# Patient Record
Sex: FEMALE | Race: WHITE | HISPANIC OR LATINO | Employment: FULL TIME | URBAN - METROPOLITAN AREA
[De-identification: names, ages, dates, MRNs, and addresses within clinical notes are randomized per-mention and may not be internally consistent; named-entity substitution may affect disease eponyms.]

---

## 2017-07-05 ENCOUNTER — OFFICE VISIT (OUTPATIENT)
Dept: OBSTETRICS AND GYNECOLOGY | Facility: CLINIC | Age: 37
End: 2017-07-05

## 2017-07-05 VITALS
HEIGHT: 66 IN | BODY MASS INDEX: 22.1 KG/M2 | SYSTOLIC BLOOD PRESSURE: 116 MMHG | DIASTOLIC BLOOD PRESSURE: 78 MMHG | WEIGHT: 137.5 LBS

## 2017-07-05 DIAGNOSIS — R87.610 ASCUS WITH POSITIVE HIGH RISK HPV CERVICAL: ICD-10-CM

## 2017-07-05 DIAGNOSIS — Z13.9 SCREENING: Primary | ICD-10-CM

## 2017-07-05 DIAGNOSIS — R87.810 ASCUS WITH POSITIVE HIGH RISK HPV CERVICAL: ICD-10-CM

## 2017-07-05 LAB
B-HCG UR QL: NEGATIVE
INTERNAL NEGATIVE CONTROL: NEGATIVE
INTERNAL POSITIVE CONTROL: POSITIVE
Lab: NORMAL

## 2017-07-05 PROCEDURE — 57452 EXAM OF CERVIX W/SCOPE: CPT | Performed by: OBSTETRICS & GYNECOLOGY

## 2017-07-05 PROCEDURE — 81025 URINE PREGNANCY TEST: CPT | Performed by: OBSTETRICS & GYNECOLOGY

## 2017-07-05 RX ORDER — NORGESTIMATE AND ETHINYL ESTRADIOL 0.25-0.035
1 KIT ORAL DAILY
COMMUNITY
End: 2018-04-11

## 2017-07-05 RX ORDER — FLUCONAZOLE 200 MG/1
200 TABLET ORAL DAILY
Qty: 2 TABLET | Refills: 0 | Status: SHIPPED | OUTPATIENT
Start: 2017-07-05 | End: 2018-04-11

## 2017-07-05 NOTE — PROGRESS NOTES
"Colposcopy  Date/Time: 7/5/2017 9:00 AM  Performed by: ARIAN WHARTON  Authorized by: ARIAN WHARTON   Preparation: Patient was prepped and draped in the usual sterile fashion.  Local anesthesia used: no    Anesthesia:  Local anesthesia used: no  Sedation:  Patient sedated: no    Patient tolerance: Patient tolerated the procedure well with no immediate complications  Comments: Colposcopy Procedure Note    Indications: Most recent Pap smear showed: {Findings; lab pap smear results:43726::\"no abnormalities\"}.  Prior cervical/vaginal disease: {Exam; colposcopy summary:733}.  Prior cervical treatment: {Therapies; recommendations colposcopy:727}.    Procedure Details   The risks and benefits of the procedure and Verbal informed consent obtained.    Speculum placed in vagina and excellent visualization of cervix achieved, cervix swabbed x 3 with acetic acid solution and Lugol's solution     Findings:  Cervix: {Findings; colposcopy:728}; {Procedures; colposcopy:729}.  Vaginal inspection: {Findings; colposcopy:730}.  Vulvar colposcopy: {Exam; colposcopy vulvar:731}.    Specimens: ***    Complications: {complications:98665}.    Plan:  {Plan; colonoscopy:734}    Arian Wharton MD  7/5/2017  9:00 AM            "

## 2017-07-05 NOTE — PROGRESS NOTES
Colposcopy  Date/Time: 7/5/2017 9:49 AM  Performed by: ARIAN WHARTON  Authorized by: ARIAN WHARTON   Preparation: Patient was prepped and draped in the usual sterile fashion.  Local anesthesia used: no    Anesthesia:  Local anesthesia used: no  Sedation:  Patient sedated: no    Patient tolerance: Patient tolerated the procedure well with no immediate complications  Comments: Colposcopy Procedure Note    Indications: Most recent Pap smear showed: no abnormalities.  Prior cervical/vaginal disease: normal exam without visible pathology.  Prior cervical treatment: no treatment.    Procedure Details   The risks and benefits of the procedure and Verbal informed consent obtained.    Speculum placed in vagina and excellent visualization of cervix achieved, cervix swabbed x 3 with acetic acid solution and Lugol's solution     Findings:  Cervix: no visible lesions, no mosaicism, no punctation and no abnormal vasculature; cervix swabbed with Lugol's solution and acetic acid.   Vaginal inspection: vaginal colposcopy not performed.  Vulvar colposcopy: vulvar colposcopy not performed.    Specimens: none    Complications: none.    Plan:  IMP:  Negative, adequate colposcopy.    PLAN:  Rpt pap 1 yr.     Arian Wharton MD  7/5/2017  9:50 AM

## 2017-09-15 ENCOUNTER — HOSPITAL ENCOUNTER (EMERGENCY)
Facility: HOSPITAL | Age: 37
Discharge: HOME | End: 2017-09-15
Attending: EMERGENCY MEDICINE | Admitting: EMERGENCY MEDICINE

## 2017-09-15 VITALS
RESPIRATION RATE: 20 BRPM | HEART RATE: 67 BPM | WEIGHT: 141.8 LBS | DIASTOLIC BLOOD PRESSURE: 71 MMHG | SYSTOLIC BLOOD PRESSURE: 99 MMHG | HEIGHT: 66 IN | BODY MASS INDEX: 22.79 KG/M2 | OXYGEN SATURATION: 100 % | TEMPERATURE: 97.6 F

## 2017-09-15 DIAGNOSIS — R21 RASH: Primary | ICD-10-CM

## 2017-09-15 PROCEDURE — 25010000002 TRIAMCINOLONE PER 10 MG: Performed by: EMERGENCY MEDICINE

## 2017-09-15 PROCEDURE — 99283 EMERGENCY DEPT VISIT LOW MDM: CPT

## 2017-09-15 PROCEDURE — 96372 THER/PROPH/DIAG INJ SC/IM: CPT

## 2017-09-15 PROCEDURE — 99282 EMERGENCY DEPT VISIT SF MDM: CPT | Performed by: EMERGENCY MEDICINE

## 2017-09-15 PROCEDURE — 25010000003 HYDROXYZINE PER 25 MG: Performed by: EMERGENCY MEDICINE

## 2017-09-15 RX ORDER — PREDNISONE 10 MG/1
TABLET ORAL
Qty: 42 TABLET | Refills: 0 | Status: SHIPPED | OUTPATIENT
Start: 2017-09-15 | End: 2018-04-11

## 2017-09-15 RX ORDER — HYDROXYZINE PAMOATE 50 MG/1
50 CAPSULE ORAL 4 TIMES DAILY PRN
Qty: 30 CAPSULE | Refills: 0 | Status: SHIPPED | OUTPATIENT
Start: 2017-09-15 | End: 2018-04-11

## 2017-09-15 RX ORDER — TRIAMCINOLONE ACETONIDE 40 MG/ML
40 INJECTION, SUSPENSION INTRA-ARTICULAR; INTRAMUSCULAR ONCE
Status: COMPLETED | OUTPATIENT
Start: 2017-09-15 | End: 2017-09-15

## 2017-09-15 RX ORDER — DIPHENHYDRAMINE HCL 25 MG
50 CAPSULE ORAL EVERY 6 HOURS PRN
COMMUNITY
End: 2018-04-11

## 2017-09-15 RX ORDER — HYDROXYZINE HYDROCHLORIDE 25 MG/ML
50 INJECTION, SOLUTION INTRAMUSCULAR ONCE
Status: COMPLETED | OUTPATIENT
Start: 2017-09-15 | End: 2017-09-15

## 2017-09-15 RX ORDER — LORATADINE 10 MG/1
1 CAPSULE, LIQUID FILLED ORAL DAILY
COMMUNITY
End: 2018-04-11

## 2017-09-15 RX ORDER — ACETAMINOPHEN 325 MG/1
650 TABLET ORAL EVERY 6 HOURS PRN
COMMUNITY
End: 2018-04-11

## 2017-09-15 RX ADMIN — HYDROXYZINE HYDROCHLORIDE 50 MG: 25 INJECTION, SOLUTION INTRAMUSCULAR at 12:12

## 2017-09-15 RX ADMIN — TRIAMCINOLONE ACETONIDE 40 MG: 40 INJECTION, SUSPENSION INTRA-ARTICULAR; INTRAMUSCULAR at 12:10

## 2017-09-15 NOTE — DISCHARGE INSTRUCTIONS
Medications as directed.  Follow up with clinic or family allergy as discussed.  Return to ED for medical emergencies.

## 2017-09-15 NOTE — ED PROVIDER NOTES
Subjective   Patient is a 37 y.o. female presenting with rash.   History provided by:  Patient   used: Yes (patient's son is interpreting.  Patient speaks some English.)    Rash   Location:  Full body  Quality: itchiness and redness    Severity:  Moderate  Onset quality:  Gradual  Duration:  6 weeks  Progression:  Worsening  Chronicity:  New  Context comment:  Affect:.  Patient will develop a tiny erythematous lesions.  These are scattered all over her body.  There are significant itching present.  No other family members are affected.  Relieved by:  Nothing  Ineffective treatments:  Antihistamines and anti-itch cream  Associated symptoms: no abdominal pain, no diarrhea, no fatigue, no fever, no headaches, no hoarse voice, no induration, no joint pain, no myalgias, no nausea, no periorbital edema, no shortness of breath, no sore throat, no throat swelling, no tongue swelling, no URI, not vomiting and not wheezing    Associated symptoms comment:  Difficulty sleeping due to pleuritis.  Patient becomes tearful at times when the itching is severe    HPI Narrative:  Ms. Stapleton is a 38 yo HF who presents secondary to rash and itching.  Onset of symptoms approximately 6 weeks ago.  The symptoms have progressively worsened.  Patient has been taking Benadryl, Claritin and Tylenol without any improvement. Patient has not been seen by a doctor about this issue prior to today.      Review of Systems   Constitutional: Negative.  Negative for appetite change, diaphoresis, fatigue and fever.   HENT: Negative.  Negative for hoarse voice and sore throat.    Eyes: Negative.    Respiratory: Negative for cough, chest tightness, shortness of breath and wheezing.    Cardiovascular: Negative for chest pain, palpitations and leg swelling.   Gastrointestinal: Negative for abdominal pain, diarrhea, nausea and vomiting.   Genitourinary: Negative.  Negative for difficulty urinating, flank pain, frequency and hematuria.    Musculoskeletal: Negative.  Negative for arthralgias and myalgias.   Skin: Positive for rash. Negative for color change and pallor.   Neurological: Negative.  Negative for dizziness, seizures, syncope and headaches.   Psychiatric/Behavioral: Negative.  Negative for agitation, behavioral problems and hallucinations.       History reviewed. No pertinent past medical history.    No Known Allergies    Past Surgical History:   Procedure Laterality Date   •  SECTION         History reviewed. No pertinent family history.    Social History     Social History   • Marital status: Single     Spouse name: N/A   • Number of children: N/A   • Years of education: N/A     Social History Main Topics   • Smoking status: Never Smoker   • Smokeless tobacco: None   • Alcohol use No   • Drug use: No   • Sexual activity: Not Asked     Other Topics Concern   • None     Social History Narrative   • None             Objective   Physical Exam   Constitutional: She is oriented to person, place, and time. She appears well-developed and well-nourished. No distress.   37-year-old  female laying in bed.  She appears in a good overall health.  She is accompanied by sounds ×2.  One of her sons is driving.   HENT:   Head: Normocephalic and atraumatic.   Right Ear: External ear normal.   Left Ear: External ear normal.   Nose: Nose normal.   Mouth/Throat: Oropharynx is clear and moist.   Eyes: Conjunctivae and EOM are normal. Pupils are equal, round, and reactive to light.   Neck: Normal range of motion. Neck supple.   Cardiovascular: Normal rate, regular rhythm, normal heart sounds and intact distal pulses.  Exam reveals no gallop and no friction rub.    No murmur heard.  Pulmonary/Chest: Effort normal and breath sounds normal.   Abdominal: Soft. Bowel sounds are normal. She exhibits no distension. There is no tenderness.   Musculoskeletal: Normal range of motion.   Neurological: She is alert and oriented to person, place, and time.    Skin: Skin is warm and dry. Rash noted. She is not diaphoretic.   Patient has scattered tiny erythematous lesions.  They're very sparse.  They are not in webbing of hands or feet.  They're not in axilla or inguinal folds.  Some excoriations present   Psychiatric: She has a normal mood and affect. Her behavior is normal.   Nursing note and vitals reviewed.      Procedures         ED Course  ED Course   Comment By Time   09/15/17  12:04 PM  Will give the injection of steroids as well as Vistaril.  Prescriptions for same for home.  Giving allergy follow-up Los Robledo MD 09/15 1202                  MDM  Number of Diagnoses or Management Options  Rash: minor  Risk of Complications, Morbidity, and/or Mortality  Presenting problems: minimal  Diagnostic procedures: minimal  Management options: minimal    Patient Progress  Patient progress: stable      Final diagnoses:   Rash            Los Robledo MD  09/15/17 7879

## 2017-09-15 NOTE — ED NOTES
Pt reports some relief with medications.  States itching has eased and she feels more relaxed.     Mimi Gold RN  09/15/17 1996

## 2018-04-11 ENCOUNTER — OFFICE VISIT (OUTPATIENT)
Dept: OBSTETRICS AND GYNECOLOGY | Age: 38
End: 2018-04-11

## 2018-04-11 VITALS
HEIGHT: 66 IN | SYSTOLIC BLOOD PRESSURE: 110 MMHG | BODY MASS INDEX: 22.66 KG/M2 | WEIGHT: 141 LBS | DIASTOLIC BLOOD PRESSURE: 60 MMHG

## 2018-04-11 DIAGNOSIS — Z11.3 SCREENING FOR STD (SEXUALLY TRANSMITTED DISEASE): ICD-10-CM

## 2018-04-11 DIAGNOSIS — R87.610 ASCUS WITH POSITIVE HIGH RISK HPV CERVICAL: ICD-10-CM

## 2018-04-11 DIAGNOSIS — Z12.4 PAP SMEAR FOR CERVICAL CANCER SCREENING: ICD-10-CM

## 2018-04-11 DIAGNOSIS — Z01.419 WELL FEMALE EXAM WITH ROUTINE GYNECOLOGICAL EXAM: Primary | ICD-10-CM

## 2018-04-11 DIAGNOSIS — R87.810 ASCUS WITH POSITIVE HIGH RISK HPV CERVICAL: ICD-10-CM

## 2018-04-11 PROCEDURE — 99395 PREV VISIT EST AGE 18-39: CPT | Performed by: OBSTETRICS & GYNECOLOGY

## 2018-04-11 NOTE — PROGRESS NOTES
Routine Annual Visit    2018    Patient: Marilee Stapleton          MR#:[id number]      History of Present Illness    Chief Complaint   Patient presents with   • Establish Care     NEW PATIENT (Last pap 17, ASCUS/hpv positive-[facility]; colpo 17, benign-Dr. Wharton)   • Gynecologic Exam     Annual.       38 y.o. female  who presents for annual exam.    Patient presents without complaints requesting her normal Pap check today.  She is considering pregnancy and wants to check and see if everything is okay with her getting pregnant.    Patient's last menstrual period was 2018 (exact date).  Obstetric History:  OB History      Para Term  AB Living    2 2 2 0 0 2    SAB TAB Ectopic Molar Multiple Live Births    0 0 0  0 2         Menstrual History:     Patient's last menstrual period was 2018 (exact date).       Sexual History:       ________________________________________  Patient Active Problem List   Diagnosis   • ASCUS with positive high risk HPV cervical   • Well female exam with routine gynecological exam       History reviewed. No pertinent past medical history.    Past Surgical History:   Procedure Laterality Date   •  SECTION     •  SECTION         History   Smoking Status   • Never Smoker   Smokeless Tobacco   • Never Used       Family History   Problem Relation Age of Onset   • Breast cancer Neg Hx    • Ovarian cancer Neg Hx    • Uterine cancer Neg Hx    • Colon cancer Neg Hx    • Melanoma Neg Hx    • Prostate cancer Neg Hx        Prior to Admission medications    Medication Sig Start Date End Date Taking? Authorizing Provider   acetaminophen (TYLENOL) 325 MG tablet Take 650 mg by mouth Every 6 (Six) Hours As Needed for Mild Pain .  18  Historical Provider, MD   diphenhydrAMINE (BENADRYL) 25 mg capsule Take 50 mg by mouth Every 6 (Six) Hours As Needed for Itching.  18  Historical Provider, MD   fluconazole (DIFLUCAN) 200 MG tablet Take 1  "tablet by mouth Daily. Take one tablet, rpt 3 days. 7/5/17 4/11/18  Arian Wharton MD   hydrOXYzine (VISTARIL) 50 MG capsule Take 1 capsule by mouth 4 (Four) Times a Day As Needed for Itching or Allergies for up to 30 doses. 9/15/17 4/11/18  Los Robledo MD   Loratadine (CLARITIN) 10 MG capsule Take 1 tablet by mouth Daily.  4/11/18  Historical Provider, MD   norgestimate-ethinyl estradiol (ORTHO-CYCLEN) 0.25-35 MG-MCG per tablet Take 1 tablet by mouth Daily.  4/11/18  Historical Provider, MD   predniSONE (DELTASONE) 10 MG tablet 6 tabs PO x 2 days, 5 tabs PO x 2 days, continue tapering one tablet every 2 days: Coarse 12 days. 9/15/17 4/11/18  Los Robledo MD     ________________________________________    Current contraception: none  History of abnormal Pap smear: no  Family history of uterine or ovarian cancer: no  Family History of colon cancer/colon polyps: no  History of abnormal mammogram: no  History of abnormal lipids: no    The following portions of the patient's history were reviewed and updated as appropriate: allergies, current medications, past family history, past medical history, past social history, past surgical history and problem list.    Review of Systems    Pertinent items are noted in HPI.     Objective   Physical Exam    /60   Ht 167.6 cm (66\")   Wt 64 kg (141 lb)   LMP 03/31/2018 (Exact Date)   Breastfeeding? No   BMI 22.76 kg/m²    BP Readings from Last 3 Encounters:   04/11/18 110/60   09/15/17 99/71   07/05/17 116/78      Wt Readings from Last 3 Encounters:   04/11/18 64 kg (141 lb)   09/15/17 64.3 kg (141 lb 12.8 oz)   07/05/17 62.4 kg (137 lb 8 oz)        BMI: Estimated body mass index is 22.76 kg/m² as calculated from the following:    Height as of this encounter: 167.6 cm (66\").    Weight as of this encounter: 64 kg (141 lb).    General:   alert, appears stated age and cooperative   Heart: regular rate and rhythm, S1, S2 normal, no murmur, click, rub or " gallop   Lungs: clear to auscultation bilaterally   Abdomen: soft, non-tender, without masses or organomegaly   Breast: inspection negative, no nipple discharge or bleeding, no masses or nodularity palpable   Vulva: normal   Vagina: normal mucosa   Cervix: no lesions   Uterus: normal size   Adnexa: normal adnexa     As part of wellness and prevention, the following topics were discussed with the patient:     []  Nutrition  []  Physical activity/regular exercise   []  Healthy weight  []  Injury prevention  []  Smoking cessation  []  Substance misuse/abuse  []  Sexual behavior  []  STD prevention  []  Contaception  []  Dental health  []  Mental health  []  Immunization  [x]  Encouraged SBE       Assessment:    Marilee was seen today for establish care and gynecologic exam.    Diagnoses and all orders for this visit:    Well female exam with routine gynecological exam    Pap smear for cervical cancer screening  -     IgP, Aptima HPV - ThinPrep Vial, Cervix    ASCUS with positive high risk HPV cervical  -     IgP, Aptima HPV - ThinPrep Vial, Cervix    Screening for STD (sexually transmitted disease)  -     Chlamydia trachomatis, Neisseria gonorrhoeae, Trichomonas vaginalis, PCR - Swab, Cervix          Plan:  No Follow-up on file.      Trevin Noel MD  4/11/2018 9:59 AM

## 2018-04-14 LAB
C TRACH RRNA SPEC QL NAA+PROBE: NEGATIVE
N GONORRHOEA RRNA SPEC QL NAA+PROBE: NEGATIVE
T VAGINALIS RRNA SPEC QL NAA+PROBE: NEGATIVE

## 2018-04-16 LAB
CYTOLOGIST CVX/VAG CYTO: ABNORMAL
CYTOLOGY CVX/VAG DOC THIN PREP: ABNORMAL
DX ICD CODE: ABNORMAL
DX ICD CODE: ABNORMAL
HIV 1 & 2 AB SER-IMP: ABNORMAL
HPV I/H RISK 4 DNA CVX QL PROBE+SIG AMP: POSITIVE
OTHER STN SPEC: ABNORMAL
PATH REPORT.FINAL DX SPEC: ABNORMAL
PATHOLOGIST CVX/VAG CYTO: ABNORMAL
RECOM F/U CVX/VAG CYTO: ABNORMAL
STAT OF ADQ CVX/VAG CYTO-IMP: ABNORMAL

## 2018-04-17 ENCOUNTER — TELEPHONE (OUTPATIENT)
Dept: OBSTETRICS AND GYNECOLOGY | Age: 38
End: 2018-04-17

## 2018-04-17 NOTE — TELEPHONE ENCOUNTER
----- Message from Trevin Noel MD sent at 4/16/2018  5:14 PM EDT -----  Call pt:   PAP is abnormal:  Atypical cells with +HPV  Needs colpo in 4-6 months, please schedule

## 2018-04-19 ENCOUNTER — TELEPHONE (OUTPATIENT)
Dept: OBSTETRICS AND GYNECOLOGY | Age: 38
End: 2018-04-19

## 2018-04-24 ENCOUNTER — TELEPHONE (OUTPATIENT)
Dept: OBSTETRICS AND GYNECOLOGY | Age: 38
End: 2018-04-24

## 2020-04-12 ENCOUNTER — HOSPITAL ENCOUNTER (EMERGENCY)
Facility: HOSPITAL | Age: 40
Discharge: HOME | End: 2020-04-12
Attending: EMERGENCY MEDICINE

## 2020-04-12 ENCOUNTER — NURSE TRIAGE (OUTPATIENT)
Dept: CALL CENTER | Facility: HOSPITAL | Age: 40
End: 2020-04-12

## 2020-04-12 VITALS
OXYGEN SATURATION: 99 % | SYSTOLIC BLOOD PRESSURE: 109 MMHG | RESPIRATION RATE: 16 BRPM | DIASTOLIC BLOOD PRESSURE: 72 MMHG | HEART RATE: 92 BPM | TEMPERATURE: 99 F

## 2020-04-12 DIAGNOSIS — N64.4 PAIN OF BOTH BREASTS: Primary | ICD-10-CM

## 2020-04-12 LAB — B-HCG UR QL: NEGATIVE

## 2020-04-12 PROCEDURE — 81025 URINE PREGNANCY TEST: CPT | Performed by: EMERGENCY MEDICINE

## 2020-04-12 PROCEDURE — 99282 EMERGENCY DEPT VISIT SF MDM: CPT | Performed by: EMERGENCY MEDICINE

## 2020-04-12 PROCEDURE — 99283 EMERGENCY DEPT VISIT LOW MDM: CPT

## 2020-04-12 RX ORDER — NABUMETONE 500 MG/1
500 TABLET, FILM COATED ORAL 2 TIMES DAILY PRN
Qty: 20 TABLET | Refills: 0 | Status: SHIPPED | OUTPATIENT
Start: 2020-04-12 | End: 2020-04-22

## 2020-04-12 NOTE — DISCHARGE INSTRUCTIONS
Medication as directed.  Follow-up with [facility] or OB/GYN as above.  Return to ED for medical emergencies.

## 2020-04-12 NOTE — TELEPHONE ENCOUNTER
"Caller is calling for person not with her, who is having severe breast pain.  Advised yes she can come to the ED.      Reason for Disposition  • General information question, no triage required and triager able to answer question    Additional Information  • Negative: [1] Caller is not with the adult (patient) AND [2] reporting urgent symptoms  • Negative: Lab result questions  • Negative: Medication questions  • Negative: Caller can't be reached by phone  • Negative: Caller has already spoken to PCP or another triager  • Negative: RN needs further essential information from caller in order to complete triage  • Negative: Requesting regular office appointment  • Negative: [1] Caller requesting NON-URGENT health information AND [2] PCP's office is the best resource  • Negative: Health Information question, no triage required and triager able to answer question    Answer Assessment - Initial Assessment Questions  1. REASON FOR CALL or QUESTION: \"What is your reason for calling today?\" or \"How can I best help you?\" or \"What question do you have that I can help answer?\"      Can a person still come to the ER?  Explained yes, the ER is still open for the same problems it has always been open for.    Protocols used: INFORMATION ONLY CALL-ADULT-      "

## 2020-04-12 NOTE — ED PROVIDER NOTES
Subjective   Marilee Chu is a 40-year-old  female who presents secondary to bilateral breast pain.  Onset of symptoms 6 to 7 weeks ago.  Her symptoms have progressively worsened.  The pain is constant and unrelieved by Tylenol or ibuprofen.  While she has pain in both breast of the right is more painful.  No fever or chills.  No discoloration of the breast.  No discharge or drainage.  Patient goes to a clinic in [place] however due to the Covid pandemic the clinic is not seeing patients currently.  Thus patient came to the ER for evaluation.      History provided by:  Patient   used: Yes (Translation provided by [employer])        Review of Systems   Constitutional: Negative.  Negative for fever.   HENT: Negative.  Negative for rhinorrhea.    Eyes: Negative.  Negative for redness.   Respiratory: Negative for cough.    Cardiovascular: Negative for chest pain.   Gastrointestinal: Negative for abdominal pain.   Endocrine: Negative.    Genitourinary: Negative.  Negative for difficulty urinating.   Musculoskeletal: Negative.  Negative for back pain.   Skin: Negative.  Negative for color change.   Neurological: Negative.  Negative for syncope.   Hematological: Negative.    Psychiatric/Behavioral: Negative.    All other systems reviewed and are negative.      History reviewed. No pertinent past medical history.    No Known Allergies    Past Surgical History:   Procedure Laterality Date   •  SECTION     •  SECTION         Family History   Problem Relation Age of Onset   • Breast cancer Neg Hx    • Ovarian cancer Neg Hx    • Uterine cancer Neg Hx    • Colon cancer Neg Hx    • Melanoma Neg Hx    • Prostate cancer Neg Hx        Social History     Socioeconomic History   • Marital status: Single     Spouse name: Not on file   • Number of children: 2   • Years of education: Not on file   • Highest education level: Not on file   Occupational History   • Occupation:  unemployed   Tobacco Use   • Smoking status: Never Smoker   • Smokeless tobacco: Never Used   Substance and Sexual Activity   • Alcohol use: No   • Drug use: No   • Sexual activity: Yes     Partners: Male     Birth control/protection: None           Objective   Physical Exam   Constitutional: She appears well-developed and well-nourished. No distress.   40-year-old  female sitting on side of the bed.  Patient appears in excellent overall health.  Vital signs unremarkable.  Patient is friendly and cooperative.  History and physical obtained via [employer].   HENT:   Head: Normocephalic and atraumatic.   Pulmonary/Chest: She exhibits no mass, no tenderness, no edema, no deformity and no swelling. Right breast exhibits no inverted nipple, no mass, no nipple discharge, no skin change and no tenderness. Left breast exhibits no inverted nipple, no mass, no nipple discharge, no skin change and no tenderness. No breast swelling, discharge or bleeding. Breasts are symmetrical.       Skin: She is not diaphoretic.   Nursing note and vitals reviewed.      Procedures           ED Course  ED Course as of Apr 12 1424   Sun Apr 12, 2020   1355 Bilateral breast exam is unremarkable.  Checking a urine pregnancy.  No family history of breast CA or fibrocystic breast disease.    [SS]   1419 Pregnancy test is negative.  I suspect patient's breast pain is hormone related or possibly early fibrocystic breast disease.  Giving OB/GYN for follow-up.  Prescribing NSAIDs for pain.Prescription   1-nabumetone    [SS]      ED Course User Index  [SS] Los Robledo MD      Labs Reviewed   PREGNANCY, URINE - Normal                                          MDM    Final diagnoses:   Pain of both breasts            Los Robledo MD  04/12/20 [id number]

## 2020-04-12 NOTE — ED NOTES
Dr. Robledo examed pt with  via phone and Julianna RN with pt during exam     Reina Marshall, LINDA  04/12/20 [id number]

## 2020-06-25 ENCOUNTER — OFFICE VISIT (OUTPATIENT)
Dept: OBSTETRICS AND GYNECOLOGY | Facility: CLINIC | Age: 40
End: 2020-06-25

## 2020-06-25 VITALS
HEIGHT: 65 IN | BODY MASS INDEX: 26.16 KG/M2 | SYSTOLIC BLOOD PRESSURE: 110 MMHG | WEIGHT: 157 LBS | DIASTOLIC BLOOD PRESSURE: 70 MMHG

## 2020-06-25 DIAGNOSIS — N64.4 BREAST PAIN: Primary | ICD-10-CM

## 2020-06-25 DIAGNOSIS — Z13.9 SCREENING FOR CONDITION: ICD-10-CM

## 2020-06-25 DIAGNOSIS — R87.610 ASCUS WITH POSITIVE HIGH RISK HPV CERVICAL: ICD-10-CM

## 2020-06-25 DIAGNOSIS — R87.810 ASCUS WITH POSITIVE HIGH RISK HPV CERVICAL: ICD-10-CM

## 2020-06-25 LAB
B-HCG UR QL: NEGATIVE
BILIRUB BLD-MCNC: NEGATIVE MG/DL
CLARITY, POC: ABNORMAL
COLOR UR: ABNORMAL
GLUCOSE UR STRIP-MCNC: NEGATIVE MG/DL
INTERNAL NEGATIVE CONTROL: NEGATIVE
INTERNAL POSITIVE CONTROL: POSITIVE
KETONES UR QL: NEGATIVE
LEUKOCYTE EST, POC: NEGATIVE
Lab: NORMAL
NITRITE UR-MCNC: NEGATIVE MG/ML
PH UR: 6 [PH] (ref 5–8)
PROT UR STRIP-MCNC: NEGATIVE MG/DL
RBC # UR STRIP: ABNORMAL /UL
SP GR UR: 1.02 (ref 1–1.03)
UROBILINOGEN UR QL: NORMAL

## 2020-06-25 PROCEDURE — 81025 URINE PREGNANCY TEST: CPT | Performed by: OBSTETRICS & GYNECOLOGY

## 2020-06-25 PROCEDURE — 99203 OFFICE O/P NEW LOW 30 MIN: CPT | Performed by: OBSTETRICS & GYNECOLOGY

## 2020-06-25 PROCEDURE — 81002 URINALYSIS NONAUTO W/O SCOPE: CPT | Performed by: OBSTETRICS & GYNECOLOGY

## 2020-06-25 NOTE — PROGRESS NOTES
"New GYN Exam    CC- Here for B breast pain    Marilee Stapleton is a 40 y.o. female new patient who presents for bilateral  breast pain.She is Georgian speaking only and the visit was conducted with the help of the  phone. She says she started having sharp breast pain in February of this year. She says for 3 weeks out of every month she has severe cramping and shooting pain in both breasts. This is not related to her menstrual cycle and her week of \"relief\" may occur at any time during her cycle. She says the pain is worse on the right side and feels like milk letdown, but she has never had any skin changes, rashes or nipple discharge. She relates the sensation in her breasts to \"inflammation\" because they feel hard and warm when she has pain. She has never had any fevers, chills or associated systemic symptoms. She has tried Tylenol and Motrin without relief. She went to the ER in 2020 and had a normal evaluation there and was given Vit E supplements to try but they made her feel sick so she did not take them for more than a week or so. She is very distressed by this pain and says she \" can tell when it will be a bad day because I will wake up with a sour taste in my mouth and on those days my breast pain will be severe\". The last time she was seen by gyn in 2018 she had an ASCUS + HPV pap and never showed up for colpo or repeat pap. I offered to do her annual today but she declines as she is on her cycle. Her menstrual cycles are regular. She is not on any contraception but is not planning any pregnancies.      OB History        2    Para   2    Term   2       0    AB   0    Living   2       SAB   0    TAB   0    Ectopic   0    Molar        Multiple   0    Live Births   2                Menarche: 15  Current contraception: none  History of abnormal Pap smear: yes -  2018- ASCUS + HPV- no f/u  History of abnormal mammogram: no  Family history of uterine, colon or ovarian cancer: no  Family " history of breast cancer: no  H/o STDs: none  Last pap:2018  Gardasil:missed  JIGNA: none    Health Maintenance   Topic Date Due   • ANNUAL PHYSICAL  1984   • TDAP/TD VACCINES (1 - Tdap) 1992   • HEPATITIS C SCREENING  2017   • Annual Gynecologic Pelvic and Breast Exam  2019   • INFLUENZA VACCINE  2020   • PAP SMEAR  2021       Past Medical History:   Diagnosis Date   • Abnormal Pap smear of cervix 2018    ASCUS + HPV =- no follow up       Past Surgical History:   Procedure Laterality Date   •  SECTION      placenta previa   •  SECTION           Current Outpatient Medications:   •  vitamin E 100 UNIT capsule, Take 100 Units by mouth Daily., Disp: , Rfl:     No Known Allergies    Social History     Tobacco Use   • Smoking status: Never Smoker   • Smokeless tobacco: Never Used   Substance Use Topics   • Alcohol use: No   • Drug use: No       Family History   Problem Relation Age of Onset   • Breast cancer Neg Hx    • Ovarian cancer Neg Hx    • Uterine cancer Neg Hx    • Colon cancer Neg Hx    • Prostate cancer Neg Hx    • Deep vein thrombosis Neg Hx        Review of Systems   Constitutional: Positive for activity change (home in pandemic). Negative for appetite change, fatigue, fever and unexpected weight change.   HENT:        + sour taste in mouth   Eyes: Negative for photophobia and visual disturbance.   Respiratory: Negative for cough and shortness of breath.    Cardiovascular: Negative for chest pain and palpitations.   Gastrointestinal: Negative for abdominal distention, abdominal pain, constipation, diarrhea and nausea.   Endocrine: Negative for cold intolerance and heat intolerance.   Genitourinary: Positive for vaginal bleeding (on cycle). Negative for dyspareunia, dysuria, menstrual problem, pelvic pain and vaginal discharge.   Musculoskeletal: Negative for back pain.        + pain in both breasts R>L   Skin: Negative for color change and rash.  "  Allergic/Immunologic: Negative for environmental allergies and food allergies.   Neurological: Negative for headaches.   Hematological: Negative for adenopathy. Does not bruise/bleed easily.   Psychiatric/Behavioral: Negative for dysphoric mood. The patient is not nervous/anxious.    All other systems reviewed and are negative.      /70   Ht 165.1 cm (65\")   Wt 71.2 kg (157 lb)   LMP 06/24/2020   BMI 26.13 kg/m²     Physical Exam   Constitutional: She is oriented to person, place, and time. She appears well-developed and well-nourished.   HENT:   Head: Normocephalic and atraumatic.   Eyes: Conjunctivae are normal. No scleral icterus.   Neck: Neck supple. No thyromegaly present.   Cardiovascular: Normal rate and regular rhythm.   Pulmonary/Chest: Effort normal and breath sounds normal. Right breast exhibits no inverted nipple, no mass, no nipple discharge, no skin change and no tenderness. Left breast exhibits no inverted nipple, no mass, no nipple discharge, no skin change and no tenderness.   Breasts are symmetrical, large size  No palpable masses, no skin changes, no LAD     Abdominal: Soft. She exhibits no distension and no mass. There is no tenderness. There is no rebound and no guarding. No hernia.   Neurological: She is alert and oriented to person, place, and time.   Skin: Skin is warm and dry.   Psychiatric: She has a normal mood and affect. Her behavior is normal. Judgment and thought content normal.   Nursing note and vitals reviewed.            Assessment/Plan  1) B mastodynia- no obvious etiology of pain. Exam is unremarkable and pain is not reproducible on exam. Schedule B dx MMG. Pt denies any galactorrhea. Check CBC, CMP, TSH, BRITTNY and CRP. Schedule B dx MMG. Enc pt to try Vit E again with food. May need referral to breast specialist if this workup is not revealing.  2) RHM- pt had an abnormal pap 2 years ago with no followup. Declines annual today. RTO 2-3 weeks annual and recheck breasts. "          Marilee was seen today for breast problem.    Diagnoses and all orders for this visit:    Breast pain  -     TSH Rfx On Abnormal To Free T4  -     CBC & Differential  -     Comprehensive Metabolic Panel  -     Prolactin  -     C-reactive Protein  -     BRITTNY  -     Mammo Diagnostic Bilateral With CAD; Future    Screening for condition  -     POC Urinalysis Dipstick  -     POC Pregnancy, Urine    ASCUS with positive high risk HPV cervical    Other orders  -     BRITTNY  -     C-reactive Protein          Sarah Berger MD  06/25/2020  21:33

## 2020-06-26 PROBLEM — Z01.419 WELL FEMALE EXAM WITH ROUTINE GYNECOLOGICAL EXAM: Status: RESOLVED | Noted: 2018-04-11 | Resolved: 2020-06-26

## 2020-06-26 PROBLEM — N64.4 BREAST PAIN: Status: ACTIVE | Noted: 2020-06-26

## 2020-06-26 LAB
ALBUMIN SERPL-MCNC: 4.9 G/DL (ref 3.5–5.2)
ALBUMIN/GLOB SERPL: 1.6 G/DL
ALP SERPL-CCNC: 94 U/L (ref 39–117)
ALT SERPL-CCNC: 18 U/L (ref 1–33)
ANA SER QL: NEGATIVE
AST SERPL-CCNC: 17 U/L (ref 1–32)
BASOPHILS # BLD AUTO: 0.03 10*3/MM3 (ref 0–0.2)
BASOPHILS NFR BLD AUTO: 0.6 % (ref 0–1.5)
BILIRUB SERPL-MCNC: 0.3 MG/DL (ref 0.2–1.2)
BUN SERPL-MCNC: 10 MG/DL (ref 6–20)
BUN/CREAT SERPL: 14.7 (ref 7–25)
CALCIUM SERPL-MCNC: 9.7 MG/DL (ref 8.6–10.5)
CHLORIDE SERPL-SCNC: 104 MMOL/L (ref 98–107)
CO2 SERPL-SCNC: 27.8 MMOL/L (ref 22–29)
CREAT SERPL-MCNC: 0.68 MG/DL (ref 0.57–1)
CRP SERPL-MCNC: 0.17 MG/DL (ref 0–0.5)
EOSINOPHIL # BLD AUTO: 0.02 10*3/MM3 (ref 0–0.4)
EOSINOPHIL NFR BLD AUTO: 0.4 % (ref 0.3–6.2)
ERYTHROCYTE [DISTWIDTH] IN BLOOD BY AUTOMATED COUNT: 12.4 % (ref 12.3–15.4)
GLOBULIN SER CALC-MCNC: 3.1 GM/DL
GLUCOSE SERPL-MCNC: 126 MG/DL (ref 65–99)
HCT VFR BLD AUTO: 42.2 % (ref 34–46.6)
HGB BLD-MCNC: 13.8 G/DL (ref 12–15.9)
IMM GRANULOCYTES # BLD AUTO: 0.02 10*3/MM3 (ref 0–0.05)
IMM GRANULOCYTES NFR BLD AUTO: 0.4 % (ref 0–0.5)
LYMPHOCYTES # BLD AUTO: 1.27 10*3/MM3 (ref 0.7–3.1)
LYMPHOCYTES NFR BLD AUTO: 23.3 % (ref 19.6–45.3)
MCH RBC QN AUTO: 30.8 PG (ref 26.6–33)
MCHC RBC AUTO-ENTMCNC: 32.7 G/DL (ref 31.5–35.7)
MCV RBC AUTO: 94.2 FL (ref 79–97)
MONOCYTES # BLD AUTO: 0.48 10*3/MM3 (ref 0.1–0.9)
MONOCYTES NFR BLD AUTO: 8.8 % (ref 5–12)
NEUTROPHILS # BLD AUTO: 3.63 10*3/MM3 (ref 1.7–7)
NEUTROPHILS NFR BLD AUTO: 66.5 % (ref 42.7–76)
NRBC BLD AUTO-RTO: 0 /100 WBC (ref 0–0.2)
PLATELET # BLD AUTO: 322 10*3/MM3 (ref 140–450)
POTASSIUM SERPL-SCNC: 4.1 MMOL/L (ref 3.5–5.2)
PROLACTIN SERPL-MCNC: 15.8 NG/ML (ref 4.8–23.3)
PROT SERPL-MCNC: 8 G/DL (ref 6–8.5)
RBC # BLD AUTO: 4.48 10*6/MM3 (ref 3.77–5.28)
SODIUM SERPL-SCNC: 140 MMOL/L (ref 136–145)
TSH SERPL DL<=0.005 MIU/L-ACNC: 2.27 UIU/ML (ref 0.27–4.2)
WBC # BLD AUTO: 5.45 10*3/MM3 (ref 3.4–10.8)

## 2020-07-09 ENCOUNTER — HOSPITAL ENCOUNTER (OUTPATIENT)
Dept: MAMMOGRAPHY | Facility: HOSPITAL | Age: 40
Discharge: HOME | End: 2020-07-09
Admitting: OBSTETRICS & GYNECOLOGY

## 2020-07-09 ENCOUNTER — HOSPITAL ENCOUNTER (OUTPATIENT)
Dept: ULTRASOUND IMAGING | Facility: HOSPITAL | Age: 40
Discharge: HOME | End: 2020-07-09

## 2020-07-09 DIAGNOSIS — R52 PAIN: ICD-10-CM

## 2020-07-09 DIAGNOSIS — N64.4 BREAST PAIN: ICD-10-CM

## 2020-07-09 PROCEDURE — G0279 TOMOSYNTHESIS, MAMMO: HCPCS

## 2020-07-09 PROCEDURE — 77066 DX MAMMO INCL CAD BI: CPT

## 2020-07-09 PROCEDURE — 76641 ULTRASOUND BREAST COMPLETE: CPT

## 2020-07-12 NOTE — PROGRESS NOTES
PIP= all breast imaging is normal. She needs to keep her f/u appt for annual and we will recheck her breasts at that time.

## 2020-07-16 ENCOUNTER — OFFICE VISIT (OUTPATIENT)
Dept: OBSTETRICS AND GYNECOLOGY | Facility: CLINIC | Age: 40
End: 2020-07-16

## 2020-07-16 VITALS
BODY MASS INDEX: 26.49 KG/M2 | DIASTOLIC BLOOD PRESSURE: 70 MMHG | HEIGHT: 65 IN | WEIGHT: 159 LBS | SYSTOLIC BLOOD PRESSURE: 110 MMHG

## 2020-07-16 DIAGNOSIS — Z11.3 SCREEN FOR STD (SEXUALLY TRANSMITTED DISEASE): ICD-10-CM

## 2020-07-16 DIAGNOSIS — Z13.9 SCREENING FOR CONDITION: ICD-10-CM

## 2020-07-16 DIAGNOSIS — Z01.419 ENCOUNTER FOR GYNECOLOGICAL EXAMINATION WITHOUT ABNORMAL FINDING: ICD-10-CM

## 2020-07-16 DIAGNOSIS — Z01.419 PAP SMEAR, LOW-RISK: Primary | ICD-10-CM

## 2020-07-16 DIAGNOSIS — N64.4 BREAST PAIN: ICD-10-CM

## 2020-07-16 DIAGNOSIS — Z11.51 SPECIAL SCREENING EXAMINATION FOR HUMAN PAPILLOMAVIRUS (HPV): ICD-10-CM

## 2020-07-16 LAB
B-HCG UR QL: NEGATIVE
BILIRUB BLD-MCNC: NEGATIVE MG/DL
CLARITY, POC: CLEAR
COLOR UR: YELLOW
GLUCOSE UR STRIP-MCNC: NEGATIVE MG/DL
INTERNAL NEGATIVE CONTROL: NEGATIVE
INTERNAL POSITIVE CONTROL: POSITIVE
KETONES UR QL: NEGATIVE
LEUKOCYTE EST, POC: NEGATIVE
Lab: NORMAL
NITRITE UR-MCNC: NEGATIVE MG/ML
PH UR: 6 [PH] (ref 5–8)
PROT UR STRIP-MCNC: NEGATIVE MG/DL
RBC # UR STRIP: NEGATIVE /UL
SP GR UR: 1.02 (ref 1–1.03)
UROBILINOGEN UR QL: NORMAL

## 2020-07-16 PROCEDURE — 81025 URINE PREGNANCY TEST: CPT | Performed by: OBSTETRICS & GYNECOLOGY

## 2020-07-16 PROCEDURE — 81002 URINALYSIS NONAUTO W/O SCOPE: CPT | Performed by: OBSTETRICS & GYNECOLOGY

## 2020-07-16 PROCEDURE — 99395 PREV VISIT EST AGE 18-39: CPT | Performed by: OBSTETRICS & GYNECOLOGY

## 2020-07-16 NOTE — PROGRESS NOTES
"New GYN Exam    CC- Here for B breast pain    Marilee Stapleton is a 40 y.o. female est pt  who presents for annual exam and followup for  bilateral  breast pain.She is Luxembourger speaking only and the visit was conducted with the help of the  phone. She says she started having sharp breast pain in February of this year.  She was seen here in  and had normal breast exam, normal labs and normal breast imaging.  She was started on vitamin D supplementation and says that her breast pain is about 50% better already.  They are using condoms currently.  She is considering another pregnancy but wants to make sure that \"everything is okay\" first.  She has regular cycles that last 7 days.  They are using condoms for contraception presently.    OB History        2    Para   2    Term   2       0    AB   0    Living   2       SAB   0    TAB   0    Ectopic   0    Molar        Multiple   0    Live Births   2          Obstetric Comments   Considering pregnancy             Menarche: 15  Current contraception: condoms  History of abnormal Pap smear: yes -  2018- ASCUS + HPV- no f/u  History of abnormal mammogram: no  Family history of uterine, colon or ovarian cancer: no  Family history of breast cancer: no  H/o STDs: none  Last pap:2018  Gardasil:missed  JIGNA: none    Health Maintenance   Topic Date Due   • ANNUAL PHYSICAL  1984   • TDAP/TD VACCINES (1 - Tdap) 1992   • HEPATITIS C SCREENING  2017   • Annual Gynecologic Pelvic and Breast Exam  2019   • INFLUENZA VACCINE  2020   • PAP SMEAR  2021       Past Medical History:   Diagnosis Date   • Abnormal Pap smear of cervix 2018    ASCUS + HPV =- no follow up       Past Surgical History:   Procedure Laterality Date   •  SECTION      placenta previa   •  SECTION           Current Outpatient Medications:   •  vitamin E 100 UNIT capsule, Take 100 Units by mouth Daily., Disp: , Rfl:     No Known " "Allergies    Social History     Tobacco Use   • Smoking status: Never Smoker   • Smokeless tobacco: Never Used   Substance Use Topics   • Alcohol use: No   • Drug use: No       Family History   Problem Relation Age of Onset   • Breast cancer Neg Hx    • Ovarian cancer Neg Hx    • Uterine cancer Neg Hx    • Colon cancer Neg Hx    • Prostate cancer Neg Hx    • Deep vein thrombosis Neg Hx        Review of Systems   Constitutional: Positive for activity change (home in pandemic). Negative for appetite change, fatigue, fever and unexpected weight change.   HENT:        + sour taste in mouth   Eyes: Negative for photophobia and visual disturbance.   Respiratory: Negative for cough and shortness of breath.    Cardiovascular: Negative for chest pain and palpitations.   Gastrointestinal: Negative for abdominal distention, abdominal pain, constipation, diarrhea and nausea.   Endocrine: Negative for cold intolerance and heat intolerance.   Genitourinary: Negative for dyspareunia, dysuria, menstrual problem, pelvic pain, vaginal bleeding and vaginal discharge.   Musculoskeletal: Negative for back pain.        + pain in both breasts R>L, improving   Skin: Negative for color change and rash.   Allergic/Immunologic: Negative for environmental allergies and food allergies.   Neurological: Negative for headaches.   Hematological: Negative for adenopathy. Does not bruise/bleed easily.   Psychiatric/Behavioral: Negative for dysphoric mood. The patient is not nervous/anxious.    All other systems reviewed and are negative.      /70   Ht 165.1 cm (65\")   Wt 72.1 kg (159 lb)   LMP 06/24/2020   BMI 26.46 kg/m²     Physical Exam   Constitutional: She is oriented to person, place, and time. She appears well-developed and well-nourished.   HENT:   Head: Normocephalic and atraumatic.   Eyes: Conjunctivae are normal. No scleral icterus.   Neck: Neck supple. No thyromegaly present.   Cardiovascular: Normal rate and regular rhythm. "   Pulmonary/Chest: Effort normal and breath sounds normal. Right breast exhibits no inverted nipple, no mass, no nipple discharge, no skin change and no tenderness. Left breast exhibits no inverted nipple, no mass, no nipple discharge, no skin change and no tenderness.   Breasts are symmetrical, large size  No palpable masses, no skin changes, no LAD       Abdominal: Soft. She exhibits no distension and no mass. There is no tenderness. There is no rebound and no guarding. No hernia.   Genitourinary: Uterus normal. Pelvic exam was performed with patient supine. There is no rash, tenderness, lesion or injury on the right labia. There is no rash, tenderness, lesion or injury on the left labia. Cervix exhibits no motion tenderness, no discharge and no friability. Right adnexum displays no mass, no tenderness and no fullness. Left adnexum displays no mass, no tenderness and no fullness. No erythema, tenderness or bleeding in the vagina. No foreign body in the vagina. No signs of injury around the vagina. No vaginal discharge found.   Neurological: She is alert and oriented to person, place, and time.   Skin: Skin is warm and dry.   Psychiatric: She has a normal mood and affect. Her behavior is normal. Judgment and thought content normal.   Nursing note and vitals reviewed.            Assessment/Plan  1) Annual exam- check pap/HPV/C/G/T  2) STD panel- accepts.  3)B mastodynia- no obvious etiology of pain. Exam is unremarkable and pain is not reproducible on exam. Normal  B dx MMG and US 7/2020 . Normal CBC, CMP, TSH, BRITTNY and CRP. Pain is improving on Vit E supplements.   4) CS- using condoms now. Considering pregnancy. Discussed with patient the age-related decline in fertility as well as the age-related increase in aneuploidy.Enc PNV. Declines carrier screening.   5) Discussed with patient daily folic acid intake to prevent birth defects such as spina bifida.  I also encouraged use of condoms, monthly self breast exam  and 30 minutes of daily exercise.  We discussed normal menstrual cycles and use of adequate contraception.    6) RTO 1 year or prn          Marilee was seen today for gynecologic exam.    Diagnoses and all orders for this visit:    Pap smear, low-risk  -     Cancel: Pap IG, HPV-hr  -     PapIG, CtNgTv, HPV, Rfx 16 / 18  -     Hepatitis B Surface Antigen  -     Hepatitis C Antibody  -     HIV-1 / O / 2 Ag / Antibody 4th Generation  -     HSV 1 & 2 - Specific Antibody, IgG  -     RPR, Rfx Qn RPR / Confirm TP    Screening for condition  -     POC Urinalysis Dipstick  -     POC Pregnancy, Urine    Special screening examination for human papillomavirus (HPV)  -     Cancel: Pap IG, HPV-hr  -     PapIG, CtNgTv, HPV, Rfx 16 / 18  -     Hepatitis B Surface Antigen  -     Hepatitis C Antibody  -     HIV-1 / O / 2 Ag / Antibody 4th Generation  -     HSV 1 & 2 - Specific Antibody, IgG  -     RPR, Rfx Qn RPR / Confirm TP          Sarah Berger MD  7/16/2020  12:21

## 2020-07-17 LAB
HBV SURFACE AG SERPL QL IA: NEGATIVE
HCV AB S/CO SERPL IA: <0.1 S/CO RATIO (ref 0–0.9)
HIV 1+2 AB+HIV1 P24 AG SERPL QL IA: NON REACTIVE
HSV1 IGG SER IA-ACNC: 13.7 INDEX (ref 0–0.9)
HSV2 IGG SER IA-ACNC: <0.91 INDEX (ref 0–0.9)
RPR SER QL: NON REACTIVE

## 2020-07-24 LAB
C TRACH RRNA CVX QL NAA+PROBE: NEGATIVE
CYTOLOGIST CVX/VAG CYTO: ABNORMAL
CYTOLOGY CVX/VAG DOC CYTO: ABNORMAL
CYTOLOGY CVX/VAG DOC THIN PREP: ABNORMAL
DX ICD CODE: ABNORMAL
DX ICD CODE: ABNORMAL
HIV 1 & 2 AB SER-IMP: ABNORMAL
HPV I/H RISK 1 DNA CVX QL PROBE+SIG AMP: POSITIVE
N GONORRHOEA RRNA CVX QL NAA+PROBE: NEGATIVE
OTHER STN SPEC: ABNORMAL
PATHOLOGIST CVX/VAG CYTO: ABNORMAL
RECOM F/U CVX/VAG CYTO: ABNORMAL
STAT OF ADQ CVX/VAG CYTO-IMP: ABNORMAL
T VAGINALIS RRNA SPEC QL NAA+PROBE: NEGATIVE

## 2020-07-26 NOTE — PROGRESS NOTES
PIP= pt has abnormal pap ( ASCUS + HPV) and needs colpo in the next 4 weeks. You will need a  to call her

## 2020-08-20 ENCOUNTER — OFFICE VISIT (OUTPATIENT)
Dept: OBSTETRICS AND GYNECOLOGY | Facility: CLINIC | Age: 40
End: 2020-08-20

## 2020-08-20 VITALS
SYSTOLIC BLOOD PRESSURE: 100 MMHG | BODY MASS INDEX: 26.29 KG/M2 | HEIGHT: 65 IN | DIASTOLIC BLOOD PRESSURE: 60 MMHG | WEIGHT: 157.8 LBS

## 2020-08-20 DIAGNOSIS — R87.619 ABNORMAL CERVICAL PAPANICOLAOU SMEAR, UNSPECIFIED ABNORMAL PAP FINDING: Primary | ICD-10-CM

## 2020-08-20 DIAGNOSIS — Z13.9 SCREENING FOR CONDITION: ICD-10-CM

## 2020-08-20 LAB
B-HCG UR QL: NEGATIVE
BILIRUB BLD-MCNC: NEGATIVE MG/DL
CLARITY, POC: CLEAR
COLOR UR: YELLOW
GLUCOSE UR STRIP-MCNC: NEGATIVE MG/DL
INTERNAL NEGATIVE CONTROL: NEGATIVE
INTERNAL POSITIVE CONTROL: POSITIVE
KETONES UR QL: NEGATIVE
LEUKOCYTE EST, POC: NEGATIVE
Lab: 55
NITRITE UR-MCNC: NEGATIVE MG/ML
PH UR: 5 [PH] (ref 5–8)
PROT UR STRIP-MCNC: NEGATIVE MG/DL
RBC # UR STRIP: NEGATIVE /UL
SP GR UR: 1.01 (ref 1–1.03)
UROBILINOGEN UR QL: NORMAL

## 2020-08-20 PROCEDURE — 81025 URINE PREGNANCY TEST: CPT | Performed by: OBSTETRICS & GYNECOLOGY

## 2020-08-20 PROCEDURE — 57454 BX/CURETT OF CERVIX W/SCOPE: CPT | Performed by: OBSTETRICS & GYNECOLOGY

## 2020-08-20 NOTE — PROGRESS NOTES
Colposcopy Procedure Note    Procedures          Indications: Most recent Pap smear showed: ASCUS with POSITIVE high risk HPV    Prior cervical/vaginal disease: unknown, had 4/2018- ASCUS + HPV, no followup  Prior cervical treatment: no treatment.  Contraception: condoms    Procedure Details   The risks and benefits of the procedure and Verbal informed consent obtained.  Pregnancy test: negative    Speculum placed in vagina and excellent visualization of cervix achieved, cervix swabbed x 3 with acetic acid solution and Lugol's solution     Findings:  Cervix: visible lesion(s) at 12, 7 o'clock; cervix swabbed with Lugol's solution, endocervical curettage performed, cervical biopsies taken at 12 & 7 o'clock, specimen labelled and sent to pathology and hemostasis achieved with Monsel's solution.  Vaginal inspection: normal without visible lesions.  Vulvar colposcopy: normal mucosa without lesions.  Colpo adequacy: was adquate    Specimens: bx 12, 7 and ECC    Colpo impression: RUMA 2-3    Complications: none.   Patient tolerated procedure well.     Smoking Status: No      Plan:  Specimens labelled and sent to Pathology.  Will base further treatment on Pathology findings.  Treatment options discussed with patient.  Post biopsy instructions given to patient.  Return to discuss Pathology results in 3 weeks.    Sarah Berger MD   8/20/2020  16:09

## 2020-08-24 LAB
DX ICD CODE: NORMAL
PATH REPORT.FINAL DX SPEC: NORMAL
PATH REPORT.GROSS SPEC: NORMAL
PATH REPORT.RELEVANT HX SPEC: NORMAL
PATH REPORT.SITE OF ORIGIN SPEC: NORMAL
PATHOLOGIST NAME: NORMAL
PAYMENT PROCEDURE: NORMAL

## 2020-08-24 NOTE — PROGRESS NOTES
PIP= pt has severe dysplasia on biopsy, she needs to come in to discuss a LEEP vs CKC ( she does not speak English)

## 2020-09-08 ENCOUNTER — HOSPITAL ENCOUNTER (EMERGENCY)
Facility: HOSPITAL | Age: 40
Discharge: HOME | End: 2020-09-08
Attending: EMERGENCY MEDICINE | Admitting: EMERGENCY MEDICINE

## 2020-09-08 ENCOUNTER — APPOINTMENT (OUTPATIENT)
Dept: GENERAL RADIOLOGY | Facility: HOSPITAL | Age: 40
End: 2020-09-08

## 2020-09-08 VITALS
WEIGHT: 158 LBS | OXYGEN SATURATION: 99 % | DIASTOLIC BLOOD PRESSURE: 71 MMHG | BODY MASS INDEX: 25.39 KG/M2 | SYSTOLIC BLOOD PRESSURE: 106 MMHG | HEART RATE: 86 BPM | RESPIRATION RATE: 16 BRPM | HEIGHT: 66 IN | TEMPERATURE: 98.4 F

## 2020-09-08 DIAGNOSIS — R07.89 MUSCULOSKELETAL CHEST PAIN: Primary | ICD-10-CM

## 2020-09-08 LAB
ALBUMIN SERPL-MCNC: 4.3 G/DL (ref 3.5–5.2)
ALBUMIN/GLOB SERPL: 1.4 G/DL
ALP SERPL-CCNC: 88 U/L (ref 39–117)
ALT SERPL W P-5'-P-CCNC: 16 U/L (ref 1–33)
ANION GAP SERPL CALCULATED.3IONS-SCNC: 9.2 MMOL/L (ref 5–15)
AST SERPL-CCNC: 27 U/L (ref 1–32)
BASOPHILS # BLD AUTO: 0.03 10*3/MM3 (ref 0–0.2)
BASOPHILS NFR BLD AUTO: 0.3 % (ref 0–1.5)
BILIRUB SERPL-MCNC: <0.2 MG/DL (ref 0–1.2)
BUN SERPL-MCNC: 13 MG/DL (ref 6–20)
BUN/CREAT SERPL: 16.9 (ref 7–25)
CALCIUM SPEC-SCNC: 8.9 MG/DL (ref 8.6–10.5)
CHLORIDE SERPL-SCNC: 104 MMOL/L (ref 98–107)
CO2 SERPL-SCNC: 26.8 MMOL/L (ref 22–29)
CREAT SERPL-MCNC: 0.77 MG/DL (ref 0.57–1)
D DIMER PPP FEU-MCNC: 0.34 MCGFEU/ML (ref 0–0.46)
DEPRECATED RDW RBC AUTO: 44 FL (ref 37–54)
EOSINOPHIL # BLD AUTO: 0.05 10*3/MM3 (ref 0–0.4)
EOSINOPHIL NFR BLD AUTO: 0.6 % (ref 0.3–6.2)
ERYTHROCYTE [DISTWIDTH] IN BLOOD BY AUTOMATED COUNT: 12.4 % (ref 12.3–15.4)
GFR SERPL CREATININE-BSD FRML MDRD: 83 ML/MIN/1.73
GLOBULIN UR ELPH-MCNC: 3 GM/DL
GLUCOSE SERPL-MCNC: 118 MG/DL (ref 65–99)
HCG SERPL QL: NEGATIVE
HCT VFR BLD AUTO: 40.8 % (ref 34–46.6)
HGB BLD-MCNC: 12.8 G/DL (ref 12–15.9)
IMM GRANULOCYTES # BLD AUTO: 0.04 10*3/MM3 (ref 0–0.05)
IMM GRANULOCYTES NFR BLD AUTO: 0.4 % (ref 0–0.5)
LYMPHOCYTES # BLD AUTO: 2.85 10*3/MM3 (ref 0.7–3.1)
LYMPHOCYTES NFR BLD AUTO: 31.7 % (ref 19.6–45.3)
MCH RBC QN AUTO: 30.3 PG (ref 26.6–33)
MCHC RBC AUTO-ENTMCNC: 31.4 G/DL (ref 31.5–35.7)
MCV RBC AUTO: 96.7 FL (ref 79–97)
MONOCYTES # BLD AUTO: 0.87 10*3/MM3 (ref 0.1–0.9)
MONOCYTES NFR BLD AUTO: 9.7 % (ref 5–12)
NEUTROPHILS NFR BLD AUTO: 5.14 10*3/MM3 (ref 1.7–7)
NEUTROPHILS NFR BLD AUTO: 57.3 % (ref 42.7–76)
NRBC BLD AUTO-RTO: 0 /100 WBC (ref 0–0.2)
PLATELET # BLD AUTO: 303 10*3/MM3 (ref 140–450)
PMV BLD AUTO: 10.3 FL (ref 6–12)
POTASSIUM SERPL-SCNC: 3.8 MMOL/L (ref 3.5–5.2)
PROT SERPL-MCNC: 7.3 G/DL (ref 6–8.5)
RBC # BLD AUTO: 4.22 10*6/MM3 (ref 3.77–5.28)
SODIUM SERPL-SCNC: 140 MMOL/L (ref 136–145)
TROPONIN T SERPL-MCNC: <0.01 NG/ML (ref 0–0.03)
WBC # BLD AUTO: 8.98 10*3/MM3 (ref 3.4–10.8)

## 2020-09-08 PROCEDURE — 25010000002 KETOROLAC TROMETHAMINE PER 15 MG: Performed by: PHYSICIAN ASSISTANT

## 2020-09-08 PROCEDURE — 85025 COMPLETE CBC W/AUTO DIFF WBC: CPT | Performed by: PHYSICIAN ASSISTANT

## 2020-09-08 PROCEDURE — 80053 COMPREHEN METABOLIC PANEL: CPT | Performed by: PHYSICIAN ASSISTANT

## 2020-09-08 PROCEDURE — 93010 ELECTROCARDIOGRAM REPORT: CPT | Performed by: INTERNAL MEDICINE

## 2020-09-08 PROCEDURE — 85379 FIBRIN DEGRADATION QUANT: CPT | Performed by: PHYSICIAN ASSISTANT

## 2020-09-08 PROCEDURE — 71046 X-RAY EXAM CHEST 2 VIEWS: CPT

## 2020-09-08 PROCEDURE — 99283 EMERGENCY DEPT VISIT LOW MDM: CPT | Performed by: PHYSICIAN ASSISTANT

## 2020-09-08 PROCEDURE — 84484 ASSAY OF TROPONIN QUANT: CPT | Performed by: PHYSICIAN ASSISTANT

## 2020-09-08 PROCEDURE — 84703 CHORIONIC GONADOTROPIN ASSAY: CPT | Performed by: PHYSICIAN ASSISTANT

## 2020-09-08 PROCEDURE — 96374 THER/PROPH/DIAG INJ IV PUSH: CPT

## 2020-09-08 PROCEDURE — 99284 EMERGENCY DEPT VISIT MOD MDM: CPT

## 2020-09-08 PROCEDURE — 93005 ELECTROCARDIOGRAM TRACING: CPT | Performed by: PHYSICIAN ASSISTANT

## 2020-09-08 RX ORDER — SODIUM CHLORIDE 0.9 % (FLUSH) 0.9 %
10 SYRINGE (ML) INJECTION AS NEEDED
Status: DISCONTINUED | OUTPATIENT
Start: 2020-09-08 | End: 2020-09-09 | Stop reason: HOSPADM

## 2020-09-08 RX ORDER — NAPROXEN 500 MG/1
500 TABLET ORAL 2 TIMES DAILY PRN
Qty: 14 TABLET | Refills: 0 | Status: SHIPPED | OUTPATIENT
Start: 2020-09-08 | End: 2020-09-23

## 2020-09-08 RX ORDER — METHOCARBAMOL 500 MG/1
500 TABLET, FILM COATED ORAL 3 TIMES DAILY PRN
Qty: 21 TABLET | Refills: 0 | Status: SHIPPED | OUTPATIENT
Start: 2020-09-08 | End: 2020-09-23

## 2020-09-08 RX ORDER — IBUPROFEN 200 MG
200 TABLET ORAL EVERY 6 HOURS PRN
COMMUNITY
End: 2020-09-08

## 2020-09-08 RX ORDER — METHOCARBAMOL 500 MG/1
750 TABLET, FILM COATED ORAL ONCE
Status: COMPLETED | OUTPATIENT
Start: 2020-09-08 | End: 2020-09-08

## 2020-09-08 RX ORDER — KETOROLAC TROMETHAMINE 30 MG/ML
30 INJECTION, SOLUTION INTRAMUSCULAR; INTRAVENOUS ONCE
Status: COMPLETED | OUTPATIENT
Start: 2020-09-08 | End: 2020-09-08

## 2020-09-08 RX ADMIN — METHOCARBAMOL 750 MG: 500 TABLET ORAL at 21:23

## 2020-09-08 RX ADMIN — KETOROLAC TROMETHAMINE 30 MG: 30 INJECTION, SOLUTION INTRAMUSCULAR at 21:26

## 2020-09-09 NOTE — ED PROVIDER NOTES
EMERGENCY DEPARTMENT ENCOUNTER      Room Number: D/D    History is provided by the patient, translation services with [employer] interpre[employer] utilized    HPI:    Chief complaint: Bilateral rib pain and right shoulder pain    Location: Bilateral rib cage, right shoulder, right thoracic region    Quality/Severity: 5/10, aching    Timing/Duration: 3 months, constant, more severe in the past few weeks.    Modifying Factors: Patient took 3 months off of work and is seeing an OB/GYN, had breast ultrasound and mammogram done that were normal.  Pain increases with deep inspiration or palpation.    Associated Symptoms: Positive for bilateral chest pain, rib pain, right shoulder pain.  Patient denies feeling short of breath but states it hurts to breathe.  Denies any fever, chills, cough, nausea, vomiting, abdominal pain.  She denies any leg pain or swelling, recent travel, recent surgeries, or prolonged immobility.  She denies any history of blood clots.    Narrative: Pt is a 40 y.o. female who presents complaining of bilateral rib pain for the past 3 months that is become more severe in the past few weeks.  Patient states she presents tonight because she is desperate for help and cannot tolerate this pain.  She states her breasts are tender as well, she is not breast-feeding, she reports normal periods.  When she points to where her breast tenderness is she is really pointing to her ribs bilaterally.  She is also planing of pain in the muscles throughout her right shoulder.  She denies any injury to the shoulder or decreased range of motion.  She states she tried taking ibuprofen for a week but it did not help her pain.  She states right before her pain worsens she does get a tingling sensation in her tongue, that quickly subsides.  She denies any numbness.  She denies any medical problems and does not take any medications on a regular basis.  She denies any smoking, drug use or alcohol use.  Surgical history of 2   sections.      PMD: Provider, No Known    REVIEW OF SYSTEMS  Review of Systems   Constitutional: Negative for chills and fever.   HENT: Negative for congestion and rhinorrhea.    Respiratory: Negative for cough, shortness of breath and wheezing.         Positive for pain with deep inspiration   Cardiovascular: Positive for chest pain. Negative for palpitations and leg swelling.   Gastrointestinal: Negative for abdominal pain, nausea and vomiting.   Genitourinary: Negative for difficulty urinating and dysuria.   Musculoskeletal: Positive for myalgias (Muscles around the right shoulder.). Negative for arthralgias.   Skin: Negative for rash and wound.   Neurological: Negative for dizziness and syncope.   Psychiatric/Behavioral: Negative for confusion. The patient is not nervous/anxious.          PAST MEDICAL HISTORY  Active Ambulatory Problems     Diagnosis Date Noted   • ASCUS with positive high risk HPV cervical 2017   • Breast pain 2020     Resolved Ambulatory Problems     Diagnosis Date Noted   • Well female exam with routine gynecological exam 2018     Past Medical History:   Diagnosis Date   • Abnormal Pap smear of cervix 2018       PAST SURGICAL HISTORY  Past Surgical History:   Procedure Laterality Date   •  SECTION      placenta previa   •  SECTION         FAMILY HISTORY  Family History   Problem Relation Age of Onset   • Breast cancer Neg Hx    • Ovarian cancer Neg Hx    • Uterine cancer Neg Hx    • Colon cancer Neg Hx    • Prostate cancer Neg Hx    • Deep vein thrombosis Neg Hx        SOCIAL HISTORY  Social History     Socioeconomic History   • Marital status: Single     Spouse name: Not on file   • Number of children: 2   • Years of education: Not on file   • Highest education level: Not on file   Occupational History   • Occupation: unemployed   Tobacco Use   • Smoking status: Never Smoker   • Smokeless tobacco: Never Used   Substance and Sexual Activity   •  Alcohol use: No   • Drug use: No   • Sexual activity: Yes     Partners: Male     Birth control/protection: Condom       ALLERGIES  Patient has no known allergies.      Current Facility-Administered Medications:   •  [COMPLETED] Insert peripheral IV, , , Once **AND** sodium chloride 0.9 % flush 10 mL, 10 mL, Intravenous, PRN, Marlyn Tena PA-C    Current Outpatient Medications:   •  methocarbamol (ROBAXIN) 500 MG tablet, Take 1 tablet by mouth 3 (Three) Times a Day As Needed for Muscle Spasms., Disp: 21 tablet, Rfl: 0  •  naproxen (NAPROSYN) 500 MG tablet, Take 1 tablet by mouth 2 (Two) Times a Day As Needed for Mild Pain ., Disp: 14 tablet, Rfl: 0  •  vitamin E 100 UNIT capsule, Take 100 Units by mouth Daily., Disp: , Rfl:     PHYSICAL EXAM  ED Triage Vitals [09/08/20 2045]   Temp Heart Rate Resp BP SpO2   98.4 °F (36.9 °C) 90 15 112/75 98 %      Temp src Heart Rate Source Patient Position BP Location FiO2 (%)   Oral Monitor Sitting Right arm --       Physical Exam   Constitutional: She is oriented to person, place, and time and well-developed, well-nourished, and in no distress. Vital signs are normal.  Non-toxic appearance.   HENT:   Head: Normocephalic and atraumatic.   Mouth/Throat: Mucous membranes are normal.   Eyes: Pupils are equal, round, and reactive to light. Conjunctivae are normal.   Neck: Normal range of motion. Neck supple.   Cardiovascular: Normal rate, regular rhythm and intact distal pulses.   Pulmonary/Chest: Effort normal and breath sounds normal. No respiratory distress. She has no wheezes. She exhibits tenderness (Patient is tender along the mid axillary line of her ribs bilaterally). Right breast exhibits no mass, no skin change and no tenderness. Left breast exhibits no mass, no skin change and no tenderness.   Silvia RN at bedside during breast exam.   Abdominal: Soft. Bowel sounds are normal. There is no tenderness. There is no guarding.   Musculoskeletal: Normal range of motion.  She exhibits no edema.   Neurological: She is alert and oriented to person, place, and time. GCS score is 15.   Skin: Skin is warm and dry.   Psychiatric: Memory, affect and judgment normal. Her mood appears anxious. She does not exhibit a depressed mood. She expresses no suicidal ideation.   Patient did become anxious and tearful during history taking.   Nursing note and vitals reviewed.        LAB RESULTS  Results for orders placed or performed during the hospital encounter of 09/08/20   Comprehensive Metabolic Panel   Result Value Ref Range    Glucose 118 (H) 65 - 99 mg/dL    BUN 13 6 - 20 mg/dL    Creatinine 0.77 0.57 - 1.00 mg/dL    Sodium 140 136 - 145 mmol/L    Potassium 3.8 3.5 - 5.2 mmol/L    Chloride 104 98 - 107 mmol/L    CO2 26.8 22.0 - 29.0 mmol/L    Calcium 8.9 8.6 - 10.5 mg/dL    Total Protein 7.3 6.0 - 8.5 g/dL    Albumin 4.30 3.50 - 5.20 g/dL    ALT (SGPT) 16 1 - 33 U/L    AST (SGOT) 27 1 - 32 U/L    Alkaline Phosphatase 88 39 - 117 U/L    Total Bilirubin <0.2 0.0 - 1.2 mg/dL    eGFR Non African Amer 83 >60 mL/min/1.73    Globulin 3.0 gm/dL    A/G Ratio 1.4 g/dL    BUN/Creatinine Ratio 16.9 7.0 - 25.0    Anion Gap 9.2 5.0 - 15.0 mmol/L   hCG, Serum, Qualitative   Result Value Ref Range    HCG Qualitative Negative Negative   D-dimer, Quantitative   Result Value Ref Range    D-Dimer, Quantitative 0.34 0.00 - 0.46 MCGFEU/mL   Troponin   Result Value Ref Range    Troponin T <0.010 0.000 - 0.030 ng/mL   CBC Auto Differential   Result Value Ref Range    WBC 8.98 3.40 - 10.80 10*3/mm3    RBC 4.22 3.77 - 5.28 10*6/mm3    Hemoglobin 12.8 12.0 - 15.9 g/dL    Hematocrit 40.8 34.0 - 46.6 %    MCV 96.7 79.0 - 97.0 fL    MCH 30.3 26.6 - 33.0 pg    MCHC 31.4 (L) 31.5 - 35.7 g/dL    RDW 12.4 12.3 - 15.4 %    RDW-SD 44.0 37.0 - 54.0 fl    MPV 10.3 6.0 - 12.0 fL    Platelets 303 140 - 450 10*3/mm3    Neutrophil % 57.3 42.7 - 76.0 %    Lymphocyte % 31.7 19.6 - 45.3 %    Monocyte % 9.7 5.0 - 12.0 %    Eosinophil % 0.6  0.3 - 6.2 %    Basophil % 0.3 0.0 - 1.5 %    Immature Grans % 0.4 0.0 - 0.5 %    Neutrophils, Absolute 5.14 1.70 - 7.00 10*3/mm3    Lymphocytes, Absolute 2.85 0.70 - 3.10 10*3/mm3    Monocytes, Absolute 0.87 0.10 - 0.90 10*3/mm3    Eosinophils, Absolute 0.05 0.00 - 0.40 10*3/mm3    Basophils, Absolute 0.03 0.00 - 0.20 10*3/mm3    Immature Grans, Absolute 0.04 0.00 - 0.05 10*3/mm3    nRBC 0.0 0.0 - 0.2 /100 WBC         I ordered the above labs and reviewed the results    RADIOLOGY  Xr Chest 2 View    Result Date: 9/8/2020  Narrative: CR Chest 2 Vws INDICATION:  One month history of bilateral rib pain COMPARISON:  None. FINDINGS: PA and lateral views of the chest.  Heart and mediastinal contours are normal. The lungs are free of focal consolidations. There is mild prominence of the bronchovascular interstitium. No pneumothorax or pleural effusion.      Impression: No focal consolidations identified. Mild prominence of the bronchovascular interstitium Signer Name: Tommy Dowell MD  Signed: 9/8/2020 9:53 PM  Workstation Name: [email or web address]  [facility]      I ordered the above radiologic testing and reviewed the results    PROCEDURES  Procedures      PROGRESS AND CONSULTS  ED Course as of Sep 08 2224   Tue Sep 08, 2020   2130 EKG         EKG time / Interpretation time: 2119/2124  Rhythm/Rate: Sinus rhythm, 82   AZ: 190  QRS, axis: 48  QTc 411  ST and T waves: There is no significant ST elevation or depression, there are no T wave inversions.  EKG Tracing Interpreted Contemporaneously by me, independently viewed by me and MD.  No prior EKG with which to compare.      [KS]   2212 Patient states her pain is only at a 1/10 right now after Toradol and Robaxin.  Her chest pain is certainly unusual for any cardiac ideology, her heart score is a 0 and I do not suspect any heart disease in this patient.  Her pain is reproducible with palpation and I have discussed that it is likely musculoskeletal in  nature.  I will prescribe her naproxen and Robaxin for home.  Discussed her lab and imaging findings here, her chest x-ray does not show any acute findings, and her labs appear reassuring.  I discussed that her troponin is negative and her d-dimer is normal.  We will treat her with anti-inflammatories and muscle relaxers since she did carry a lot of muscle tension in her right shoulder.  I discussed possibility of costochondritis, and have recommended she follow-up with the PCP for further evaluation if not improved.  She does not currently have a PCP so I will refer her to the [place] clinic.  Reviewed return to ER warnings, patient is agreeable with plan and verbalizes understanding.    [KS]      ED Course User Index  [KS] Marlyn Tena, ALEXEI           MEDICAL DECISION MAKING  Results were reviewed/discussed with the patient and they were also made aware of online access. Pt also made aware that some labs, such as cultures, will not be resulted during ER visit and follow up with PMD is necessary.     MDM      HEART Score (for prediction of 6-week risk of major adverse cardiac event) reviewed and/or performed as part of the patient evaluation and treatment planning process.  The result associated with this review/performance is: 0      DIAGNOSIS  Final diagnoses:   Musculoskeletal chest pain       Latest Documented Vital Signs:  As of 22:24  BP- 106/71 HR- 86 Temp- 98.4 °F (36.9 °C) (Oral) O2 sat- 99%    DISPOSITION  Patient discharged home in care of family member    Discussed pertinent labs and imaging findings with the patient/family.  Patient/Family voiced understanding of need to follow-up for recheck, further testing as needed.  Return to the emergency Department warnings were given.         Medication List      New Prescriptions    methocarbamol 500 MG tablet  Commonly known as:  ROBAXIN  Take 1 tablet by mouth 3 (Three) Times a Day As Needed for Muscle Spasms.     naproxen 500 MG tablet  Commonly known  as:  NAPROSYN  Take 1 tablet by mouth 2 (Two) Times a Day As Needed for Mild Pain .        Stop    ibuprofen 200 MG tablet  Commonly known as:  ADVMICKI JOLLEYRIN              Follow-up Information     Call  [facility].    Why:  As needed  Contact information:  [address] [place] [address] [phone]                   Dictated utilizing Dragon dictation     Marlyn Tena PA-C  09/08/20 6381

## 2020-09-09 NOTE — ED NOTES
"Via  pt reports \"the reason that I am here for pain to my right rib and back pain that started 3 months ago. I also have breast inflammation (to right)\".  Pt reports \"I can't stand the pain anymore\" as to what brought her to the ED. Pt reports a mammogram, ultrasound, and xrays were done last month with her OBGYN.  Pt reports her right breast feels \"thicker and more dense\". Normal menstrual cycles reported. Chest pain denied but pain with a deep breath.      Silvia Ross, RN  09/08/20 2058    "

## 2020-09-17 ENCOUNTER — OFFICE VISIT (OUTPATIENT)
Dept: OBSTETRICS AND GYNECOLOGY | Facility: CLINIC | Age: 40
End: 2020-09-17

## 2020-09-17 VITALS
SYSTOLIC BLOOD PRESSURE: 100 MMHG | DIASTOLIC BLOOD PRESSURE: 62 MMHG | HEIGHT: 66 IN | WEIGHT: 156 LBS | BODY MASS INDEX: 25.07 KG/M2

## 2020-09-17 DIAGNOSIS — R87.810 ASCUS WITH POSITIVE HIGH RISK HPV CERVICAL: ICD-10-CM

## 2020-09-17 DIAGNOSIS — N87.1 DYSPLASIA OF CERVIX, HIGH GRADE CIN 2: ICD-10-CM

## 2020-09-17 DIAGNOSIS — R87.610 ASCUS WITH POSITIVE HIGH RISK HPV CERVICAL: ICD-10-CM

## 2020-09-17 DIAGNOSIS — Z13.9 SCREENING FOR CONDITION: Primary | ICD-10-CM

## 2020-09-17 PROCEDURE — 81025 URINE PREGNANCY TEST: CPT | Performed by: OBSTETRICS & GYNECOLOGY

## 2020-09-17 PROCEDURE — 99214 OFFICE O/P EST MOD 30 MIN: CPT | Performed by: OBSTETRICS & GYNECOLOGY

## 2020-09-17 NOTE — PROGRESS NOTES
"      Marilee Stapleton is a 40 y.o. patient who presents for follow up of   Chief Complaint   Patient presents with   • Follow-up     discuss results     39 yo est pt here for discussion of her biopsy results. The  phone was used during the visit. In 4/2018 she had an ASCUS + HPV with no followup until 7/2020. Her pap was again ASCUS + HPV and she had RUMA 2 on biopsy. We discussed that she needs an excisional procedure, either LEEP vs CKC. She is interested in future pregnancy and so a LEEP would be more cervical sparing, but does have the limitation of unclear margins. She voices understanding but wants to do the least invasive procedure.         The following portions of the patient's history were reviewed and updated as appropriate: allergies, current medications and problem list.    Review of Systems   Constitutional: Negative for appetite change, fatigue, fever and unexpected weight change.   Eyes: Negative for photophobia and visual disturbance.   Respiratory: Negative for cough and shortness of breath.    Cardiovascular: Negative for chest pain and palpitations.   Gastrointestinal: Negative for abdominal distention, abdominal pain, constipation, diarrhea and nausea.   Endocrine: Negative for cold intolerance and heat intolerance.   Genitourinary: Negative for dyspareunia, dysuria, menstrual problem, pelvic pain and vaginal discharge.   Musculoskeletal: Negative for back pain.   Skin: Negative for color change and rash.   Neurological: Negative for headaches.   Hematological: Negative for adenopathy. Does not bruise/bleed easily.   Psychiatric/Behavioral: Negative for dysphoric mood. The patient is not nervous/anxious.        /62   Ht 167.6 cm (66\")   Wt 70.8 kg (156 lb)   LMP 08/18/2020 (Approximate)   Breastfeeding No   BMI 25.18 kg/m²     Physical Exam  Vitals signs and nursing note reviewed.   Constitutional:       Appearance: She is well-developed.   HENT:      Head: Normocephalic and " atraumatic.   Eyes:      General: No scleral icterus.     Conjunctiva/sclera: Conjunctivae normal.   Neck:      Musculoskeletal: Neck supple.      Thyroid: No thyromegaly.   Cardiovascular:      Rate and Rhythm: Normal rate and regular rhythm.   Pulmonary:      Effort: Pulmonary effort is normal.      Breath sounds: Normal breath sounds.   Abdominal:      General: There is no distension.      Palpations: Abdomen is soft. There is no mass.      Tenderness: There is no abdominal tenderness. There is no guarding or rebound.      Hernia: No hernia is present.   Skin:     General: Skin is warm and dry.   Neurological:      Mental Status: She is alert and oriented to person, place, and time.   Psychiatric:         Behavior: Behavior normal.         Thought Content: Thought content normal.         Judgment: Judgment normal.         A/P:  1. RUMA 2- plan LEEP in the OR. Risks, benefits and alternatives of the procedure were discussed, including , but not limited to: infection, bleeding, transfusion, injury to adjacent structures, laparotomy, possible non diagnostic findings, possible findings of unexpected malignancy, reoperation, recurrent symptoms, thromboembolic events, aneasthesic complications and death. We also discussed the risk of cervical shortening and  labor. We discussed the need for close followup with repeat paps post procedure.  Pre/intra/postop course was reviewed and all questions answered. Patient was encouraged to call for any additional questions she might have in the future.         Assessment/Plan   Marilee was seen today for follow-up.    Diagnoses and all orders for this visit:    Screening for condition  -     POC Pregnancy, Urine                 No follow-ups on file.      Sarah Berger MD    2020  20:25 EDT

## 2020-09-21 PROBLEM — N87.1 DYSPLASIA OF CERVIX, HIGH GRADE CIN 2: Status: ACTIVE | Noted: 2020-09-21

## 2020-09-23 ENCOUNTER — APPOINTMENT (OUTPATIENT)
Dept: PREADMISSION TESTING | Facility: HOSPITAL | Age: 40
End: 2020-09-23

## 2020-09-23 ENCOUNTER — TRANSCRIBE ORDERS (OUTPATIENT)
Dept: ADMINISTRATIVE | Facility: HOSPITAL | Age: 40
End: 2020-09-23

## 2020-09-23 VITALS
RESPIRATION RATE: 16 BRPM | SYSTOLIC BLOOD PRESSURE: 99 MMHG | WEIGHT: 152 LBS | HEART RATE: 87 BPM | HEIGHT: 66 IN | BODY MASS INDEX: 24.43 KG/M2 | OXYGEN SATURATION: 100 % | DIASTOLIC BLOOD PRESSURE: 70 MMHG

## 2020-09-23 DIAGNOSIS — U07.1 COVID-19: Primary | ICD-10-CM

## 2020-09-26 ENCOUNTER — LAB (OUTPATIENT)
Dept: LAB | Facility: HOSPITAL | Age: 40
End: 2020-09-26

## 2020-09-26 DIAGNOSIS — U07.1 COVID-19: ICD-10-CM

## 2020-09-26 PROCEDURE — U0004 COV-19 TEST NON-CDC HGH THRU: HCPCS

## 2020-09-26 PROCEDURE — C9803 HOPD COVID-19 SPEC COLLECT: HCPCS

## 2020-09-27 LAB
SARS-COV-2 RNA NOSE QL NAA+PROBE: NOT DETECTED
SARS-COV-2 RNA RESP QL NAA+PROBE: NORMAL

## 2020-09-28 ENCOUNTER — ANESTHESIA EVENT (OUTPATIENT)
Dept: PERIOP | Facility: HOSPITAL | Age: 40
End: 2020-09-28

## 2020-09-28 PROCEDURE — S0260 H&P FOR SURGERY: HCPCS | Performed by: OBSTETRICS & GYNECOLOGY

## 2020-09-29 ENCOUNTER — HOSPITAL ENCOUNTER (OUTPATIENT)
Facility: HOSPITAL | Age: 40
Setting detail: HOSPITAL OUTPATIENT SURGERY
Discharge: HOME | End: 2020-09-29
Attending: OBSTETRICS & GYNECOLOGY | Admitting: OBSTETRICS & GYNECOLOGY

## 2020-09-29 ENCOUNTER — ANESTHESIA (OUTPATIENT)
Dept: PERIOP | Facility: HOSPITAL | Age: 40
End: 2020-09-29

## 2020-09-29 VITALS
OXYGEN SATURATION: 98 % | SYSTOLIC BLOOD PRESSURE: 94 MMHG | WEIGHT: 153.2 LBS | TEMPERATURE: 97.9 F | RESPIRATION RATE: 17 BRPM | BODY MASS INDEX: 24.73 KG/M2 | DIASTOLIC BLOOD PRESSURE: 64 MMHG | HEART RATE: 64 BPM

## 2020-09-29 DIAGNOSIS — Z98.890 S/P LEEP: Primary | ICD-10-CM

## 2020-09-29 DIAGNOSIS — R87.610 ASCUS WITH POSITIVE HIGH RISK HPV CERVICAL: ICD-10-CM

## 2020-09-29 DIAGNOSIS — N87.1 DYSPLASIA OF CERVIX, HIGH GRADE CIN 2: ICD-10-CM

## 2020-09-29 DIAGNOSIS — R87.810 ASCUS WITH POSITIVE HIGH RISK HPV CERVICAL: ICD-10-CM

## 2020-09-29 LAB — HCG SERPL QL: NEGATIVE

## 2020-09-29 PROCEDURE — 25010000002 KETOROLAC TROMETHAMINE PER 15 MG: Performed by: NURSE ANESTHETIST, CERTIFIED REGISTERED

## 2020-09-29 PROCEDURE — 25010000002 DEXAMETHASONE PER 1 MG: Performed by: NURSE ANESTHETIST, CERTIFIED REGISTERED

## 2020-09-29 PROCEDURE — 25010000002 MIDAZOLAM PER 1MG: Performed by: NURSE ANESTHETIST, CERTIFIED REGISTERED

## 2020-09-29 PROCEDURE — 88307 TISSUE EXAM BY PATHOLOGIST: CPT | Performed by: OBSTETRICS & GYNECOLOGY

## 2020-09-29 PROCEDURE — 57522 CONIZATION OF CERVIX: CPT | Performed by: OBSTETRICS & GYNECOLOGY

## 2020-09-29 PROCEDURE — 88305 TISSUE EXAM BY PATHOLOGIST: CPT | Performed by: OBSTETRICS & GYNECOLOGY

## 2020-09-29 PROCEDURE — 25010000002 ONDANSETRON PER 1 MG: Performed by: NURSE ANESTHETIST, CERTIFIED REGISTERED

## 2020-09-29 PROCEDURE — 84703 CHORIONIC GONADOTROPIN ASSAY: CPT | Performed by: NURSE ANESTHETIST, CERTIFIED REGISTERED

## 2020-09-29 PROCEDURE — 25010000002 PROPOFOL 10 MG/ML EMULSION: Performed by: NURSE ANESTHETIST, CERTIFIED REGISTERED

## 2020-09-29 RX ORDER — FAMOTIDINE 10 MG/ML
20 INJECTION, SOLUTION INTRAVENOUS
Status: COMPLETED | OUTPATIENT
Start: 2020-09-29 | End: 2020-09-29

## 2020-09-29 RX ORDER — SODIUM CHLORIDE 0.9 % (FLUSH) 0.9 %
10 SYRINGE (ML) INJECTION EVERY 12 HOURS SCHEDULED
Status: DISCONTINUED | OUTPATIENT
Start: 2020-09-29 | End: 2020-09-29 | Stop reason: HOSPADM

## 2020-09-29 RX ORDER — DEXAMETHASONE SODIUM PHOSPHATE 4 MG/ML
8 INJECTION, SOLUTION INTRA-ARTICULAR; INTRALESIONAL; INTRAMUSCULAR; INTRAVENOUS; SOFT TISSUE ONCE
Status: COMPLETED | OUTPATIENT
Start: 2020-09-29 | End: 2020-09-29

## 2020-09-29 RX ORDER — SODIUM CHLORIDE, SODIUM LACTATE, POTASSIUM CHLORIDE, CALCIUM CHLORIDE 600; 310; 30; 20 MG/100ML; MG/100ML; MG/100ML; MG/100ML
9 INJECTION, SOLUTION INTRAVENOUS CONTINUOUS PRN
Status: DISCONTINUED | OUTPATIENT
Start: 2020-09-29 | End: 2020-09-29 | Stop reason: HOSPADM

## 2020-09-29 RX ORDER — LIDOCAINE HYDROCHLORIDE 20 MG/ML
INJECTION, SOLUTION INFILTRATION; PERINEURAL AS NEEDED
Status: DISCONTINUED | OUTPATIENT
Start: 2020-09-29 | End: 2020-09-29 | Stop reason: SURG

## 2020-09-29 RX ORDER — LIDOCAINE HYDROCHLORIDE 10 MG/ML
0.5 INJECTION, SOLUTION EPIDURAL; INFILTRATION; INTRACAUDAL; PERINEURAL ONCE AS NEEDED
Status: DISCONTINUED | OUTPATIENT
Start: 2020-09-29 | End: 2020-09-29 | Stop reason: HOSPADM

## 2020-09-29 RX ORDER — PYRANTEL PAMOATE 100 %
POWDER (GRAM) MISCELLANEOUS AS NEEDED
Status: DISCONTINUED | OUTPATIENT
Start: 2020-09-29 | End: 2020-09-29 | Stop reason: HOSPADM

## 2020-09-29 RX ORDER — PROPOFOL 10 MG/ML
VIAL (ML) INTRAVENOUS AS NEEDED
Status: DISCONTINUED | OUTPATIENT
Start: 2020-09-29 | End: 2020-09-29 | Stop reason: SURG

## 2020-09-29 RX ORDER — HYDROMORPHONE HYDROCHLORIDE 1 MG/ML
0.25 INJECTION, SOLUTION INTRAMUSCULAR; INTRAVENOUS; SUBCUTANEOUS
Status: DISCONTINUED | OUTPATIENT
Start: 2020-09-29 | End: 2020-09-29 | Stop reason: HOSPADM

## 2020-09-29 RX ORDER — SODIUM CHLORIDE 0.9 % (FLUSH) 0.9 %
10 SYRINGE (ML) INJECTION AS NEEDED
Status: DISCONTINUED | OUTPATIENT
Start: 2020-09-29 | End: 2020-09-29 | Stop reason: HOSPADM

## 2020-09-29 RX ORDER — HYDROCODONE BITARTRATE AND ACETAMINOPHEN 5; 325 MG/1; MG/1
1 TABLET ORAL EVERY 4 HOURS PRN
Qty: 15 TABLET | Refills: 0 | Status: SHIPPED | OUTPATIENT
Start: 2020-09-29 | End: 2021-01-25

## 2020-09-29 RX ORDER — SODIUM CHLORIDE 9 MG/ML
40 INJECTION, SOLUTION INTRAVENOUS AS NEEDED
Status: DISCONTINUED | OUTPATIENT
Start: 2020-09-29 | End: 2020-09-29 | Stop reason: HOSPADM

## 2020-09-29 RX ORDER — MIDAZOLAM HYDROCHLORIDE 2 MG/2ML
1 INJECTION, SOLUTION INTRAMUSCULAR; INTRAVENOUS
Status: DISCONTINUED | OUTPATIENT
Start: 2020-09-29 | End: 2020-09-29 | Stop reason: HOSPADM

## 2020-09-29 RX ORDER — MAGNESIUM HYDROXIDE 1200 MG/15ML
LIQUID ORAL AS NEEDED
Status: DISCONTINUED | OUTPATIENT
Start: 2020-09-29 | End: 2020-09-29 | Stop reason: HOSPADM

## 2020-09-29 RX ORDER — ONDANSETRON 2 MG/ML
4 INJECTION INTRAMUSCULAR; INTRAVENOUS ONCE AS NEEDED
Status: COMPLETED | OUTPATIENT
Start: 2020-09-29 | End: 2020-09-29

## 2020-09-29 RX ORDER — FERRIC SUBSULFATE 0.21 G/G
LIQUID TOPICAL AS NEEDED
Status: DISCONTINUED | OUTPATIENT
Start: 2020-09-29 | End: 2020-09-29 | Stop reason: HOSPADM

## 2020-09-29 RX ORDER — ONDANSETRON 2 MG/ML
4 INJECTION INTRAMUSCULAR; INTRAVENOUS ONCE AS NEEDED
Status: DISCONTINUED | OUTPATIENT
Start: 2020-09-29 | End: 2020-09-29 | Stop reason: HOSPADM

## 2020-09-29 RX ORDER — KETOROLAC TROMETHAMINE 30 MG/ML
INJECTION, SOLUTION INTRAMUSCULAR; INTRAVENOUS AS NEEDED
Status: DISCONTINUED | OUTPATIENT
Start: 2020-09-29 | End: 2020-09-29 | Stop reason: SURG

## 2020-09-29 RX ORDER — IBUPROFEN 600 MG/1
600 TABLET ORAL EVERY 6 HOURS PRN
Qty: 30 TABLET | Refills: 0 | OUTPATIENT
Start: 2020-09-29 | End: 2021-01-25

## 2020-09-29 RX ADMIN — KETOROLAC TROMETHAMINE 30 MG: 30 INJECTION INTRAMUSCULAR; INTRAVENOUS at 10:51

## 2020-09-29 RX ADMIN — PROPOFOL 50 MG: 10 INJECTION, EMULSION INTRAVENOUS at 10:47

## 2020-09-29 RX ADMIN — PROPOFOL 50 MG: 10 INJECTION, EMULSION INTRAVENOUS at 10:46

## 2020-09-29 RX ADMIN — SODIUM CHLORIDE, POTASSIUM CHLORIDE, SODIUM LACTATE AND CALCIUM CHLORIDE 9 ML/HR: 600; 310; 30; 20 INJECTION, SOLUTION INTRAVENOUS at 10:04

## 2020-09-29 RX ADMIN — LIDOCAINE HYDROCHLORIDE 100 MG: 20 INJECTION, SOLUTION INFILTRATION; PERINEURAL at 10:46

## 2020-09-29 RX ADMIN — MIDAZOLAM HYDROCHLORIDE 1 MG: 1 INJECTION, SOLUTION INTRAMUSCULAR; INTRAVENOUS at 10:17

## 2020-09-29 RX ADMIN — PROPOFOL 50 MG: 10 INJECTION, EMULSION INTRAVENOUS at 11:01

## 2020-09-29 RX ADMIN — FAMOTIDINE 20 MG: 10 INJECTION INTRAVENOUS at 10:06

## 2020-09-29 RX ADMIN — PROPOFOL 50 MG: 10 INJECTION, EMULSION INTRAVENOUS at 10:53

## 2020-09-29 RX ADMIN — ONDANSETRON 4 MG: 2 INJECTION, SOLUTION INTRAMUSCULAR; INTRAVENOUS at 10:06

## 2020-09-29 RX ADMIN — DEXAMETHASONE SODIUM PHOSPHATE 8 MG: 4 INJECTION, SOLUTION INTRAMUSCULAR; INTRAVENOUS at 10:05

## 2020-09-29 RX ADMIN — PROPOFOL 50 MG: 10 INJECTION, EMULSION INTRAVENOUS at 10:58

## 2020-09-29 NOTE — ANESTHESIA PREPROCEDURE EVALUATION
Anesthesia Evaluation     Patient summary reviewed and Nursing notes reviewed   no history of anesthetic complications:  NPO Solid Status: > 8 hours  NPO Liquid Status: > 8 hours           Airway   Mallampati: II  TM distance: >3 FB  Neck ROM: full  No difficulty expected  Dental      Pulmonary - negative pulmonary ROS    breath sounds clear to auscultation  Cardiovascular - negative cardio ROS  Exercise tolerance: good (4-7 METS)    ECG reviewed  Rhythm: regular  Rate: normal      ROS comment: ECG 12 Lead  Order: [id number]  Status:  Final result   Visible to patient:  No (not released) Next appt:  10/12/2020 at 04:15 PM in Obstetrics and Gynecology (Sarah Berger MD)    Narrative & Impression      HEART RATE= 82  bpm  RR Interval= 732  ms  DC Interval= 190  ms  P Horizontal Axis= -6  deg  P Front Axis= 65  deg  QRSD Interval= 92  ms  QT Interval= 352  ms  QRS Axis= 48  deg  T Wave Axis= 53  deg  - NORMAL ECG -  Sinus rhythm  NO PRIOR TRACING AVAILABLE FOR COMPARISON  Electronically Signed By: Bubba Cota ([facility]) 10-Sep-2020 09:59:10  Date and Time of Study: 2020-09-08 21:19:13            Neuro/Psych- negative ROS  GI/Hepatic/Renal/Endo - negative ROS     Musculoskeletal         ROS comment: Right arm pain radiating to her back  Abdominal    Substance History - negative use     OB/GYN          Other      history of cancer active    Blood dyscrasia:  Dysplasia of cervix, high grade RUMA 2.                  Anesthesia Plan    ASA 2     MAC     intravenous induction     Anesthetic plan, all risks, benefits, and alternatives have been provided, discussed and informed consent has been obtained with: patient.  Use of blood products discussed with patient  Consented to blood products.

## 2020-09-29 NOTE — ANESTHESIA POSTPROCEDURE EVALUATION
Patient: Marilee Stapleton    Procedure Summary     Date: 09/29/20 Room / Location:  [facility] OR 2 /  [facility] OR    Anesthesia Start: 1044 Anesthesia Stop: 1114    Procedure: LOOP ELECTROCAUTERY EXCISION PROCEDURE (N/A Cervix) Diagnosis:       ASCUS with positive high risk HPV cervical      Dysplasia of cervix, high grade RUMA 2      (ASCUS with positive high risk HPV cervical [R87.610, R87.810])      (Dysplasia of cervix, high grade RUMA 2 [N87.1])    Surgeon: Sarah Berger MD Provider: Wilfred Hayden CRNA    Anesthesia Type: MAC ASA Status: 2          Anesthesia Type: MAC    Vitals  Vitals Value Taken Time   BP 87/62 09/29/20 1130   Temp 97.9 °F (36.6 °C) 09/29/20 1111   Pulse 67 09/29/20 1130   Resp 17 09/29/20 1130   SpO2 97 % 09/29/20 1130           Post Anesthesia Care and Evaluation    Patient location during evaluation: PHASE II  Patient participation: complete - patient participated  Level of consciousness: awake  Pain management: adequate  Airway patency: patent  Anesthetic complications: No anesthetic complications  PONV Status: none  Cardiovascular status: acceptable  Respiratory status: acceptable  Hydration status: acceptable

## 2020-10-01 LAB
CYTO UR: NORMAL
LAB AP CASE REPORT: NORMAL
PATH REPORT.FINAL DX SPEC: NORMAL
PATH REPORT.GROSS SPEC: NORMAL

## 2020-10-01 NOTE — PROGRESS NOTES
PIP= LEEP specimen does not show any dysplasia.  We will discuss further at her follow-up visit.  Repeat Pap in 6 months.

## 2020-10-05 ENCOUNTER — TRANSCRIBE ORDERS (OUTPATIENT)
Dept: ADMINISTRATIVE | Facility: HOSPITAL | Age: 40
End: 2020-10-05

## 2020-10-05 DIAGNOSIS — R10.11 RUQ PAIN: Primary | ICD-10-CM

## 2020-10-08 ENCOUNTER — HOSPITAL ENCOUNTER (OUTPATIENT)
Dept: ULTRASOUND IMAGING | Facility: HOSPITAL | Age: 40
Discharge: HOME | End: 2020-10-08
Admitting: NURSE PRACTITIONER

## 2020-10-08 DIAGNOSIS — R10.11 RUQ PAIN: ICD-10-CM

## 2020-10-08 PROCEDURE — 76705 ECHO EXAM OF ABDOMEN: CPT

## 2020-10-12 ENCOUNTER — OFFICE VISIT (OUTPATIENT)
Dept: OBSTETRICS AND GYNECOLOGY | Facility: CLINIC | Age: 40
End: 2020-10-12

## 2020-10-12 VITALS
BODY MASS INDEX: 24.44 KG/M2 | DIASTOLIC BLOOD PRESSURE: 58 MMHG | HEIGHT: 66 IN | WEIGHT: 152.1 LBS | SYSTOLIC BLOOD PRESSURE: 100 MMHG

## 2020-10-12 DIAGNOSIS — Z13.9 SCREENING FOR CONDITION: Primary | ICD-10-CM

## 2020-10-12 DIAGNOSIS — Z98.890 S/P LEEP: ICD-10-CM

## 2020-10-12 DIAGNOSIS — Z09 POSTOP CHECK: ICD-10-CM

## 2020-10-12 LAB
B-HCG UR QL: NEGATIVE
BILIRUB BLD-MCNC: NEGATIVE MG/DL
CLARITY, POC: CLEAR
COLOR UR: YELLOW
GLUCOSE UR STRIP-MCNC: NEGATIVE MG/DL
INTERNAL NEGATIVE CONTROL: NEGATIVE
INTERNAL POSITIVE CONTROL: POSITIVE
KETONES UR QL: NEGATIVE
LEUKOCYTE EST, POC: NEGATIVE
Lab: 55
NITRITE UR-MCNC: NEGATIVE MG/ML
PH UR: 5 [PH] (ref 5–8)
PROT UR STRIP-MCNC: NEGATIVE MG/DL
RBC # UR STRIP: NEGATIVE /UL
SP GR UR: 1.03 (ref 1–1.03)
UROBILINOGEN UR QL: NORMAL

## 2020-10-12 PROCEDURE — 99024 POSTOP FOLLOW-UP VISIT: CPT | Performed by: OBSTETRICS & GYNECOLOGY

## 2020-10-12 PROCEDURE — 81025 URINE PREGNANCY TEST: CPT | Performed by: OBSTETRICS & GYNECOLOGY

## 2020-10-12 PROCEDURE — 81002 URINALYSIS NONAUTO W/O SCOPE: CPT | Performed by: OBSTETRICS & GYNECOLOGY

## 2020-10-12 NOTE — PROGRESS NOTES
"Surgical follow up visit     Marilee Stapleton is a 40 y.o. female who presents to the clinic 2 weeks status post LEEP for CIN2 Eating a regular diet without difficulty. Bowel movements are normal. The patient is not having any pain..  Vaginal bleeding is none. She did well after her procedure and had no issues, just some discharge right afterwards. We discussed that her    The following portions of the patient's history were reviewed and updated as appropriate: allergies, current medications, past family history, past medical history, past social history, past surgical history and problem list.    Review of Systems  Review of Systems   Constitutional: Positive for activity change. Negative for appetite change, fatigue, fever and unexpected weight change.   Eyes: Negative for photophobia and visual disturbance.   Respiratory: Negative for cough and shortness of breath.    Cardiovascular: Negative for chest pain and palpitations.   Gastrointestinal: Negative for abdominal distention, abdominal pain, constipation, diarrhea and nausea.   Endocrine: Negative for cold intolerance and heat intolerance.   Genitourinary: Positive for vaginal discharge. Negative for dyspareunia, dysuria, menstrual problem and pelvic pain.   Musculoskeletal: Negative for back pain.   Skin: Negative for color change and rash.   Neurological: Negative for headaches.   Hematological: Negative for adenopathy. Does not bruise/bleed easily.   Psychiatric/Behavioral: Negative for dysphoric mood. The patient is not nervous/anxious.         Objective    /58   Ht 167.6 cm (65.98\")   Wt 69 kg (152 lb 1.6 oz)   LMP 09/28/2020 (Exact Date)   Breastfeeding No   BMI 24.56 kg/m²     Physical Exam  Vitals signs and nursing note reviewed. Exam conducted with a chaperone present.   Constitutional:       Appearance: Normal appearance. She is well-developed and normal weight.   HENT:      Head: Normocephalic and atraumatic.   Eyes:      General: No scleral " icterus.     Conjunctiva/sclera: Conjunctivae normal.   Neck:      Musculoskeletal: Neck supple.      Thyroid: No thyromegaly.   Abdominal:      General: Bowel sounds are normal. There is no distension.      Palpations: Abdomen is soft. There is no mass.      Tenderness: There is no abdominal tenderness. There is no guarding or rebound.      Hernia: No hernia is present.   Genitourinary:     Vagina: No signs of injury and foreign body. No vaginal discharge, erythema, tenderness, bleeding or lesions.      Cervix: Friability present. No cervical motion tenderness, discharge, lesion or erythema.      Uterus: Normal.       Adnexa: Right adnexa normal and left adnexa normal.      Rectum: Normal.      Comments: LEEP bed is healing well.   Skin:     General: Skin is warm and dry.   Neurological:      Mental Status: She is alert and oriented to person, place, and time.   Psychiatric:         Behavior: Behavior normal.         Thought Content: Thought content normal.         Judgment: Judgment normal.         Assessment     1) Post op LEEP  Doing well postoperatively. Pt had ASCUS + HPV pap since 2018 and RUMA 2 on bx. Both LEEP lips are negative for dysplasia and her ECC is negative. Repeat pap in 6 months  2) Operative findings again reviewed. Pathology report discussed.      Plan    1. Continue any current medications.  2. Wound care discussed.  3. Activity restrictions: none  4. Anticipated return to work: now.  5. Follow up: 4/2021 for 6 month repeat pap    Sarah Berger MD     10/12/2020     16:29 EDT

## 2021-01-25 ENCOUNTER — APPOINTMENT (OUTPATIENT)
Dept: CT IMAGING | Facility: HOSPITAL | Age: 41
End: 2021-01-25

## 2021-01-25 ENCOUNTER — HOSPITAL ENCOUNTER (EMERGENCY)
Facility: HOSPITAL | Age: 41
Discharge: HOME | End: 2021-01-25
Attending: EMERGENCY MEDICINE | Admitting: EMERGENCY MEDICINE

## 2021-01-25 VITALS
HEIGHT: 68 IN | RESPIRATION RATE: 18 BRPM | TEMPERATURE: 98.3 F | WEIGHT: 150 LBS | HEART RATE: 119 BPM | OXYGEN SATURATION: 100 % | BODY MASS INDEX: 22.73 KG/M2 | SYSTOLIC BLOOD PRESSURE: 108 MMHG | DIASTOLIC BLOOD PRESSURE: 78 MMHG

## 2021-01-25 DIAGNOSIS — K29.00 ACUTE GASTRITIS WITHOUT HEMORRHAGE, UNSPECIFIED GASTRITIS TYPE: Primary | ICD-10-CM

## 2021-01-25 DIAGNOSIS — K21.00 GASTROESOPHAGEAL REFLUX DISEASE WITH ESOPHAGITIS WITHOUT HEMORRHAGE: ICD-10-CM

## 2021-01-25 LAB
ALBUMIN SERPL-MCNC: 4.1 G/DL (ref 3.5–5.2)
ALBUMIN/GLOB SERPL: 1.2 G/DL
ALP SERPL-CCNC: 85 U/L (ref 39–117)
ALT SERPL W P-5'-P-CCNC: 19 U/L (ref 1–33)
ANION GAP SERPL CALCULATED.3IONS-SCNC: 9.3 MMOL/L (ref 5–15)
AST SERPL-CCNC: 15 U/L (ref 1–32)
BACTERIA UR QL AUTO: ABNORMAL /HPF
BASOPHILS # BLD AUTO: 0.02 10*3/MM3 (ref 0–0.2)
BASOPHILS NFR BLD AUTO: 0.2 % (ref 0–1.5)
BILIRUB SERPL-MCNC: 0.3 MG/DL (ref 0–1.2)
BILIRUB UR QL STRIP: NEGATIVE
BUN SERPL-MCNC: 11 MG/DL (ref 6–20)
BUN/CREAT SERPL: 15.5 (ref 7–25)
CALCIUM SPEC-SCNC: 8.7 MG/DL (ref 8.6–10.5)
CHLORIDE SERPL-SCNC: 99 MMOL/L (ref 98–107)
CLARITY UR: CLEAR
CO2 SERPL-SCNC: 29.7 MMOL/L (ref 22–29)
COLOR UR: YELLOW
CREAT SERPL-MCNC: 0.71 MG/DL (ref 0.57–1)
DEPRECATED RDW RBC AUTO: 45.6 FL (ref 37–54)
EOSINOPHIL # BLD AUTO: 0.03 10*3/MM3 (ref 0–0.4)
EOSINOPHIL NFR BLD AUTO: 0.3 % (ref 0.3–6.2)
ERYTHROCYTE [DISTWIDTH] IN BLOOD BY AUTOMATED COUNT: 12.7 % (ref 12.3–15.4)
GFR SERPL CREATININE-BSD FRML MDRD: 92 ML/MIN/1.73
GLOBULIN UR ELPH-MCNC: 3.3 GM/DL
GLUCOSE SERPL-MCNC: 88 MG/DL (ref 65–99)
GLUCOSE UR STRIP-MCNC: NEGATIVE MG/DL
HCG SERPL QL: NEGATIVE
HCT VFR BLD AUTO: 45.3 % (ref 34–46.6)
HGB BLD-MCNC: 14.1 G/DL (ref 12–15.9)
HGB UR QL STRIP.AUTO: ABNORMAL
HYALINE CASTS UR QL AUTO: ABNORMAL /LPF
IMM GRANULOCYTES # BLD AUTO: 0.03 10*3/MM3 (ref 0–0.05)
IMM GRANULOCYTES NFR BLD AUTO: 0.3 % (ref 0–0.5)
KETONES UR QL STRIP: NEGATIVE
LEUKOCYTE ESTERASE UR QL STRIP.AUTO: NEGATIVE
LIPASE SERPL-CCNC: 17 U/L (ref 13–60)
LYMPHOCYTES # BLD AUTO: 2.11 10*3/MM3 (ref 0.7–3.1)
LYMPHOCYTES NFR BLD AUTO: 19.2 % (ref 19.6–45.3)
MCH RBC QN AUTO: 30.4 PG (ref 26.6–33)
MCHC RBC AUTO-ENTMCNC: 31.1 G/DL (ref 31.5–35.7)
MCV RBC AUTO: 97.6 FL (ref 79–97)
MONOCYTES # BLD AUTO: 0.76 10*3/MM3 (ref 0.1–0.9)
MONOCYTES NFR BLD AUTO: 6.9 % (ref 5–12)
NEUTROPHILS NFR BLD AUTO: 73.1 % (ref 42.7–76)
NEUTROPHILS NFR BLD AUTO: 8.04 10*3/MM3 (ref 1.7–7)
NITRITE UR QL STRIP: NEGATIVE
PH UR STRIP.AUTO: 7 [PH] (ref 4.5–8)
PLATELET # BLD AUTO: 320 10*3/MM3 (ref 140–450)
PMV BLD AUTO: 10 FL (ref 6–12)
POTASSIUM SERPL-SCNC: 3.8 MMOL/L (ref 3.5–5.2)
PROT SERPL-MCNC: 7.4 G/DL (ref 6–8.5)
PROT UR QL STRIP: NEGATIVE
RBC # BLD AUTO: 4.64 10*6/MM3 (ref 3.77–5.28)
RBC # UR: ABNORMAL /HPF
REF LAB TEST METHOD: ABNORMAL
SODIUM SERPL-SCNC: 138 MMOL/L (ref 136–145)
SP GR UR STRIP: 1.02 (ref 1–1.03)
SQUAMOUS #/AREA URNS HPF: ABNORMAL /HPF
UROBILINOGEN UR QL STRIP: ABNORMAL
WBC # BLD AUTO: 10.99 10*3/MM3 (ref 3.4–10.8)
WBC UR QL AUTO: ABNORMAL /HPF

## 2021-01-25 PROCEDURE — 83690 ASSAY OF LIPASE: CPT | Performed by: EMERGENCY MEDICINE

## 2021-01-25 PROCEDURE — 85025 COMPLETE CBC W/AUTO DIFF WBC: CPT | Performed by: EMERGENCY MEDICINE

## 2021-01-25 PROCEDURE — 96374 THER/PROPH/DIAG INJ IV PUSH: CPT

## 2021-01-25 PROCEDURE — 25010000002 ONDANSETRON PER 1 MG: Performed by: EMERGENCY MEDICINE

## 2021-01-25 PROCEDURE — 80053 COMPREHEN METABOLIC PANEL: CPT | Performed by: EMERGENCY MEDICINE

## 2021-01-25 PROCEDURE — 74177 CT ABD & PELVIS W/CONTRAST: CPT

## 2021-01-25 PROCEDURE — 96375 TX/PRO/DX INJ NEW DRUG ADDON: CPT

## 2021-01-25 PROCEDURE — 0 IOPAMIDOL PER 1 ML: Performed by: EMERGENCY MEDICINE

## 2021-01-25 PROCEDURE — 84703 CHORIONIC GONADOTROPIN ASSAY: CPT | Performed by: EMERGENCY MEDICINE

## 2021-01-25 PROCEDURE — 99283 EMERGENCY DEPT VISIT LOW MDM: CPT

## 2021-01-25 PROCEDURE — 81001 URINALYSIS AUTO W/SCOPE: CPT | Performed by: EMERGENCY MEDICINE

## 2021-01-25 PROCEDURE — 99284 EMERGENCY DEPT VISIT MOD MDM: CPT | Performed by: EMERGENCY MEDICINE

## 2021-01-25 RX ORDER — ONDANSETRON 2 MG/ML
8 INJECTION INTRAMUSCULAR; INTRAVENOUS ONCE
Status: COMPLETED | OUTPATIENT
Start: 2021-01-25 | End: 2021-01-25

## 2021-01-25 RX ORDER — ONDANSETRON 8 MG/1
TABLET, ORALLY DISINTEGRATING ORAL
Qty: 10 TABLET | Refills: 0 | Status: SHIPPED | OUTPATIENT
Start: 2021-01-25 | End: 2021-08-16

## 2021-01-25 RX ORDER — OMEPRAZOLE 20 MG/1
20 CAPSULE, DELAYED RELEASE ORAL DAILY
Qty: 30 CAPSULE | Refills: 1 | Status: SHIPPED | OUTPATIENT
Start: 2021-01-25 | End: 2021-06-16 | Stop reason: HOSPADM

## 2021-01-25 RX ORDER — ALUMINA, MAGNESIA, AND SIMETHICONE 2400; 2400; 240 MG/30ML; MG/30ML; MG/30ML
15 SUSPENSION ORAL ONCE
Status: COMPLETED | OUTPATIENT
Start: 2021-01-25 | End: 2021-01-25

## 2021-01-25 RX ORDER — LIDOCAINE HYDROCHLORIDE 20 MG/ML
15 SOLUTION OROPHARYNGEAL ONCE
Status: COMPLETED | OUTPATIENT
Start: 2021-01-25 | End: 2021-01-25

## 2021-01-25 RX ORDER — SUCRALFATE 1 G/1
1 TABLET ORAL 4 TIMES DAILY
Qty: 60 TABLET | Refills: 0 | Status: SHIPPED | OUTPATIENT
Start: 2021-01-25 | End: 2021-03-26

## 2021-01-25 RX ORDER — PANTOPRAZOLE SODIUM 40 MG/10ML
80 INJECTION, POWDER, LYOPHILIZED, FOR SOLUTION INTRAVENOUS ONCE
Status: COMPLETED | OUTPATIENT
Start: 2021-01-25 | End: 2021-01-25

## 2021-01-25 RX ORDER — SODIUM CHLORIDE 0.9 % (FLUSH) 0.9 %
10 SYRINGE (ML) INJECTION AS NEEDED
Status: DISCONTINUED | OUTPATIENT
Start: 2021-01-25 | End: 2021-01-25 | Stop reason: HOSPADM

## 2021-01-25 RX ADMIN — LIDOCAINE HYDROCHLORIDE 15 ML: 20 SOLUTION ORAL; TOPICAL at 12:47

## 2021-01-25 RX ADMIN — ALUMINUM HYDROXIDE, MAGNESIUM HYDROXIDE, AND DIMETHICONE 15 ML: 400; 400; 40 SUSPENSION ORAL at 12:47

## 2021-01-25 RX ADMIN — SODIUM CHLORIDE 1000 ML: 9 INJECTION, SOLUTION INTRAVENOUS at 12:15

## 2021-01-25 RX ADMIN — ONDANSETRON HYDROCHLORIDE 8 MG: 2 INJECTION, SOLUTION INTRAMUSCULAR; INTRAVENOUS at 12:18

## 2021-01-25 RX ADMIN — PANTOPRAZOLE SODIUM 80 MG: 40 INJECTION, POWDER, FOR SOLUTION INTRAVENOUS at 12:19

## 2021-01-25 RX ADMIN — IOPAMIDOL 100 ML: 755 INJECTION, SOLUTION INTRAVENOUS at 13:09

## 2021-01-25 NOTE — ED NOTES
"Upon arrival to triage the pt's chief complaint was of back pain x 1 month and right shoulder \"inflammation\". Per the pt she has seen a provider previously and was diagnosed with the shoulder \"inflammation\". After sitting at the triage desk the pt reports to this nurse that she has had burning in her throat and answered \"yes\" when asked if it was acid reflux. Pt then reports that she began vomiting this morning. Pt began actively vomiting while at the triage desk. Pt given an emesis bag and taken to room 8. Pt was ambulatory without assistance at this time.      Viji Song, RN  01/25/21 1120    "

## 2021-01-25 NOTE — ED NOTES
Will wait until zofran has time to be effective before administering PO meds     Navya Vásquez, RN  01/25/21 0113

## 2021-01-25 NOTE — ED NOTES
PT stated she has this problem of vomiting a lot and was seen at [facility] for same. She said they told her it was stress and gave her Maalox.  She said she is not under stress and that the maalox helps sometimes but did not help today.     Navya Vásquez, RN  01/25/21 [id number]

## 2021-01-25 NOTE — ED PROVIDER NOTES
Subjective   History of Present Illness  History of Present Illness    · Chief complaint: Abdominal pain and acid reflux    · Location: Epigastric abdominal pain with acid refluxing up substernally.  The pain also radiates to the back.    · Quality/Severity: The pain is very severe.    · Timing/Onset: The pain has been present for 3 months.    · Modifying Factors: She has been seen at the [facility] instructed to take Mylanta which only transiently helps.    · Associated symptoms: She has nausea and vomiting.  Denies any diarrhea or constipation.    · Narrative: The patient is a 40-year-old  female complaining of a 3-month history of epigastric abdominal pain that radiates up substernally into her back.  She reports refluxing a lot of acid.  She has been seen at the [place] [facility] a couple times and instructed to take Mylanta.  She had a normal gallbladder ultrasound October 10, 2020 here.    Review of Systems   Constitutional: Negative for activity change, appetite change, chills and fever.   HENT: Negative for congestion, ear pain, sinus pain, sore throat and voice change.    Eyes: Negative for pain and visual disturbance.   Respiratory: Negative for cough and shortness of breath.    Cardiovascular: Positive for chest pain ( Substernal burning).   Gastrointestinal: Positive for abdominal pain, nausea and vomiting. Negative for blood in stool and constipation.        Acid reflux.   Genitourinary: Negative for dysuria, flank pain, frequency, pelvic pain and urgency.   Musculoskeletal: Negative for back pain, neck pain and neck stiffness.   Skin: Negative for color change and rash.   Neurological: Negative for headaches.   Psychiatric/Behavioral: Negative for confusion.     Past Medical History:   Diagnosis Date   • Abnormal Pap smear of cervix 04/2018    ASCUS + HPV =- no follow up   • Cervical dysplasia     RUMA 2 on bx, neg LEEP   • S/P LEEP 9/29/2020     /78   Pulse 119   Temp 98.3 °F (36.8 °C)  "(Oral)   Resp 18   Ht 172.7 cm (68\")   Wt 68 kg (150 lb)   SpO2 100%   BMI 22.81 kg/m²     Past Medical History:   Diagnosis Date   • Abnormal Pap smear of cervix 2018    ASCUS + HPV =- no follow up   • Cervical dysplasia     RUMA 2 on bx, neg LEEP   • S/P LEEP 2020       No Known Allergies    Past Surgical History:   Procedure Laterality Date   • CERVICAL BIOPSY  W/ LOOP ELECTRODE EXCISION     •  SECTION      placenta previa   •  SECTION     • LEEP N/A 2020    Procedure: LOOP ELECTROCAUTERY EXCISION PROCEDURE;  Surgeon: Sarah Berger MD;  Location: [facility];  Service: Obstetrics/Gynecology;  Laterality: N/A;       Family History   Problem Relation Age of Onset   • Breast cancer Neg Hx    • Ovarian cancer Neg Hx    • Uterine cancer Neg Hx    • Colon cancer Neg Hx    • Prostate cancer Neg Hx    • Deep vein thrombosis Neg Hx        Social History     Socioeconomic History   • Marital status: Single     Spouse name: Not on file   • Number of children: 2   • Years of education: Not on file   • Highest education level: Not on file   Occupational History   • Occupation: unemployed   Tobacco Use   • Smoking status: Never Smoker   • Smokeless tobacco: Never Used   Substance and Sexual Activity   • Alcohol use: No   • Drug use: No   • Sexual activity: Yes     Partners: Male     Birth control/protection: Condom           Objective   Physical Exam  Vitals signs and nursing note reviewed.   Constitutional:       General: She is in acute distress.      Appearance: She is well-developed. She is not ill-appearing, toxic-appearing or diaphoretic.      Comments: The patient appears in discomfort.  She does not appear ill.  Review of her vital signs: She is afebrile with a temperature 90.3, respirations normal 18, tachycardic with a heart rate of 119, blood pressure low normal 101/66.  Her blood pressures consistent with previous visits numbers.   HENT:      Head: Normocephalic " and atraumatic.      Nose: Nose normal.      Mouth/Throat:      Pharynx: No oropharyngeal exudate.   Eyes:      General: No scleral icterus.        Right eye: No discharge.         Left eye: No discharge.      Pupils: Pupils are equal, round, and reactive to light.   Neck:      Musculoskeletal: Normal range of motion and neck supple.      Thyroid: No thyromegaly.      Vascular: No JVD.   Cardiovascular:      Rate and Rhythm: Normal rate and regular rhythm.      Heart sounds: Normal heart sounds. No murmur.   Pulmonary:      Effort: Pulmonary effort is normal.      Breath sounds: Normal breath sounds. No wheezing or rales.   Chest:      Chest wall: No tenderness.   Abdominal:      General: Bowel sounds are normal. There is no distension.      Palpations: Abdomen is soft.      Tenderness: There is no abdominal tenderness. There is no right CVA tenderness or left CVA tenderness.   Musculoskeletal: Normal range of motion.         General: No tenderness or deformity.   Lymphadenopathy:      Cervical: No cervical adenopathy.   Skin:     General: Skin is warm and dry.      Findings: No rash.   Neurological:      Mental Status: She is alert and oriented to person, place, and time.      Cranial Nerves: No cranial nerve deficit.      Coordination: Coordination normal.      Comments: No focal motor sensory deficit   Psychiatric:         Behavior: Behavior normal.         Thought Content: Thought content normal.         Judgment: Judgment normal.         Procedures           ED Course  ED Course as of Jan 25 1500 Mon Jan 25, 2021   1342 Review the patient's test results: Her CT of the abdomen and pelvis with IV contrast showed no a normal gallbladder and bile ducts, normal pancreas.  The CT showed questionable mild rugal fold thickening which could represent mild gastritis.  Her CBC had a slightly elevated White count of 11 with a slight left shift.  Hemoglobin, hematocrit and platelets were normal.  CMP had normal  electrolytes, normal renal and liver function test.  Lipase was normal.  Beta hCG negative.  Urinalysis had trace microscopic hematuria, but was negative for infection.    [TP]   6797 The patient was administered Protonix 80 mg, as Zofran 8 mg, and a liter of normal saline IV bolus.  Once her nausea was improved she was administered a GI cocktail.  The patient reported significant relief of her pain on repeat examination.  Is my impression the etiology of her pain is due to gastritis and GERD.  She will be discharged with prescriptions for Prilosec, Carafate and Zofran.  She is instructed to make an appointment to follow-up with Dr. Tubbs, gastroenterology.  She was also instructed to follow-up with a PCP assigned to her by her insurance carrier.    [TP]      ED Course User Index  [TP] Melecio Flores MD                                           MDM  Number of Diagnoses or Management Options  Acute gastritis without hemorrhage, unspecified gastritis type: established and worsening  Gastroesophageal reflux disease with esophagitis without hemorrhage: established and worsening     Amount and/or Complexity of Data Reviewed  Clinical lab tests: ordered and reviewed  Tests in the radiology section of CPT®: ordered and reviewed  Review and summarize past medical records: yes    Risk of Complications, Morbidity, and/or Mortality  Presenting problems: high  Diagnostic procedures: high  Management options: high  General comments: My differential diagnosis for abdominal pain includes but is not limited to:  Gastritis, gastroenteritis, peptic ulcer disease, GERD, esophageal perforation, acute appendicitis, mesenteric adenitis, Meckel’s diverticulum, epiploic appendagitis, diverticulitis, colon cancer, ulcerative colitis, Crohn’s disease, intussusception, small bowel obstruction, adhesions, ischemic bowel, perforated viscus, ileus, obstipation, biliary colic, cholecystitis, cholelithiasis, Sudhakar-Michael Phuc, hepatitis,  pancreatitis, common bile duct obstruction, cholangitis, bile leak, splenic trauma, splenic rupture, splenic infarction, splenic abscess, abdominal abscess, ascites, spontaneous bacterial peritonitis, hernia, UTI, cystitis, prostatitis, ureterolithiasis, urinary obstruction, ovarian cyst, torsion, pregnancy, ectopic pregnancy, PID, pelvic abscess, mittelschmerz, endometriosis, AAA, myocardial infarction, pneumonia, cancer, porphyria, DKA, medications, sickle cell, viral syndrome, zoster    Patient Progress  Patient progress: stable      Final diagnoses:   Acute gastritis without hemorrhage, unspecified gastritis type   Gastroesophageal reflux disease with esophagitis without hemorrhage           Labs Reviewed   COMPREHENSIVE METABOLIC PANEL - Abnormal; Notable for the following components:       Result Value    CO2 29.7 (*)     All other components within normal limits    Narrative:     GFR Normal >60  Chronic Kidney Disease <60  Kidney Failure <15     URINALYSIS W/ MICROSCOPIC IF INDICATED (NO CULTURE) - Abnormal; Notable for the following components:    Blood, UA Trace (*)     All other components within normal limits   CBC WITH AUTO DIFFERENTIAL - Abnormal; Notable for the following components:    WBC 10.99 (*)     MCV 97.6 (*)     MCHC 31.1 (*)     Lymphocyte % 19.2 (*)     Neutrophils, Absolute 8.04 (*)     All other components within normal limits   URINALYSIS, MICROSCOPIC ONLY - Abnormal; Notable for the following components:    RBC, UA 3-5 (*)     Bacteria, UA 1+ (*)     Squamous Epithelial Cells, UA 7-12 (*)     All other components within normal limits   LIPASE - Normal   HCG, SERUM, QUALITATIVE - Normal   CBC AND DIFFERENTIAL    Narrative:     The following orders were created for panel order CBC & Differential.  Procedure                               Abnormality         Status                     ---------                               -----------         ------                     CBC Auto  Differential[[id number]]        Abnormal            Final result                 Please view results for these tests on the individual orders.     CT Abdomen Pelvis With Contrast   Final Result   The stomach is not opacified and is not well distended but question of mild rugal fold thickening is raised which could represent mild gastritis. CT of the abdomen and pelvis is otherwise negative               Signer Name: Myra Boyd MD    Signed: 1/25/2021 1:28 PM    Workstation Name: [id number]     [facility]             Medication List      New Prescriptions    omeprazole 20 MG capsule  Commonly known as: PrilOSEC  Take 1 capsule by mouth Daily.     ondansetron ODT 8 MG disintegrating tablet  Commonly known as: ZOFRAN-ODT  One tablet po q 6 hours PRN nausea and vomiting     sucralfate 1 g tablet  Commonly known as: Carafate  Take 1 tablet by mouth 4 (Four) Times a Day for 60 days.        Stop    ibuprofen 600 MG tablet  Commonly known as: ADVIL,MOTRIN           Where to Get Your Medications      These medications were sent to [facility] [address] [place] [address] - [address] [facility] - [phone] [facility] [phone]   [address] [facility] [address]    Phone: [phone]   · omeprazole 20 MG capsule  · ondansetron ODT 8 MG disintegrating tablet  · sucralfate 1 g tablet              Melecio Flores MD  01/25/21 0050

## 2021-03-03 ENCOUNTER — OFFICE VISIT (OUTPATIENT)
Dept: GASTROENTEROLOGY | Facility: CLINIC | Age: 41
End: 2021-03-03

## 2021-03-03 VITALS — TEMPERATURE: 98.2 F | BODY MASS INDEX: 24.04 KG/M2 | HEIGHT: 68 IN | WEIGHT: 158.6 LBS

## 2021-03-03 DIAGNOSIS — R10.13 EPIGASTRIC PAIN: Primary | ICD-10-CM

## 2021-03-03 PROCEDURE — 99204 OFFICE O/P NEW MOD 45 MIN: CPT | Performed by: INTERNAL MEDICINE

## 2021-03-03 NOTE — PROGRESS NOTES
"    PATIENT INFORMATION  Marilee Stapleton       - 1980    CHIEF COMPLAINT  Chief Complaint   Patient presents with   • Heartburn       HISTORY OF PRESENT ILLNESS  A phone  was used for the interview and scheduling her EGD    No previous Gi evaluation.    One year of symptoms bloating and right pain. Worse with food and even when empty she \"feels the Acid\" Usinf milk as an antacid    Has now pantoprazole from [facility] and a Probiotic. Most of her symptroms are pressure/bloating. Sour water brash.  This occurs 5 min after meals , is able to finish and hasnt lost weight.    No previous testing or treatment for H. Pylori. Has Naproxen and Ibuprofen btu doesn't take because they bother her Stomach    Goes on to say when she Is on Pantoprazole for a weeek she will feel fine then she stops and it recurs-\"doesn't want to depend on it\"      REVIEWED PERTINENT RESULTS/ LABS  Lab Results   Component Value Date    CASEREPORT  2020     Surgical Pathology Report                         Case: [id number]                                  Authorizing Provider:  Sarah Berger,  Collected:           2020 11:15 AM                                 MD                                                                           Ordering Location:     [facility]   Received:            2020 12:04 PM                                 OR                                                                           Pathologist:           Pratima Oleary MD                                                          Specimens:   1) - Cervix, anterior lip, inked at 12 oclock                                                       2) - Cervix, posterior lip, inked at 6 oclock                                                       3) - Endocervix, endocervical curettings                                                   FINALDX  2020     1. Cervix, Anterior Lip, LEEP:   A. Squamocolumnar mucosa with " focal acute inflammation.   B. Negative for dysplasia.    2. Cervix, Posterior Lip, LEEP:   A. Squamocolumnar mucosa with focal acute inflammation.   B. Negative for dysplasia.     3. Endocervix, Curettings:   A. Fragments of benign endocervix with squamous metaplasia and acute inflammation.    jab/pkm        Lab Results   Component Value Date    HGB 14.1 2021    MCV 97.6 (H) 2021     2021    ALT 19 2021    AST 15 2021      No results found.    REVIEW OF SYSTEMS  Review of Systems   Gastrointestinal:        Gerd   All other systems reviewed and are negative.        ACTIVE PROBLEMS  Patient Active Problem List    Diagnosis   • S/P LEEP [Z98.890]   • Dysplasia of cervix, high grade RUMA 2 [N87.1]   • Breast pain [N64.4]   • ASCUS with positive high risk HPV cervical [R87.610, R87.810]         PAST MEDICAL HISTORY  Past Medical History:   Diagnosis Date   • Abnormal Pap smear of cervix 2018    ASCUS + HPV =- no follow up   • Cervical dysplasia     RUMA 2 on bx, neg LEEP   • S/P LEEP 2020         SURGICAL HISTORY  Past Surgical History:   Procedure Laterality Date   • CERVICAL BIOPSY  W/ LOOP ELECTRODE EXCISION     •  SECTION      placenta previa   •  SECTION     • LEEP N/A 2020    Procedure: LOOP ELECTROCAUTERY EXCISION PROCEDURE;  Surgeon: Sarah Berger MD;  Location: [facility];  Service: Obstetrics/Gynecology;  Laterality: N/A;         FAMILY HISTORY  Family History   Problem Relation Age of Onset   • Breast cancer Neg Hx    • Ovarian cancer Neg Hx    • Uterine cancer Neg Hx    • Colon cancer Neg Hx    • Prostate cancer Neg Hx    • Deep vein thrombosis Neg Hx          SOCIAL HISTORY  Social History     Occupational History   • Occupation: unemployed   Tobacco Use   • Smoking status: Never Smoker   • Smokeless tobacco: Never Used   Substance and Sexual Activity   • Alcohol use: No   • Drug use: No   • Sexual activity: Yes     Partners:  "Male     Birth control/protection: Condom         CURRENT MEDICATIONS    Current Outpatient Medications:   •  Calcium Carbonate-Simethicone (MAALOX MAX PO), Take  by mouth., Disp: , Rfl:   •  omeprazole (PrilOSEC) 20 MG capsule, Take 1 capsule by mouth Daily., Disp: 30 capsule, Rfl: 1  •  ondansetron ODT (ZOFRAN-ODT) 8 MG disintegrating tablet, One tablet po q 6 hours PRN nausea and vomiting, Disp: 10 tablet, Rfl: 0  •  sucralfate (Carafate) 1 g tablet, Take 1 tablet by mouth 4 (Four) Times a Day for 60 days., Disp: 60 tablet, Rfl: 0  •  vitamin E 100 UNIT capsule, Take 100 Units by mouth Daily., Disp: , Rfl:     ALLERGIES  Patient has no known allergies.    VITALS  Vitals:    03/03/21 1113   Temp: 98.2 °F (36.8 °C)   TempSrc: Temporal   Weight: 71.9 kg (158 lb 9.6 oz)   Height: 172.7 cm (67.99\")       PHYSICAL EXAM  Debilities/Disabilities Identified: None  Emotional Behavior: Appropriate  Wt Readings from Last 3 Encounters:   03/03/21 71.9 kg (158 lb 9.6 oz)   01/25/21 68 kg (150 lb)   10/12/20 69 kg (152 lb 1.6 oz)     Ht Readings from Last 1 Encounters:   03/03/21 172.7 cm (67.99\")     Body mass index is 24.12 kg/m².  Physical Exam  Constitutional:       Appearance: She is well-developed. She is not diaphoretic.   HENT:      Head: Normocephalic and atraumatic.   Eyes:      General: No scleral icterus.     Conjunctiva/sclera: Conjunctivae normal.      Pupils: Pupils are equal, round, and reactive to light.   Neck:      Musculoskeletal: Normal range of motion and neck supple.      Thyroid: No thyromegaly.   Cardiovascular:      Rate and Rhythm: Normal rate and regular rhythm.      Heart sounds: Normal heart sounds. No murmur. No gallop.    Pulmonary:      Effort: Pulmonary effort is normal.      Breath sounds: Normal breath sounds. No wheezing or rales.   Abdominal:      General: Bowel sounds are normal. There is no distension or abdominal bruit.      Palpations: Abdomen is soft. There is no shifting dullness, " fluid wave or mass.      Tenderness: There is abdominal tenderness in the right upper quadrant and epigastric area. There is no guarding. Negative signs include Farmer's sign.      Hernia: There is no hernia in the ventral area.   Musculoskeletal: Normal range of motion.   Lymphadenopathy:      Cervical: No cervical adenopathy.   Skin:     General: Skin is warm and dry.      Findings: No erythema or rash.   Neurological:      Mental Status: She is alert and oriented to person, place, and time.   Psychiatric:         Mood and Affect: Mood normal.         Behavior: Behavior normal.         CLINICAL DATA REVIEWED   reviewed previous lab results and integrated with today's visit, reviewed notes from other physicians and/or last GI encounter, reviewed previous endoscopy results and available photos, reviewed surgical pathology results from previous biopsies    ASSESSMENT  Diagnoses and all orders for this visit:    Epigastric pain  -     Case Request; Standing  -     Case Request    Other orders  -     Follow Anesthesia Guidelines / Protocol; Future  -     Obtain Informed Consent; Standing          PLAN  Will continue her PPI daily until her EGD can be scheduled, reviewed her home meds and she has sucralfate and omeprazole and pantoprazole as well as Relafen...  Return if symptoms worsen or fail to improve.    I have discussed the above plan with the patient.  They verbalize understanding and are in agreement with the plan.  They have been advised to contact the office for any questions, concerns, or changes related to their health.

## 2021-03-08 PROBLEM — R10.13 EPIGASTRIC PAIN: Status: ACTIVE | Noted: 2021-03-08

## 2021-04-22 ENCOUNTER — OFFICE VISIT (OUTPATIENT)
Dept: OBSTETRICS AND GYNECOLOGY | Facility: CLINIC | Age: 41
End: 2021-04-22

## 2021-04-22 VITALS
WEIGHT: 159.4 LBS | SYSTOLIC BLOOD PRESSURE: 110 MMHG | HEIGHT: 68 IN | DIASTOLIC BLOOD PRESSURE: 72 MMHG | BODY MASS INDEX: 24.16 KG/M2

## 2021-04-22 DIAGNOSIS — Z87.42 HISTORY OF ABNORMAL CERVICAL PAP SMEAR: ICD-10-CM

## 2021-04-22 DIAGNOSIS — Z01.419 PAP SMEAR, LOW-RISK: ICD-10-CM

## 2021-04-22 DIAGNOSIS — N89.8 VAGINAL DISCHARGE: Primary | ICD-10-CM

## 2021-04-22 DIAGNOSIS — Z11.51 ENCOUNTER FOR SCREENING FOR HUMAN PAPILLOMAVIRUS (HPV): ICD-10-CM

## 2021-04-22 LAB
B-HCG UR QL: NEGATIVE
BILIRUB BLD-MCNC: NEGATIVE MG/DL
CLARITY, POC: CLEAR
COLOR UR: YELLOW
GLUCOSE UR STRIP-MCNC: NEGATIVE MG/DL
INTERNAL NEGATIVE CONTROL: NEGATIVE
INTERNAL POSITIVE CONTROL: POSITIVE
KETONES UR QL: NEGATIVE
LEUKOCYTE EST, POC: NEGATIVE
Lab: 55
NITRITE UR-MCNC: NEGATIVE MG/ML
PH UR: 5 [PH] (ref 5–8)
PROT UR STRIP-MCNC: NEGATIVE MG/DL
RBC # UR STRIP: NEGATIVE /UL
SP GR UR: 1 (ref 1–1.03)
UROBILINOGEN UR QL: NORMAL

## 2021-04-22 PROCEDURE — 81002 URINALYSIS NONAUTO W/O SCOPE: CPT | Performed by: OBSTETRICS & GYNECOLOGY

## 2021-04-22 PROCEDURE — 81025 URINE PREGNANCY TEST: CPT | Performed by: OBSTETRICS & GYNECOLOGY

## 2021-04-22 PROCEDURE — 99213 OFFICE O/P EST LOW 20 MIN: CPT | Performed by: OBSTETRICS & GYNECOLOGY

## 2021-04-22 RX ORDER — ERGOCALCIFEROL 1.25 MG/1
50000 CAPSULE ORAL WEEKLY
COMMUNITY
Start: 2021-03-18 | End: 2021-08-16

## 2021-04-22 RX ORDER — PANTOPRAZOLE SODIUM 40 MG/1
40 TABLET, DELAYED RELEASE ORAL DAILY
Status: ON HOLD | COMMUNITY
Start: 2021-03-18 | End: 2021-06-16 | Stop reason: SDUPTHER

## 2021-04-26 LAB
CYTOLOGIST CVX/VAG CYTO: ABNORMAL
CYTOLOGY CVX/VAG DOC CYTO: ABNORMAL
CYTOLOGY CVX/VAG DOC THIN PREP: ABNORMAL
DX ICD CODE: ABNORMAL
DX ICD CODE: ABNORMAL
HIV 1 & 2 AB SER-IMP: ABNORMAL
HPV I/H RISK 4 DNA CVX QL PROBE+SIG AMP: POSITIVE
OTHER STN SPEC: ABNORMAL
PATHOLOGIST CVX/VAG CYTO: ABNORMAL
STAT OF ADQ CVX/VAG CYTO-IMP: ABNORMAL

## 2021-04-27 LAB
A VAGINAE DNA VAG QL NAA+PROBE: NORMAL SCORE
BVAB2 DNA VAG QL NAA+PROBE: NORMAL SCORE
C ALBICANS DNA VAG QL NAA+PROBE: NEGATIVE
C GLABRATA DNA VAG QL NAA+PROBE: NEGATIVE
C TRACH DNA VAG QL NAA+PROBE: NEGATIVE
M GENITALIUM DNA SPEC QL NAA+PROBE: NEGATIVE
M HOMINIS DNA SPEC QL NAA+PROBE: NEGATIVE
MEGA1 DNA VAG QL NAA+PROBE: NORMAL SCORE
N GONORRHOEA DNA VAG QL NAA+PROBE: NEGATIVE
T VAGINALIS DNA VAG QL NAA+PROBE: NEGATIVE
UREAPLASMA DNA SPEC QL NAA+PROBE: POSITIVE

## 2021-04-28 RX ORDER — AZITHROMYCIN 500 MG/1
1000 TABLET, FILM COATED ORAL DAILY
Qty: 2 TABLET | Refills: 0 | Status: SHIPPED | OUTPATIENT
Start: 2021-04-28 | End: 2021-04-29

## 2021-05-20 ENCOUNTER — OFFICE VISIT (OUTPATIENT)
Dept: OBSTETRICS AND GYNECOLOGY | Facility: CLINIC | Age: 41
End: 2021-05-20

## 2021-05-20 VITALS
HEIGHT: 68 IN | BODY MASS INDEX: 24.55 KG/M2 | WEIGHT: 162 LBS | SYSTOLIC BLOOD PRESSURE: 120 MMHG | DIASTOLIC BLOOD PRESSURE: 74 MMHG

## 2021-05-20 DIAGNOSIS — Z13.9 SCREENING FOR CONDITION: ICD-10-CM

## 2021-05-20 DIAGNOSIS — Z01.419 PAP SMEAR, LOW-RISK: ICD-10-CM

## 2021-05-20 DIAGNOSIS — R87.612 LOW GRADE SQUAMOUS INTRAEPITHELIAL LESION ON CYTOLOGIC SMEAR OF CERVIX (LGSIL): Primary | ICD-10-CM

## 2021-05-20 DIAGNOSIS — Z98.890 HISTORY OF COLPOSCOPY: ICD-10-CM

## 2021-05-20 DIAGNOSIS — Z87.42 HISTORY OF ABNORMAL CERVICAL PAP SMEAR: ICD-10-CM

## 2021-05-20 PROCEDURE — 57454 BX/CURETT OF CERVIX W/SCOPE: CPT | Performed by: OBSTETRICS & GYNECOLOGY

## 2021-05-20 PROCEDURE — 81002 URINALYSIS NONAUTO W/O SCOPE: CPT | Performed by: OBSTETRICS & GYNECOLOGY

## 2021-05-20 PROCEDURE — 81025 URINE PREGNANCY TEST: CPT | Performed by: OBSTETRICS & GYNECOLOGY

## 2021-05-20 RX ORDER — FLUTICASONE PROPIONATE 50 MCG
SPRAY, SUSPENSION (ML) NASAL
COMMUNITY
Start: 2021-05-10 | End: 2021-08-16

## 2021-05-20 RX ORDER — FLUCONAZOLE 150 MG/1
TABLET ORAL
COMMUNITY
Start: 2021-05-10 | End: 2021-08-16

## 2021-05-20 RX ORDER — CETIRIZINE HYDROCHLORIDE 10 MG/1
10 TABLET ORAL DAILY
COMMUNITY
Start: 2021-05-10 | End: 2021-08-16

## 2021-05-20 NOTE — PROGRESS NOTES
"      Marilee Stapleton is a 41 y.o. patient who presents for follow up of   Chief Complaint   Patient presents with   • Procedure     COLPO     42 yo est pt here for colpo. She had an ASCUS + HPV pap and RUMA 2 on colpo bx and had a LEEP in 9/2020 that was negative for dysplasia. Her ECC was also negative. Her first repeat pap in 4/2021 was LGSIL + HPV. She had a reaction to Amoxicillin. Her vaginal discharge has resolved.      The following portions of the patient's history were reviewed and updated as appropriate: allergies, current medications and problem list.    Review of Systems   Constitutional: Positive for activity change.   Genitourinary: Negative for vaginal bleeding, vaginal discharge and vaginal pain.   All other systems reviewed and are negative.      /74   Ht 172.7 cm (67.99\")   Wt 73.5 kg (162 lb)   LMP 04/23/2021   Breastfeeding No   BMI 24.64 kg/m²     Physical Exam  Vitals and nursing note reviewed. Exam conducted with a chaperone present.   Constitutional:       Appearance: Normal appearance. She is well-developed and normal weight.   HENT:      Head: Normocephalic and atraumatic.   Eyes:      General: No scleral icterus.     Conjunctiva/sclera: Conjunctivae normal.   Neck:      Thyroid: No thyromegaly.   Abdominal:      General: There is no distension.      Palpations: Abdomen is soft. There is no mass.      Tenderness: There is no abdominal tenderness. There is no guarding or rebound.      Hernia: No hernia is present.   Genitourinary:     General: Normal vulva.      Vagina: Normal.      Uterus: Normal.       Adnexa: Right adnexa normal and left adnexa normal.         Skin:     General: Skin is warm and dry.   Neurological:      Mental Status: She is alert and oriented to person, place, and time.   Psychiatric:         Mood and Affect: Mood normal.         Behavior: Behavior normal.         Thought Content: Thought content normal.         Judgment: Judgment normal.       Colposcopy " Procedure Note    Procedures          Indications: Most recent Pap smear showed: low-grade squamous intraepithelial neoplasia (LGSIL - encompassing HPV,mild dysplasia,RUMA I)    Prior cervical/vaginal disease: RUMA 2  Prior cervical treatment: LLETZ.  Contraception: none    Procedure Details   The risks and benefits of the procedure and Verbal informed consent obtained.  Pregnancy test: negative    Speculum placed in vagina and excellent visualization of cervix achieved, cervix swabbed x 3 with acetic acid solution and Lugol's solution     Findings:  Cervix: no mosaicism, no punctation and acetowhite lesion(s) noted at 12 & 7 o'clock; cervix swabbed with Lugol's solution, endocervical curettage performed, cervical biopsies taken at 12 & 7  o'clock, specimen labelled and sent to pathology and hemostasis achieved with Monsel's solution.  Vaginal inspection: normal without visible lesions.  Vulvar colposcopy: vulvar colposcopy not performed.  Colpo adequacy: was adquate    Specimens: 12, 7 and ECC    Colpo impression: RUMA 1    Complications: none.   Patient tolerated procedure well.     Smoking Status: No                    A/P:  1. LGSIL + HPV after negative LEEP- Specimens labelled and sent to Pathology.  Will base further treatment on Pathology findings.  Post biopsy instructions given to patient.  2. [facility] access hospital 7/2021      Assessment/Plan   Diagnoses and all orders for this visit:    1. Low grade squamous intraepithelial lesion on cytologic smear of cervix (LGSIL) (Primary)  -     Reference Histopathology    2. Pap smear, low-risk    3. History of colposcopy  -     POC Urinalysis Dipstick  -     POC Pregnancy, Urine    4. History of abnormal cervical Pap smear  -     Reference Histopathology    5. Screening for condition  -     Mammo Screening Bilateral With CAD; Future                 No follow-ups on file.      Sarah Berger MD    5/20/2021  20:07 EDT

## 2021-05-25 ENCOUNTER — TRANSCRIBE ORDERS (OUTPATIENT)
Dept: ADMINISTRATIVE | Facility: HOSPITAL | Age: 41
End: 2021-05-25

## 2021-05-25 DIAGNOSIS — U07.1 COVID-19: Primary | ICD-10-CM

## 2021-05-25 DIAGNOSIS — Z12.31 VISIT FOR SCREENING MAMMOGRAM: Primary | ICD-10-CM

## 2021-06-14 ENCOUNTER — LAB (OUTPATIENT)
Dept: LAB | Facility: HOSPITAL | Age: 41
End: 2021-06-14

## 2021-06-14 DIAGNOSIS — U07.1 COVID-19: ICD-10-CM

## 2021-06-14 LAB — SARS-COV-2 RNA PNL SPEC NAA+PROBE: NOT DETECTED

## 2021-06-14 PROCEDURE — C9803 HOPD COVID-19 SPEC COLLECT: HCPCS

## 2021-06-14 PROCEDURE — 87635 SARS-COV-2 COVID-19 AMP PRB: CPT | Performed by: OBSTETRICS & GYNECOLOGY

## 2021-06-15 ENCOUNTER — ANESTHESIA EVENT (OUTPATIENT)
Dept: PERIOP | Facility: HOSPITAL | Age: 41
End: 2021-06-15

## 2021-06-16 ENCOUNTER — ANESTHESIA (OUTPATIENT)
Dept: PERIOP | Facility: HOSPITAL | Age: 41
End: 2021-06-16

## 2021-06-16 ENCOUNTER — HOSPITAL ENCOUNTER (OUTPATIENT)
Facility: HOSPITAL | Age: 41
Setting detail: HOSPITAL OUTPATIENT SURGERY
Discharge: HOME | End: 2021-06-16
Attending: INTERNAL MEDICINE | Admitting: INTERNAL MEDICINE

## 2021-06-16 VITALS
OXYGEN SATURATION: 98 % | WEIGHT: 156.8 LBS | SYSTOLIC BLOOD PRESSURE: 99 MMHG | BODY MASS INDEX: 23.76 KG/M2 | TEMPERATURE: 98 F | DIASTOLIC BLOOD PRESSURE: 58 MMHG | HEART RATE: 76 BPM | RESPIRATION RATE: 16 BRPM | HEIGHT: 68 IN

## 2021-06-16 DIAGNOSIS — R10.13 EPIGASTRIC PAIN: ICD-10-CM

## 2021-06-16 PROCEDURE — 43239 EGD BIOPSY SINGLE/MULTIPLE: CPT | Performed by: INTERNAL MEDICINE

## 2021-06-16 PROCEDURE — 88305 TISSUE EXAM BY PATHOLOGIST: CPT | Performed by: INTERNAL MEDICINE

## 2021-06-16 PROCEDURE — 25010000002 PROPOFOL 10 MG/ML EMULSION: Performed by: NURSE ANESTHETIST, CERTIFIED REGISTERED

## 2021-06-16 PROCEDURE — 88313 SPECIAL STAINS GROUP 2: CPT | Performed by: INTERNAL MEDICINE

## 2021-06-16 PROCEDURE — 25010000003 LIDOCAINE 1 % SOLUTION: Performed by: NURSE ANESTHETIST, CERTIFIED REGISTERED

## 2021-06-16 RX ORDER — PROPOFOL 10 MG/ML
VIAL (ML) INTRAVENOUS CONTINUOUS PRN
Status: DISCONTINUED | OUTPATIENT
Start: 2021-06-16 | End: 2021-06-16 | Stop reason: SURG

## 2021-06-16 RX ORDER — SODIUM CHLORIDE 0.9 % (FLUSH) 0.9 %
10 SYRINGE (ML) INJECTION EVERY 12 HOURS SCHEDULED
Status: DISCONTINUED | OUTPATIENT
Start: 2021-06-16 | End: 2021-06-16 | Stop reason: HOSPADM

## 2021-06-16 RX ORDER — MAGNESIUM HYDROXIDE 1200 MG/15ML
LIQUID ORAL AS NEEDED
Status: DISCONTINUED | OUTPATIENT
Start: 2021-06-16 | End: 2021-06-16 | Stop reason: HOSPADM

## 2021-06-16 RX ORDER — LIDOCAINE HYDROCHLORIDE 10 MG/ML
INJECTION, SOLUTION INFILTRATION; PERINEURAL AS NEEDED
Status: DISCONTINUED | OUTPATIENT
Start: 2021-06-16 | End: 2021-06-16 | Stop reason: SURG

## 2021-06-16 RX ORDER — LIDOCAINE HYDROCHLORIDE 10 MG/ML
0.5 INJECTION, SOLUTION EPIDURAL; INFILTRATION; INTRACAUDAL; PERINEURAL ONCE AS NEEDED
Status: DISCONTINUED | OUTPATIENT
Start: 2021-06-16 | End: 2021-06-16 | Stop reason: HOSPADM

## 2021-06-16 RX ORDER — PROPOFOL 10 MG/ML
VIAL (ML) INTRAVENOUS AS NEEDED
Status: DISCONTINUED | OUTPATIENT
Start: 2021-06-16 | End: 2021-06-16 | Stop reason: SURG

## 2021-06-16 RX ORDER — GLYCOPYRROLATE 0.2 MG/ML
INJECTION INTRAMUSCULAR; INTRAVENOUS AS NEEDED
Status: DISCONTINUED | OUTPATIENT
Start: 2021-06-16 | End: 2021-06-16 | Stop reason: SURG

## 2021-06-16 RX ORDER — SODIUM CHLORIDE 0.9 % (FLUSH) 0.9 %
10 SYRINGE (ML) INJECTION AS NEEDED
Status: DISCONTINUED | OUTPATIENT
Start: 2021-06-16 | End: 2021-06-16 | Stop reason: HOSPADM

## 2021-06-16 RX ORDER — PANTOPRAZOLE SODIUM 40 MG/1
40 TABLET, DELAYED RELEASE ORAL DAILY
Qty: 90 TABLET | Refills: 3 | Status: SHIPPED | OUTPATIENT
Start: 2021-06-16 | End: 2021-08-16 | Stop reason: SDUPTHER

## 2021-06-16 RX ORDER — SODIUM CHLORIDE, SODIUM LACTATE, POTASSIUM CHLORIDE, CALCIUM CHLORIDE 600; 310; 30; 20 MG/100ML; MG/100ML; MG/100ML; MG/100ML
9 INJECTION, SOLUTION INTRAVENOUS CONTINUOUS PRN
Status: DISCONTINUED | OUTPATIENT
Start: 2021-06-16 | End: 2021-06-16 | Stop reason: HOSPADM

## 2021-06-16 RX ORDER — SODIUM CHLORIDE 9 MG/ML
40 INJECTION, SOLUTION INTRAVENOUS AS NEEDED
Status: DISCONTINUED | OUTPATIENT
Start: 2021-06-16 | End: 2021-06-16 | Stop reason: HOSPADM

## 2021-06-16 RX ADMIN — SODIUM CHLORIDE, POTASSIUM CHLORIDE, SODIUM LACTATE AND CALCIUM CHLORIDE: 600; 310; 30; 20 INJECTION, SOLUTION INTRAVENOUS at 12:26

## 2021-06-16 RX ADMIN — SODIUM CHLORIDE, POTASSIUM CHLORIDE, SODIUM LACTATE AND CALCIUM CHLORIDE 9 ML/HR: 600; 310; 30; 20 INJECTION, SOLUTION INTRAVENOUS at 11:37

## 2021-06-16 RX ADMIN — PROPOFOL 20 MG: 10 INJECTION, EMULSION INTRAVENOUS at 12:40

## 2021-06-16 RX ADMIN — PROPOFOL 50 MG: 10 INJECTION, EMULSION INTRAVENOUS at 12:34

## 2021-06-16 RX ADMIN — PROPOFOL 20 MG: 10 INJECTION, EMULSION INTRAVENOUS at 12:37

## 2021-06-16 RX ADMIN — GLYCOPYRROLATE 0.1 MG: 0.2 INJECTION INTRAMUSCULAR; INTRAVENOUS at 12:29

## 2021-06-16 RX ADMIN — LIDOCAINE HYDROCHLORIDE 50 MG: 10 INJECTION, SOLUTION INFILTRATION; PERINEURAL at 12:28

## 2021-06-16 RX ADMIN — PROPOFOL 50 MCG/KG/MIN: 10 INJECTION, EMULSION INTRAVENOUS at 12:31

## 2021-06-16 RX ADMIN — PROPOFOL 50 MG: 10 INJECTION, EMULSION INTRAVENOUS at 12:31

## 2021-06-16 NOTE — ANESTHESIA PREPROCEDURE EVALUATION
Anesthesia Evaluation     Patient summary reviewed and Nursing notes reviewed   no history of anesthetic complications:  NPO Solid Status: > 8 hours  NPO Liquid Status: > 8 hours           Airway   Mallampati: I  TM distance: >3 FB  Neck ROM: full  No difficulty expected  Dental - normal exam     Pulmonary - negative pulmonary ROS and normal exam   Cardiovascular - negative cardio ROS and normal exam    Rhythm: regular  Rate: normal        Neuro/Psych- negative ROS  GI/Hepatic/Renal/Endo    (+)  GERD poorly controlled,      Musculoskeletal (-) negative ROS    Abdominal  - normal exam   Substance History - negative use     OB/GYN          Other                        Anesthesia Plan    ASA 2     MAC     intravenous induction     Anesthetic plan, all risks, benefits, and alternatives have been provided, discussed and informed consent has been obtained with: patient.  Use of blood products discussed with patient  Consented to blood products.

## 2021-06-16 NOTE — OP NOTE
ESOPHAGOGASTRODUODENOSCOPY  Procedure Report    Patient Name:  Marilee Stapleton  YOB: 1980    Date of Surgery:  6/16/2021     Indications:  Epigastric pain [R10.13]      Pre-op Diagnosis:   Epigastric pain [R10.13]    Post-Op Diagnosis Codes:     * Epigastric pain [R10.13]     * Reflux esophagitis [K21.00]     * Gastritis [K29.70]         Procedure/CPT® Codes:      Procedure(s):  ESOPHAGOGASTRODUODENOSCOPY with biopsies    Staff:  Surgeon(s):  Lee Tubbs MD         Anesthesia: Monitored Anesthesia Care    Estimated Blood Loss: none    Specimens:   ID Type Source Tests Collected by Time   A (Not marked as sent) :  Tissue Gastric, Body TISSUE PATHOLOGY EXAM Lee Tubbs MD 6/16/2021 1238   B (Not marked as sent) :  Tissue Esophagus, Distal TISSUE PATHOLOGY EXAM Lee Tubbs MD 6/16/2021 1238       Implants:    Nothing was implanted during the procedure      Description of Procedure: After having signed informed consent by use of a  she was brought to the endoscopy suite, had a  present there as well.  She was given her IV sedation.  A bite block was placed between her incisors.  The scope was introduced into the oropharynx and advanced under direct visualization with ease into the esophagus, through to the distal esophagus.  The Z-line was mild to moderately irregular consistent with reflux esophagitis, suggestive of an erosion as well; no stricture, no active ulcer.  The scope was advanced into the stomach and retroflexed revealing normal cardia and fundus.  The scope was deretroflexed and advanced into the antrum.  There were scattered areas of erythema suggestive of a healing erosion present.  The scope was advanced through the pylorus to the duodenal bulb, which was examined and normal and around the angle to the 2nd and 3rd portions of the duodenum, which were normal as well.  The scope was withdrawn back into the  antrum.  Biopsies were taken to rule out H. pylori or partially treated H. pylori in a patient who had sought care elsewhere to get tested and treated since her exam in the office.  The scope was then withdrawn back to the distal esophagus and biopsies were taken to rule out short segment Davis esophagus.  The scope was withdrawn.  The remainder of the esophagus was normal appearing.  The scope was taken from the patient.  She tolerated the procedure very well.               Findings: Normal Duodenum  Mild gastritis-Biopsy  Reflux Esophagitis-Biopsy     Complications: None    Recommendations: Results to be called. Daily PPI and will call with Results, Follow up in Office      Lee Tubbs MD     Date: 6/16/2021  Time: 12:44 EDT

## 2021-06-16 NOTE — ANESTHESIA POSTPROCEDURE EVALUATION
Patient: Marilee Stapleton    Procedure Summary     Date: 06/16/21 Room / Location: [facility] ENDOSCOPY 1 /  [facility]    Anesthesia Start: 1226 Anesthesia Stop: 1243    Procedure: ESOPHAGOGASTRODUODENOSCOPY with biopsies (N/A Esophagus) Diagnosis:       Epigastric pain      Reflux esophagitis      Gastritis      (Epigastric pain [R10.13])    Surgeons: Lee Tubbs MD Provider: Deejay Pina CRNA    Anesthesia Type: MAC ASA Status: 2          Anesthesia Type: MAC    Vitals  Vitals Value Taken Time   BP 98/66 06/16/21 1300   Temp 98 °F (36.7 °C) 06/16/21 1248   Pulse 77 06/16/21 1300   Resp 16 06/16/21 1300   SpO2 97 % 06/16/21 1300           Post Anesthesia Care and Evaluation    Patient location during evaluation: PHASE II  Patient participation: complete - patient participated  Level of consciousness: awake  Pain score: 0  Pain management: adequate  Airway patency: patent  Anesthetic complications: No anesthetic complications  PONV Status: none  Cardiovascular status: acceptable  Respiratory status: acceptable  Hydration status: acceptable

## 2021-06-16 NOTE — BRIEF OP NOTE
ESOPHAGOGASTRODUODENOSCOPY  Progress Note    Marilee Stapleton  6/16/2021    Pre-op Diagnosis:   Epigastric pain [R10.13]       Post-Op Diagnosis Codes:     * Epigastric pain [R10.13]     * Reflux esophagitis [K21.00]     * Gastritis [K29.70]    Procedure/CPT® Codes:        Procedure(s):  ESOPHAGOGASTRODUODENOSCOPY with biopsies    Surgeon(s):  Lee Tubbs MD    Anesthesia: Monitored Anesthesia Care    Staff:   Circulator: Riri Majano RN  Scrub Person: Margarette Ryan  Orientee: Kandy Mccarthy RN         Estimated Blood Loss: none    Urine Voided: * No values recorded between 6/16/2021 12:25 PM and 6/16/2021 12:42 PM *    Specimens:                Specimens     ID Source Type Tests Collected By Collected At Frozen?    A Gastric, Body Tissue · TISSUE PATHOLOGY EXAM   Lee Tubbs MD 6/16/21 1238     This specimen was not marked as sent.    B Esophagus, Distal Tissue · TISSUE PATHOLOGY EXAM   Lee Tubbs MD 6/16/21 1238     This specimen was not marked as sent.                Drains: * No LDAs found *    Findings: Normal Duodenum  Mild gastritis-Biopsy  Reflux Esophagitis-Biopsy    Complications: None          Lee Tubbs MD     Date: 6/16/2021  Time: 12:43 EDT

## 2021-06-16 NOTE — H&P
Patient Care Team:  Provider, No Known as PCP - General    CHIEF COMPLAINT: dyspepsia    HISTORY OF PRESENT ILLNESS:  Persistent epigastric symptoms despite daily PPI no dysphagia nor weight loss    Past Medical History:   Diagnosis Date   • Abnormal Pap smear of cervix 2018    ASCUS + HPV =- no follow up   • Cervical dysplasia     RUMA 2 on bx, neg LEEP   • S/P LEEP 2020     Past Surgical History:   Procedure Laterality Date   • CERVICAL BIOPSY  W/ LOOP ELECTRODE EXCISION     •  SECTION      placenta previa   •  SECTION     • LEEP N/A 2020    Procedure: LOOP ELECTROCAUTERY EXCISION PROCEDURE;  Surgeon: Sarah Berger MD;  Location: [facility];  Service: Obstetrics/Gynecology;  Laterality: N/A;     Family History   Problem Relation Age of Onset   • Breast cancer Neg Hx    • Ovarian cancer Neg Hx    • Uterine cancer Neg Hx    • Colon cancer Neg Hx    • Prostate cancer Neg Hx    • Deep vein thrombosis Neg Hx      Social History     Tobacco Use   • Smoking status: Never Smoker   • Smokeless tobacco: Never Used   Vaping Use   • Vaping Use: Never used   Substance Use Topics   • Alcohol use: No   • Drug use: No     Medications Prior to Admission   Medication Sig Dispense Refill Last Dose   • Calcium Carbonate-Simethicone (MAALOX MAX PO) Take  by mouth.   Past Month at Unknown time   • Diclofenac Sodium (VOLTAREN) 1 % gel gel APPLY TOPICALLY 4 TIMES DAILY TO LEFT SHOULDER AS NEEDED.   Past Month at Unknown time   • fluconazole (DIFLUCAN) 150 MG tablet TAKE ONE TABLET BY MOUTH ONCE THEN REPEAT DOSE IN 3 DAYS   Past Month at Unknown time   • fluticasone (FLONASE) 50 MCG/ACT nasal spray USE 1 SPRAY(S) IN EACH NOSTRIL ONCE DAILY FOR 2 WEEKS AND THEN 2 SPAYS ONCE DAILY   Past Month at Unknown time   • omeprazole (PrilOSEC) 20 MG capsule Take 1 capsule by mouth Daily. 30 capsule 1 Past Month at Unknown time   • ondansetron ODT (ZOFRAN-ODT) 8 MG disintegrating tablet One tablet po q 6  "hours PRN nausea and vomiting 10 tablet 0 Past Month at Unknown time   • pantoprazole (PROTONIX) 40 MG EC tablet Take 40 mg by mouth Daily.   Past Month at Unknown time   • vitamin D (ERGOCALCIFEROL) 1.25 MG (93814 UT) capsule capsule Take 50,000 Units by mouth 1 (One) Time Per Week.   Past Month at Unknown time   • vitamin E 100 UNIT capsule Take 100 Units by mouth Daily.   Past Month at Unknown time   • cetirizine (zyrTEC) 10 MG tablet Take 10 mg by mouth Daily.   More than a month at Unknown time     Allergies:  Amoxicillin    REVIEW OF SYSTEMS:  Please see the above history of present illness for pertinent positives and negatives.  The remainder of the patient's systems have been reviewed and are negative.     Vital Signs  Temp:  [98.3 °F (36.8 °C)] 98.3 °F (36.8 °C)  Heart Rate:  [80] 80  Resp:  [14] 14  BP: (107)/(65) 107/65    Flowsheet Rows      First Filed Value   Admission Height  172.7 cm (67.99\") Documented at 06/16/2021 1104   Admission Weight  71.1 kg (156 lb 12.8 oz) Documented at 06/16/2021 1104           Physical Exam:  Physical Exam   Constitutional: Patient appears well-developed and well-nourished and in no acute distress   HEENT:   Head: Normocephalic and atraumatic.   Eyes:  Pupils are equal, round, and reactive to light. EOM are intact. Sclerae are anicteric and non-injected.  Mouth and Throat: Patient has moist mucous membranes. Oropharynx is clear of any erythema or exudate.     Neck: Neck supple. No JVD present. No thyromegaly present. No lymphadenopathy present.  Cardiovascular: Regular rate, regular rhythm, S1 normal and S2 normal.  Exam reveals no gallop and no friction rub.  No murmur heard.  Pulmonary/Chest: Lungs are clear to auscultation bilaterally. No respiratory distress. No wheezes. No rhonchi. No rales.   Abdominal: Soft. Bowel sounds are normal. No distension and no mass. There is no hepatosplenomegaly. There is Epigastric  tenderness.   Musculoskeletal: Normal Muscle " tone  Extremities: No edema. Pulses are palpable in all 4 extremities.  Neurological: Patient is alert and oriented to person, place, and time. Cranial nerves II-XII are grossly intact with no focal deficits.  Skin: Skin is warm. No rash noted. Nails show no clubbing.  No cyanosis or erythema.    Debilities/Disabilities Identified: None  Emotional Behavior: Appropriate     Results Review:   I reviewed the patient's new clinical results.    Lab Results (most recent)     None          Imaging Results (Most Recent)     None        reviewed    ECG/EMG Results (most recent)     None        reviewed    Assessment/Plan   dyspepsia/  EGD      I discussed the patient's findings and my recommendations with patient.     Lee Tubbs MD  06/16/21  12:24 EDT    Time: 10 min prior to procedure.

## 2021-06-17 LAB
LAB AP CASE REPORT: NORMAL
LAB AP DIAGNOSIS COMMENT: NORMAL
PATH REPORT.FINAL DX SPEC: NORMAL
PATH REPORT.GROSS SPEC: NORMAL

## 2021-06-22 RX ORDER — CLARITHROMYCIN 500 MG/1
500 TABLET, COATED ORAL 2 TIMES DAILY
Qty: 28 TABLET | Refills: 0 | Status: SHIPPED | OUTPATIENT
Start: 2021-06-22 | End: 2021-06-22

## 2021-06-22 RX ORDER — METRONIDAZOLE 500 MG/1
500 TABLET ORAL 3 TIMES DAILY
Qty: 42 TABLET | Refills: 0 | Status: SHIPPED | OUTPATIENT
Start: 2021-06-22 | End: 2021-06-22

## 2021-06-22 RX ORDER — METRONIDAZOLE 500 MG/1
500 TABLET ORAL 3 TIMES DAILY
Qty: 42 TABLET | Refills: 0 | Status: SHIPPED | OUTPATIENT
Start: 2021-06-22 | End: 2021-07-06

## 2021-06-22 RX ORDER — CLARITHROMYCIN 500 MG/1
500 TABLET, COATED ORAL 2 TIMES DAILY
Qty: 28 TABLET | Refills: 0 | Status: SHIPPED | OUTPATIENT
Start: 2021-06-22 | End: 2021-07-06

## 2021-07-19 ENCOUNTER — HOSPITAL ENCOUNTER (OUTPATIENT)
Dept: MAMMOGRAPHY | Facility: HOSPITAL | Age: 41
Discharge: HOME | End: 2021-07-19
Admitting: OBSTETRICS & GYNECOLOGY

## 2021-07-19 DIAGNOSIS — Z12.31 VISIT FOR SCREENING MAMMOGRAM: ICD-10-CM

## 2021-07-19 PROCEDURE — 77067 SCR MAMMO BI INCL CAD: CPT

## 2021-07-19 PROCEDURE — 77063 BREAST TOMOSYNTHESIS BI: CPT

## 2021-08-16 ENCOUNTER — OFFICE VISIT (OUTPATIENT)
Dept: GASTROENTEROLOGY | Facility: CLINIC | Age: 41
End: 2021-08-16

## 2021-08-16 VITALS
DIASTOLIC BLOOD PRESSURE: 68 MMHG | SYSTOLIC BLOOD PRESSURE: 98 MMHG | BODY MASS INDEX: 24.01 KG/M2 | WEIGHT: 158.4 LBS | HEIGHT: 68 IN

## 2021-08-16 DIAGNOSIS — R10.13 EPIGASTRIC PAIN: Primary | ICD-10-CM

## 2021-08-16 DIAGNOSIS — K21.00 GASTROESOPHAGEAL REFLUX DISEASE WITH ESOPHAGITIS WITHOUT HEMORRHAGE: ICD-10-CM

## 2021-08-16 PROCEDURE — 99213 OFFICE O/P EST LOW 20 MIN: CPT | Performed by: NURSE PRACTITIONER

## 2021-08-16 RX ORDER — PANTOPRAZOLE SODIUM 40 MG/1
40 TABLET, DELAYED RELEASE ORAL DAILY
Qty: 90 TABLET | Refills: 3 | Status: SHIPPED | OUTPATIENT
Start: 2021-08-16 | End: 2021-10-25

## 2021-08-16 NOTE — PATIENT INSTRUCTIONS
Discussed the importance of taking Pantoprazole/Protonix, 40mg daily before breakfast permanently due to known risk with long term acid reflux.      Infección por Helicobacter Pylori  Helicobacter Pylori Infection  La infección por Helicobacter pylori es leopoldo infección bacteriana en el estómago. Las infecciones a sunil plazo (crónicas) pueden causar irritación en el estómago (gastritis), úlceras en el estómago (úlceras gástricas) y úlceras en la parte superior del intestino (úlceras duodenales). Tener esta infección también puede aumentar villa riesgo de padecer cáncer de estómago y un tipo de cáncer de los glóbulos blancos (linfoma) que afecta al estómago.  ¿Cuáles son las causas?  Esta infección es causada por la bacteria Helicobacter pylori (H. pylori). Muchas personas saludables tienen esta bacteria en el revestimiento del estómago. Además, la bacteria puede contagiarse de leopoldo persona a otra por contacto a través de la materia fecal (heces) o la saliva. No se sabe por qué algunas personas desarrollan úlceras, gastritis o cáncer a partir de la bacteria.  ¿Qué incrementa el riesgo?  Es más probable que tenga esta afección si:  · Tiene familiares con esta infección.  · Convive con muchas otras personas, james en un dormitorio.  · Es de origen africano, hispano o asiático.  ¿Cuáles son los signos o los síntomas?  La mayoría de las personas con esta infección no tienen síntomas. Si tiene síntomas, estos pueden incluir los siguientes:  · Acidez estomacal.  · Dolor de estómago.  · Náuseas.  · Vómitos. Puede presentar philippe en el vómito a causa de las úlceras.  · Pérdida del apetito.  · Mal aliento.  ¿Cómo se diagnostica?  Esta afección se puede diagnosticar en función de lo siguiente:  · Los síntomas y antecedentes médicos.  · Un examen físico.  · Análisis de philippe.  · Pruebas de heces.  · Leopoldo prueba del aliento.  · Un procedimiento que consiste en colocarle un tubo con leopoldo cámara en el extremo por la garganta para  examinarle el estómago y la parte superior del intestino (endoscopía eber).  · Extracción y análisis de leopoldo muestra de tejido del revestimiento del estómago (biopsia). Leopoldo biopsia puede realizarse maykel leopoldo endoscopía eber.  ¿Cómo se trata?    Esta afección se trata mediante leopoldo combinación de medicamentos (terapia triple) maykel varias semanas. La terapia triple incluye un medicamento para reducir la cantidad de ácido en el estómago y dos tipos de antibióticos. Rocio tratamiento puede reducir el riesgo de padecer cáncer.  Después del tratamiento, es posible que deba volver a realizarse leopoldo prueba de H. pylori. En algunos casos, es posible que el tratamiento se deba repetir si no ha eliminado todas las bacterias.  Siga estas indicaciones en villa casa:    · [place] los medicamentos de venta martin y los recetados solamente james se lo haya indicado el médico.  · [place] los antibióticos james se lo haya indicado el médico. No deje de kyle los antibióticos aunque comience a sentirse mejor.  · [place] yen actividades normales james se lo haya indicado el médico. Pregúntele al médico qué actividades son seguras para usted.  · [place] estas medidas para evitar infecciones futuras:  ? Lávese las kayode frecuentemente con agua y jabón. Use desinfectante para kayode si no dispone de agua y jabón.  ? No coma alimentos ni ap agua que pueda leelee estado en contacto con heces o saliva.  · Concurra a todas las visitas de control james se lo haya indicado el médico. [place] es importante. Es posible que necesite realizarse estudios para comprobar que el tratamiento funcionó.  Comuníquese con un médico si yen síntomas:  · No mejoran con el tratamiento.  · Regresan después del tratamiento.  Resumen  · La infección por Helicobacter pylori es leopoldo infección estomacal causada por la bacteria Helicobacter pylori (H. pylori).  · Esta infección puede causar irritación en el estómago (gastritis), úlceras en el estómago (úlceras gástricas) y úlceras en la  parte superior del intestino (úlceras duodenales).  · Esta afección se trata mediante leopoldo combinación de medicamentos (terapia triple) maykel varias semanas.  · [place] los antibióticos james se lo haya indicado el médico. No deje de kyle los antibióticos aunque comience a sentirse mejor.  Esta información no tiene james fin reemplazar el consejo del médico. Asegúrese de hacerle al médico cualquier pregunta que tenga.  Document Revised: 02/05/2019 Document Reviewed: 02/05/2019  Elsevier Patient Education © 2021 [employer] Inc[employer]

## 2021-08-16 NOTE — PROGRESS NOTES
PATIENT INFORMATION  Marilee Stapleton     - 1980    CHIEF COMPLAINT  Chief Complaint   Patient presents with   • H pylori   • Gastritis       HISTORY OF PRESENT ILLNESS  21  EGD was for Dyspepsia biopsy was Positive for HP Gastritis, reflux esophagitis, Negative for Davis's     So I sent in Biaxin and Flagyl but needs to continue present meds and Follows up in 8 weeks with Cira    Occasional HB but has been feeling better for 2-3 weeks now after course of abx for H pylori.          REVIEWED PERTINENT RESULTS/ LABS  Lab Results   Component Value Date    CASEREPORT  2021     Surgical Pathology Report                         Case: [id number]                                  Authorizing Provider:  Lee Tubbs        Collected:           2021 12:38 PM                                 MD Clement                                                                   Ordering Location:     [facility]   Received:            2021 12:55 PM                                 OR                                                                           Pathologist:           Nathan Aguirre MD                                                         Specimens:   1) - Gastric, Body                                                                                  2) - Esophagus, Distal                                                                     FINALDX  2021     1. Gastric, Body Biopsy: Benign gastric mucosa with  A. Minimally active moderate chronic inflammation.         B. Occasional Helicobacter pylori-organisms identified by routine staining.         C. Inflammatory change, no dysplasia nor malignancy identified.    2. Distal Esophagus Biopsy: Benign squamous and glandular mucosa with         A. A few eosinophils noted suggesting chronic reflux.         B. No intestinal metaplasia identified by routine and/or alcian blue staining (see comment).         C. Reactive change  noted, no dysplasia nor malignancy identified.    JAT/sms/pkm       Lab Results   Component Value Date    HGB 14.1 01/25/2021    MCV 97.6 (H) 01/25/2021     01/25/2021    ALT 19 01/25/2021    AST 15 01/25/2021      No results found.    CURRENT MEDICATIONS    Current Outpatient Medications:   •  pantoprazole (PROTONIX) 40 MG EC tablet, Take 1 tablet by mouth Daily., Disp: 90 tablet, Rfl: 3    ALLERGIES  Amoxicillin    REVIEW OF SYSTEMS  ROS: 14 point review of systems was performed and all other systems were reviewed and are negative except for documented findings in HPI and today's encounter.   Review of Systems   Constitutional: Negative for activity change, chills, fever and unexpected weight change.   HENT: Negative for congestion.    Eyes: Negative for visual disturbance.   Respiratory: Negative for shortness of breath.    Cardiovascular: Negative for chest pain and palpitations.   Gastrointestinal: Positive for abdominal distention and constipation. Negative for abdominal pain and blood in stool.   Endocrine: Negative for cold intolerance and heat intolerance.   Genitourinary: Negative for hematuria.   Musculoskeletal: Negative for gait problem.   Skin: Negative for color change.   Allergic/Immunologic: Negative for immunocompromised state.   Neurological: Negative for weakness and light-headedness.   Hematological: Negative for adenopathy.   Psychiatric/Behavioral: Negative for sleep disturbance. The patient is not nervous/anxious.          History     Last Reviewed by Cira Lucia APRN on 8/16/2021 at  2:23 PM    Sections Reviewed    Tobacco, Family, Medical, Surgical             ACTIVE PROBLEMS  Patient Active Problem List    Diagnosis    • Gastroesophageal reflux disease with esophagitis without hemorrhage [K21.00]    • Epigastric pain [R10.13]    • S/P LEEP [Z98.890]    • Dysplasia of cervix, high grade RUMA 2 [N87.1]    • Breast pain [N64.4]    • ASCUS with positive high risk HPV cervical  [F57.466, R87.537]          PAST MEDICAL HISTORY  Past Medical History:   Diagnosis Date   • Abnormal Pap smear of cervix 2018    ASCUS + HPV =- no follow up   • Cervical dysplasia     RUMA 2 on bx, neg LEEP   • S/P LEEP 2020         SURGICAL HISTORY  Past Surgical History:   Procedure Laterality Date   • CERVICAL BIOPSY  W/ LOOP ELECTRODE EXCISION     •  SECTION      placenta previa   •  SECTION     • ENDOSCOPY N/A 2021    Procedure: ESOPHAGOGASTRODUODENOSCOPY with biopsies;  Surgeon: Lee Tubbs MD;  Location: [facility];  Service: Gastroenterology;  Laterality: N/A;  Reflux  Biopsies: gastric, distal esophagus   • LEEP N/A 2020    Procedure: LOOP ELECTROCAUTERY EXCISION PROCEDURE;  Surgeon: Sarah Berger MD;  Location: [facility];  Service: Obstetrics/Gynecology;  Laterality: N/A;         FAMILY HISTORY  Family History   Problem Relation Age of Onset   • Breast cancer Neg Hx    • Ovarian cancer Neg Hx    • Uterine cancer Neg Hx    • Colon cancer Neg Hx    • Prostate cancer Neg Hx    • Deep vein thrombosis Neg Hx          SOCIAL HISTORY  Social History     Occupational History   • Occupation: unemployed   Tobacco Use   • Smoking status: Never Smoker   • Smokeless tobacco: Never Used   Vaping Use   • Vaping Use: Never used   Substance and Sexual Activity   • Alcohol use: No   • Drug use: No   • Sexual activity: Yes     Partners: Male     Birth control/protection: Condom         LAST RESULTS  See also labs reviewed above.   Admission on 2021, Discharged on 2021   Component Date Value Ref Range Status   • Case Report 2021    Final                    Value:Surgical Pathology Report                         Case: [id number]                                  Authorizing Provider:  Lee Tubbs        Collected:           2021 12:38 PM                                 MD Clement                                                    "                Ordering Location:     [facility]   Received:            06/16/2021 12:55 PM                                 OR                                                                           Pathologist:           Nathan Aguirre MD                                                         Specimens:   1) - Gastric, Body                                                                                  2) - Esophagus, Distal                                                                    • Final Diagnosis 06/16/2021    Final                    Value:This result contains rich text formatting which cannot be displayed here.   • Comment 06/16/2021    Final                    Value:This result contains rich text formatting which cannot be displayed here.   • Gross Description 06/16/2021    Final                    Value:This result contains rich text formatting which cannot be displayed here.     No results found.    PHYSICAL EXAM  Debilities/Disabilities Identified: None  Emotional Behavior: Appropriate  41 y.o. female  Wt Readings from Last 3 Encounters:   08/16/21 71.8 kg (158 lb 6.4 oz)   06/16/21 71.1 kg (156 lb 12.8 oz)   05/20/21 73.5 kg (162 lb)     Ht Readings from Last 1 Encounters:   08/16/21 172.7 cm (68\")     Body mass index is 24.08 kg/m².  Vitals:    08/16/21 1345   BP: 98/68   BP Location: Left arm   Patient Position: Sitting   Cuff Size: Large Adult   Weight: 71.8 kg (158 lb 6.4 oz)   Height: 172.7 cm (68\")     Vital signs reviewed.          Physical Exam  Abdominal:      Tenderness: There is no abdominal tenderness.      Comments: No tenderness today.            CLINICAL DATA REVIEWED   reviewed previous lab results and integrated with today's visit, reviewed notes from other physicians and/or last GI encounter, reviewed previous endoscopy results and available photos, reviewed surgical pathology results from previous biopsies      ASSESSMENT  Diagnoses and all orders for this " visit:    Epigastric pain    Gastroesophageal reflux disease with esophagitis without hemorrhage  -     pantoprazole (PROTONIX) 40 MG EC tablet; Take 1 tablet by mouth Daily.          PLAN  Return if symptoms worsen or fail to improve.     Discussed the importance of taking Pantoprazole/Protonix, 40mg daily before breakfast permanently due to known risk with long term acid reflux.  [place]  used for visit to day and questions answered.     I have discussed the above plan with the patient.  They verbalize understanding and are in agreement with the plan.  They have been advised to contact the office for any questions, concerns, or changes related to their health.    30 min spent with patient today >50% spent counseling about natural history and expected course of assessed complaint and reviewed treatment options that have been tried and not tried and those currently available. Questions answered.

## 2021-09-07 ENCOUNTER — TELEPHONE (OUTPATIENT)
Dept: GASTROENTEROLOGY | Facility: CLINIC | Age: 41
End: 2021-09-07

## 2021-09-07 NOTE — TELEPHONE ENCOUNTER
Dr. Shan Zendejas from allergy and asthma is requesting provider to call to discuss pt.    Office number [phone]

## 2021-09-08 NOTE — TELEPHONE ENCOUNTER
Presented there with post prandial swelling of left chest and side of her bodfy and Dr Zendejas sent an HIAA urine out that was mildly elevated but no other real flushing signs. Will bring her back into the office to rediscuss her symptoms as wel as possible further w/u.   Normal for race

## 2021-10-25 ENCOUNTER — OFFICE VISIT (OUTPATIENT)
Dept: OBSTETRICS AND GYNECOLOGY | Facility: CLINIC | Age: 41
End: 2021-10-25

## 2021-10-25 VITALS
BODY MASS INDEX: 24.25 KG/M2 | HEIGHT: 68 IN | WEIGHT: 160 LBS | DIASTOLIC BLOOD PRESSURE: 60 MMHG | SYSTOLIC BLOOD PRESSURE: 110 MMHG

## 2021-10-25 DIAGNOSIS — Z01.419 PAP SMEAR, LOW-RISK: Primary | ICD-10-CM

## 2021-10-25 DIAGNOSIS — Z12.31 ENCOUNTER FOR SCREENING MAMMOGRAM FOR MALIGNANT NEOPLASM OF BREAST: ICD-10-CM

## 2021-10-25 DIAGNOSIS — Z01.419 ROUTINE GYNECOLOGICAL EXAMINATION: ICD-10-CM

## 2021-10-25 DIAGNOSIS — Z87.42 HISTORY OF ABNORMAL CERVICAL PAP SMEAR: ICD-10-CM

## 2021-10-25 DIAGNOSIS — Z11.51 SPECIAL SCREENING EXAMINATION FOR HUMAN PAPILLOMAVIRUS (HPV): ICD-10-CM

## 2021-10-25 DIAGNOSIS — Z11.3 SCREENING EXAMINATION FOR STD (SEXUALLY TRANSMITTED DISEASE): ICD-10-CM

## 2021-10-25 LAB

## 2021-10-25 PROCEDURE — 99396 PREV VISIT EST AGE 40-64: CPT | Performed by: OBSTETRICS & GYNECOLOGY

## 2021-10-25 PROCEDURE — 81025 URINE PREGNANCY TEST: CPT | Performed by: OBSTETRICS & GYNECOLOGY

## 2021-10-25 PROCEDURE — 81002 URINALYSIS NONAUTO W/O SCOPE: CPT | Performed by: OBSTETRICS & GYNECOLOGY

## 2021-10-25 NOTE — PROGRESS NOTES
GYN Exam    CC- Here for annual     Marilee Stapleton is a 41 y.o. female est pt who presents for annual. She had a ASCUS + HPV pap and RUMA 2 on bx that resulted in a LEEP in 2020, which was negative for dysplasia. Her ECC was negative. Her pap in 2021 was LGSIL + HPV and had RUMA 1 on bx. This is her second 6 month repeat pap.     OB History        2    Para   2    Term   2       0    AB   0    Living   2       SAB   0    IAB   0    Ectopic   0    Molar        Multiple   0    Live Births   2          Obstetric Comments   Considering pregnancy             Menarche: 15  Current contraception: none  History of abnormal Pap smear: yes- LEEP 2020- neg path  History of abnormal mammogram: no  Family history of uterine, colon or ovarian cancer: no  Family history of breast cancer: no  H/o STDs: none  Last pap:2021- LGSIL + HPV, RUMA 1 on bx  Gardasil:missed  JIGNA: none    Health Maintenance   Topic Date Due   • ANNUAL PHYSICAL  Never done   • COVID-19 Vaccine (1) Never done   • TDAP/TD VACCINES (1 - Tdap) Never done   • Annual Gynecologic Pelvic and Breast Exam  2021   • INFLUENZA VACCINE  Never done   • PAP SMEAR  10/25/2024   • HEPATITIS C SCREENING  Completed   • Pneumococcal Vaccine 0-64  Aged Out       Past Medical History:   Diagnosis Date   • Abnormal Pap smear of cervix 2018    ASCUS + HPV =- no follow up   • Cervical dysplasia     RUMA 2 on bx, neg LEEP   • S/P LEEP 2020       Past Surgical History:   Procedure Laterality Date   • CERVICAL BIOPSY  W/ LOOP ELECTRODE EXCISION     •  SECTION      placenta previa   •  SECTION     • ENDOSCOPY N/A 2021    Procedure: ESOPHAGOGASTRODUODENOSCOPY with biopsies;  Surgeon: Lee Tubbs MD;  Location: [facility];  Service: Gastroenterology;  Laterality: N/A;  Reflux  Biopsies: gastric, distal esophagus   • LEEP N/A 2020    Procedure: LOOP ELECTROCAUTERY EXCISION PROCEDURE;  Surgeon: Sarah Berger  "MD Berenice;  Location: [facility];  Service: Obstetrics/Gynecology;  Laterality: N/A;       No current outpatient medications on file.    Allergies   Allergen Reactions   • Amoxicillin Itching       Social History     Tobacco Use   • Smoking status: Never Smoker   • Smokeless tobacco: Never Used   Vaping Use   • Vaping Use: Never used   Substance Use Topics   • Alcohol use: No   • Drug use: No       Family History   Problem Relation Age of Onset   • Breast cancer Neg Hx    • Ovarian cancer Neg Hx    • Uterine cancer Neg Hx    • Colon cancer Neg Hx    • Prostate cancer Neg Hx    • Deep vein thrombosis Neg Hx        Review of Systems   Constitutional: Negative for activity change, appetite change, fatigue, fever and unexpected weight change.   HENT:        + sour taste in mouth   Eyes: Negative for photophobia and visual disturbance.   Respiratory: Negative for cough and shortness of breath.    Cardiovascular: Negative for chest pain and palpitations.   Gastrointestinal: Negative for abdominal distention, abdominal pain, constipation, diarrhea and nausea.   Endocrine: Negative for cold intolerance and heat intolerance.   Genitourinary: Negative for dyspareunia, dysuria, menstrual problem, pelvic pain, vaginal bleeding and vaginal discharge.   Musculoskeletal: Negative for back pain.   Skin: Negative for color change and rash.   Allergic/Immunologic: Negative for environmental allergies and food allergies.   Neurological: Negative for headaches.   Hematological: Negative for adenopathy. Does not bruise/bleed easily.   Psychiatric/Behavioral: Negative for dysphoric mood. The patient is not nervous/anxious.    All other systems reviewed and are negative.      /60   Ht 172.7 cm (68\")   Wt 72.6 kg (160 lb)   LMP 10/15/2021   Breastfeeding No   BMI 24.33 kg/m²     Physical Exam   Constitutional: She is oriented to person, place, and time. She appears well-developed.   HENT:   Head: Normocephalic and atraumatic. "   Eyes: Conjunctivae are normal. No scleral icterus.   Neck: No thyromegaly present.   Cardiovascular: Normal rate and regular rhythm.   Pulmonary/Chest: Effort normal and breath sounds normal. Right breast exhibits no inverted nipple, no mass, no nipple discharge, no skin change and no tenderness. Left breast exhibits no inverted nipple, no mass, no nipple discharge, no skin change and no tenderness.   Breasts are symmetrical, large size  No palpable masses, no skin changes, no LAD     Abdominal: Soft. Normal appearance. She exhibits no distension and no mass. There is no abdominal tenderness. There is no rebound and no guarding. No hernia.   Genitourinary:    Right adnexa and left adnexa normal.   There is no rash, tenderness, lesion or injury on the right labia. There is no rash, tenderness, lesion or injury on the left labia. Uterus is not deviated, not enlarged, not fixed and not tender. Cervix exhibits no motion tenderness, no lesion, no discharge and no friability.    No vaginal discharge, erythema, tenderness or bleeding.   No erythema, tenderness or bleeding in the vagina.    No foreign body in the vagina.      No signs of injury or lesions in the vagina.     Neurological: She is alert and oriented to person, place, and time.   Skin: Skin is warm and dry.   Psychiatric: Her behavior is normal. Mood, judgment and thought content normal.   Nursing note and vitals reviewed.            Assessment/Plan      1) GYN HM: pap/HPV  SBE demonstrated and encouraged.  2) STD screening: accepts. Condoms encouraged.  3) Contraception: condoms  4) Family Planning: family planning: considering kids, uncertain, encourage folic acid daily  5) Diet and Exercise discussed  6) Smoking Status: No  7) Social: no issues  8) MMG- UTD 7/2021, B1. Schedule MM 7/2022  9)I saw the patient with a face mask, gloves and eye protection  The patient herself was masked.  Social distancing was observed as appropriate.  10)Parts of this  document have been copied or forwarded from her previous visits and have been reviewed, updated and edited as indicated.   11)Follow up prn or 1 year annual      Diagnoses and all orders for this visit:    1. Pap smear, low-risk (Primary)  -     POC Urinalysis Dipstick  -     POC Pregnancy, Urine  -     Cancel: IgP, Aptima HPV  -     IGP,CtNgTv,Apt HPV,rfx 16 / 18,45    2. Routine gynecological examination  -     POC Urinalysis Dipstick  -     POC Pregnancy, Urine  -     Cancel: IgP, Aptima HPV  -     IGP,CtNgTv,Apt HPV,rfx 16 / 18,45    3. Special screening examination for human papillomavirus (HPV)  -     POC Urinalysis Dipstick  -     POC Pregnancy, Urine  -     Cancel: IgP, Aptima HPV  -     IGP,CtNgTv,Apt HPV,rfx 16 / 18,45    4. Screening examination for STD (sexually transmitted disease)  -     Hepatitis B Surface Antigen  -     Hepatitis C Antibody  -     HIV-1 / O / 2 Ag / Antibody 4th Generation  -     HSV 1 & 2 - Specific Antibody, IgG  -     RPR, Rfx Qn RPR / Confirm TP    5. Encounter for screening mammogram for malignant neoplasm of breast  -     Mammo Screening Bilateral With CAD; Future    6. History of abnormal cervical Pap smear          Sarah Berger MD  10/25/2021  08:59 EDT

## 2021-10-26 LAB
HBV SURFACE AG SERPL QL IA: NEGATIVE
HCV AB S/CO SERPL IA: <0.1 S/CO RATIO (ref 0–0.9)
HIV 1+2 AB+HIV1 P24 AG SERPL QL IA: NON REACTIVE
HSV1 IGG SER IA-ACNC: 10.8 INDEX (ref 0–0.9)
HSV2 IGG SER IA-ACNC: <0.91 INDEX (ref 0–0.9)
RPR SER QL: NON REACTIVE

## 2021-10-28 LAB
C TRACH RRNA CVX QL NAA+PROBE: NEGATIVE
CYTOLOGIST CVX/VAG CYTO: ABNORMAL
CYTOLOGY CVX/VAG DOC CYTO: ABNORMAL
CYTOLOGY CVX/VAG DOC THIN PREP: ABNORMAL
DX ICD CODE: ABNORMAL
DX ICD CODE: ABNORMAL
HIV 1 & 2 AB SER-IMP: ABNORMAL
HPV I/H RISK 4 DNA CVX QL PROBE+SIG AMP: NEGATIVE
N GONORRHOEA RRNA CVX QL NAA+PROBE: NEGATIVE
OTHER STN SPEC: ABNORMAL
PATHOLOGIST CVX/VAG CYTO: ABNORMAL
RECOM F/U CVX/VAG CYTO: ABNORMAL
STAT OF ADQ CVX/VAG CYTO-IMP: ABNORMAL
T VAGINALIS RRNA SPEC QL NAA+PROBE: NEGATIVE

## 2021-12-04 ENCOUNTER — APPOINTMENT (OUTPATIENT)
Dept: GENERAL RADIOLOGY | Facility: HOSPITAL | Age: 41
End: 2021-12-04

## 2021-12-04 ENCOUNTER — HOSPITAL ENCOUNTER (INPATIENT)
Facility: HOSPITAL | Age: 41
LOS: 6 days | Discharge: HOME | End: 2021-12-11
Attending: EMERGENCY MEDICINE | Admitting: INTERNAL MEDICINE

## 2021-12-04 DIAGNOSIS — U07.1 PNEUMONIA DUE TO COVID-19 VIRUS: Primary | ICD-10-CM

## 2021-12-04 DIAGNOSIS — R09.02 HYPOXIA: ICD-10-CM

## 2021-12-04 DIAGNOSIS — J12.82 PNEUMONIA DUE TO COVID-19 VIRUS: Primary | ICD-10-CM

## 2021-12-04 LAB
ARTERIAL PATENCY WRIST A: POSITIVE
ATMOSPHERIC PRESS: 744 MMHG
BASE EXCESS BLDA CALC-SCNC: 4.5 MMOL/L (ref 0–2)
BASOPHILS # BLD AUTO: 0.02 10*3/MM3 (ref 0–0.2)
BASOPHILS NFR BLD AUTO: 0.2 % (ref 0–1.5)
BDY SITE: ABNORMAL
BODY TEMPERATURE: 37 C
D-LACTATE SERPL-SCNC: 1.3 MMOL/L (ref 0.5–2)
DEPRECATED RDW RBC AUTO: 41 FL (ref 37–54)
EOSINOPHIL # BLD AUTO: 0.05 10*3/MM3 (ref 0–0.4)
EOSINOPHIL NFR BLD AUTO: 0.6 % (ref 0.3–6.2)
ERYTHROCYTE [DISTWIDTH] IN BLOOD BY AUTOMATED COUNT: 12.1 % (ref 12.3–15.4)
GAS FLOW AIRWAY: 2 LPM
HCG SERPL QL: NEGATIVE
HCO3 BLDA-SCNC: 28.4 MMOL/L (ref 20–26)
HCT VFR BLD AUTO: 43.6 % (ref 34–46.6)
HGB BLD-MCNC: 14.2 G/DL (ref 12–15.9)
HGB BLDA-MCNC: 13.9 G/DL (ref 13.5–17.5)
IMM GRANULOCYTES # BLD AUTO: 0.02 10*3/MM3 (ref 0–0.05)
IMM GRANULOCYTES NFR BLD AUTO: 0.2 % (ref 0–0.5)
LYMPHOCYTES # BLD AUTO: 1.03 10*3/MM3 (ref 0.7–3.1)
LYMPHOCYTES NFR BLD AUTO: 12 % (ref 19.6–45.3)
Lab: ABNORMAL
MCH RBC QN AUTO: 29.9 PG (ref 26.6–33)
MCHC RBC AUTO-ENTMCNC: 32.6 G/DL (ref 31.5–35.7)
MCV RBC AUTO: 91.8 FL (ref 79–97)
MODALITY: ABNORMAL
MONOCYTES # BLD AUTO: 0.79 10*3/MM3 (ref 0.1–0.9)
MONOCYTES NFR BLD AUTO: 9.2 % (ref 5–12)
NEUTROPHILS NFR BLD AUTO: 6.65 10*3/MM3 (ref 1.7–7)
NEUTROPHILS NFR BLD AUTO: 77.8 % (ref 42.7–76)
NRBC BLD AUTO-RTO: 0 /100 WBC (ref 0–0.2)
PCO2 BLDA: 39 MM HG (ref 35–45)
PCO2 TEMP ADJ BLD: 39 MM HG (ref 35–45)
PH BLDA: 7.47 PH UNITS (ref 7.35–7.45)
PH, TEMP CORRECTED: 7.47 PH UNITS (ref 7.35–7.45)
PLATELET # BLD AUTO: 319 10*3/MM3 (ref 140–450)
PMV BLD AUTO: 10.1 FL (ref 6–12)
PO2 BLDA: 72.2 MM HG (ref 83–108)
PO2 TEMP ADJ BLD: 72.2 MM HG (ref 83–108)
RBC # BLD AUTO: 4.75 10*6/MM3 (ref 3.77–5.28)
SAO2 % BLDCOA: 96.2 % (ref 94–99)
VENTILATOR MODE: ABNORMAL
WBC NRBC COR # BLD: 8.56 10*3/MM3 (ref 3.4–10.8)

## 2021-12-04 PROCEDURE — 85025 COMPLETE CBC W/AUTO DIFF WBC: CPT | Performed by: EMERGENCY MEDICINE

## 2021-12-04 PROCEDURE — 83615 LACTATE (LD) (LDH) ENZYME: CPT | Performed by: EMERGENCY MEDICINE

## 2021-12-04 PROCEDURE — 83605 ASSAY OF LACTIC ACID: CPT | Performed by: EMERGENCY MEDICINE

## 2021-12-04 PROCEDURE — 84703 CHORIONIC GONADOTROPIN ASSAY: CPT | Performed by: EMERGENCY MEDICINE

## 2021-12-04 PROCEDURE — 82728 ASSAY OF FERRITIN: CPT | Performed by: EMERGENCY MEDICINE

## 2021-12-04 PROCEDURE — 84145 PROCALCITONIN (PCT): CPT | Performed by: EMERGENCY MEDICINE

## 2021-12-04 PROCEDURE — 80053 COMPREHEN METABOLIC PANEL: CPT | Performed by: EMERGENCY MEDICINE

## 2021-12-04 PROCEDURE — 99284 EMERGENCY DEPT VISIT MOD MDM: CPT

## 2021-12-04 PROCEDURE — 82803 BLOOD GASES ANY COMBINATION: CPT

## 2021-12-04 PROCEDURE — 99285 EMERGENCY DEPT VISIT HI MDM: CPT | Performed by: EMERGENCY MEDICINE

## 2021-12-04 PROCEDURE — 71045 X-RAY EXAM CHEST 1 VIEW: CPT

## 2021-12-04 PROCEDURE — 94761 N-INVAS EAR/PLS OXIMETRY MLT: CPT

## 2021-12-04 PROCEDURE — 36600 WITHDRAWAL OF ARTERIAL BLOOD: CPT

## 2021-12-04 RX ADMIN — HYDROCODONE BITARTRATE AND HOMATROPINE METHYLBROMIDE 5 ML: 5; 1.5 SOLUTION ORAL at 23:20

## 2021-12-05 PROBLEM — U07.1 PNEUMONIA DUE TO COVID-19 VIRUS: Status: ACTIVE | Noted: 2021-12-05

## 2021-12-05 PROBLEM — J12.82 PNEUMONIA DUE TO COVID-19 VIRUS: Status: ACTIVE | Noted: 2021-12-05

## 2021-12-05 LAB
ALBUMIN SERPL-MCNC: 3.1 G/DL (ref 3.5–5.2)
ALBUMIN SERPL-MCNC: 3.3 G/DL (ref 3.5–5.2)
ALBUMIN/GLOB SERPL: 0.8 G/DL
ALBUMIN/GLOB SERPL: 0.8 G/DL
ALP SERPL-CCNC: 114 U/L (ref 39–117)
ALP SERPL-CCNC: 126 U/L (ref 39–117)
ALT SERPL W P-5'-P-CCNC: 48 U/L (ref 1–33)
ALT SERPL W P-5'-P-CCNC: 60 U/L (ref 1–33)
ANION GAP SERPL CALCULATED.3IONS-SCNC: 11 MMOL/L (ref 5–15)
ANION GAP SERPL CALCULATED.3IONS-SCNC: 12.8 MMOL/L (ref 5–15)
AST SERPL-CCNC: 50 U/L (ref 1–32)
AST SERPL-CCNC: 61 U/L (ref 1–32)
BASOPHILS # BLD AUTO: 0.02 10*3/MM3 (ref 0–0.2)
BASOPHILS # BLD MANUAL: 0.05 10*3/MM3 (ref 0–0.2)
BASOPHILS NFR BLD AUTO: 0.4 % (ref 0–1.5)
BASOPHILS NFR BLD MANUAL: 1 % (ref 0–1.5)
BILIRUB SERPL-MCNC: 0.5 MG/DL (ref 0–1.2)
BILIRUB SERPL-MCNC: 0.5 MG/DL (ref 0–1.2)
BUN SERPL-MCNC: 9 MG/DL (ref 6–20)
BUN SERPL-MCNC: 9 MG/DL (ref 6–20)
BUN/CREAT SERPL: 14.3 (ref 7–25)
BUN/CREAT SERPL: 18.4 (ref 7–25)
CALCIUM SPEC-SCNC: 8.6 MG/DL (ref 8.6–10.5)
CALCIUM SPEC-SCNC: 8.9 MG/DL (ref 8.6–10.5)
CHLORIDE SERPL-SCNC: 101 MMOL/L (ref 98–107)
CHLORIDE SERPL-SCNC: 103 MMOL/L (ref 98–107)
CO2 SERPL-SCNC: 24 MMOL/L (ref 22–29)
CO2 SERPL-SCNC: 24.2 MMOL/L (ref 22–29)
CREAT SERPL-MCNC: 0.49 MG/DL (ref 0.57–1)
CREAT SERPL-MCNC: 0.63 MG/DL (ref 0.57–1)
DEPRECATED RDW RBC AUTO: 41.1 FL (ref 37–54)
EOSINOPHIL # BLD AUTO: 0.05 10*3/MM3 (ref 0–0.4)
EOSINOPHIL # BLD MANUAL: 0.16 10*3/MM3 (ref 0–0.4)
EOSINOPHIL NFR BLD AUTO: 1 % (ref 0.3–6.2)
EOSINOPHIL NFR BLD MANUAL: 3 % (ref 0.3–6.2)
ERYTHROCYTE [DISTWIDTH] IN BLOOD BY AUTOMATED COUNT: 12.2 % (ref 12.3–15.4)
FERRITIN SERPL-MCNC: 393 NG/ML (ref 13–150)
GFR SERPL CREATININE-BSD FRML MDRD: 105 ML/MIN/1.73
GFR SERPL CREATININE-BSD FRML MDRD: 140 ML/MIN/1.73
GLOBULIN UR ELPH-MCNC: 3.8 GM/DL
GLOBULIN UR ELPH-MCNC: 4.3 GM/DL
GLUCOSE SERPL-MCNC: 103 MG/DL (ref 65–99)
GLUCOSE SERPL-MCNC: 107 MG/DL (ref 65–99)
HCT VFR BLD AUTO: 41.7 % (ref 34–46.6)
HGB BLD-MCNC: 13.4 G/DL (ref 12–15.9)
IMM GRANULOCYTES # BLD AUTO: 0.02 10*3/MM3 (ref 0–0.05)
IMM GRANULOCYTES NFR BLD AUTO: 0.4 % (ref 0–0.5)
LARGE PLATELETS: ABNORMAL
LDH SERPL-CCNC: 326 U/L (ref 135–214)
LYMPHOCYTES # BLD AUTO: 1.22 10*3/MM3 (ref 0.7–3.1)
LYMPHOCYTES # BLD MANUAL: 0.95 10*3/MM3 (ref 0.7–3.1)
LYMPHOCYTES NFR BLD AUTO: 23.2 % (ref 19.6–45.3)
LYMPHOCYTES NFR BLD MANUAL: 16 % (ref 5–12)
MCH RBC QN AUTO: 29.6 PG (ref 26.6–33)
MCHC RBC AUTO-ENTMCNC: 32.1 G/DL (ref 31.5–35.7)
MCV RBC AUTO: 92.3 FL (ref 79–97)
MONOCYTES # BLD AUTO: 0.8 10*3/MM3 (ref 0.1–0.9)
MONOCYTES # BLD: 0.84 10*3/MM3 (ref 0.1–0.9)
MONOCYTES NFR BLD AUTO: 15.2 % (ref 5–12)
NEUTROPHILS # BLD AUTO: 3.26 10*3/MM3 (ref 1.7–7)
NEUTROPHILS NFR BLD AUTO: 3.14 10*3/MM3 (ref 1.7–7)
NEUTROPHILS NFR BLD AUTO: 59.8 % (ref 42.7–76)
NEUTROPHILS NFR BLD MANUAL: 62 % (ref 42.7–76)
NRBC BLD AUTO-RTO: 0 /100 WBC (ref 0–0.2)
PLATELET # BLD AUTO: 305 10*3/MM3 (ref 140–450)
PMV BLD AUTO: 10.1 FL (ref 6–12)
POTASSIUM SERPL-SCNC: 3.6 MMOL/L (ref 3.5–5.2)
POTASSIUM SERPL-SCNC: 3.8 MMOL/L (ref 3.5–5.2)
PROCALCITONIN SERPL-MCNC: 0.04 NG/ML (ref 0–0.25)
PROCALCITONIN SERPL-MCNC: 0.04 NG/ML (ref 0–0.25)
PROT SERPL-MCNC: 6.9 G/DL (ref 6–8.5)
PROT SERPL-MCNC: 7.6 G/DL (ref 6–8.5)
RBC # BLD AUTO: 4.52 10*6/MM3 (ref 3.77–5.28)
RBC MORPH BLD: NORMAL
SODIUM SERPL-SCNC: 138 MMOL/L (ref 136–145)
SODIUM SERPL-SCNC: 138 MMOL/L (ref 136–145)
VARIANT LYMPHS NFR BLD MANUAL: 15 % (ref 19.6–45.3)
VARIANT LYMPHS NFR BLD MANUAL: 3 % (ref 0–5)
WBC MORPH BLD: NORMAL
WBC NRBC COR # BLD: 5.25 10*3/MM3 (ref 3.4–10.8)

## 2021-12-05 PROCEDURE — G0378 HOSPITAL OBSERVATION PER HR: HCPCS

## 2021-12-05 PROCEDURE — 80053 COMPREHEN METABOLIC PANEL: CPT | Performed by: INTERNAL MEDICINE

## 2021-12-05 PROCEDURE — 99223 1ST HOSP IP/OBS HIGH 75: CPT | Performed by: INTERNAL MEDICINE

## 2021-12-05 PROCEDURE — 63710000001 DEXAMETHASONE PER 0.25 MG: Performed by: INTERNAL MEDICINE

## 2021-12-05 PROCEDURE — 25010000002 ENOXAPARIN PER 10 MG: Performed by: INTERNAL MEDICINE

## 2021-12-05 PROCEDURE — 85025 COMPLETE CBC W/AUTO DIFF WBC: CPT | Performed by: INTERNAL MEDICINE

## 2021-12-05 PROCEDURE — 94799 UNLISTED PULMONARY SVC/PX: CPT

## 2021-12-05 PROCEDURE — 84145 PROCALCITONIN (PCT): CPT | Performed by: INTERNAL MEDICINE

## 2021-12-05 PROCEDURE — 94761 N-INVAS EAR/PLS OXIMETRY MLT: CPT

## 2021-12-05 RX ORDER — BENZONATATE 100 MG/1
200 CAPSULE ORAL 3 TIMES DAILY PRN
Status: DISCONTINUED | OUTPATIENT
Start: 2021-12-05 | End: 2021-12-11 | Stop reason: HOSPADM

## 2021-12-05 RX ORDER — ACETAMINOPHEN 650 MG/1
650 SUPPOSITORY RECTAL EVERY 4 HOURS PRN
Status: DISCONTINUED | OUTPATIENT
Start: 2021-12-05 | End: 2021-12-11 | Stop reason: HOSPADM

## 2021-12-05 RX ORDER — ONDANSETRON 2 MG/ML
4 INJECTION INTRAMUSCULAR; INTRAVENOUS EVERY 6 HOURS PRN
Status: DISCONTINUED | OUTPATIENT
Start: 2021-12-05 | End: 2021-12-11 | Stop reason: HOSPADM

## 2021-12-05 RX ORDER — GUAIFENESIN/DEXTROMETHORPHAN 100-10MG/5
5 SYRUP ORAL EVERY 4 HOURS PRN
Status: DISCONTINUED | OUTPATIENT
Start: 2021-12-05 | End: 2021-12-11 | Stop reason: HOSPADM

## 2021-12-05 RX ORDER — ACETAMINOPHEN 325 MG/1
650 TABLET ORAL EVERY 4 HOURS PRN
Status: DISCONTINUED | OUTPATIENT
Start: 2021-12-05 | End: 2021-12-11 | Stop reason: HOSPADM

## 2021-12-05 RX ORDER — DEXAMETHASONE 4 MG/1
6 TABLET ORAL ONCE
Status: COMPLETED | OUTPATIENT
Start: 2021-12-05 | End: 2021-12-05

## 2021-12-05 RX ORDER — SODIUM CHLORIDE 9 MG/ML
125 INJECTION, SOLUTION INTRAVENOUS CONTINUOUS
Status: DISCONTINUED | OUTPATIENT
Start: 2021-12-05 | End: 2021-12-06

## 2021-12-05 RX ORDER — SODIUM CHLORIDE 0.9 % (FLUSH) 0.9 %
10 SYRINGE (ML) INJECTION AS NEEDED
Status: DISCONTINUED | OUTPATIENT
Start: 2021-12-05 | End: 2021-12-11 | Stop reason: HOSPADM

## 2021-12-05 RX ORDER — DEXAMETHASONE 4 MG/1
6 TABLET ORAL
Status: DISCONTINUED | OUTPATIENT
Start: 2021-12-06 | End: 2021-12-06

## 2021-12-05 RX ORDER — ALBUTEROL SULFATE 90 UG/1
2 AEROSOL, METERED RESPIRATORY (INHALATION) EVERY 4 HOURS PRN
Status: DISCONTINUED | OUTPATIENT
Start: 2021-12-05 | End: 2021-12-11 | Stop reason: HOSPADM

## 2021-12-05 RX ORDER — SODIUM CHLORIDE 0.9 % (FLUSH) 0.9 %
10 SYRINGE (ML) INJECTION EVERY 12 HOURS SCHEDULED
Status: DISCONTINUED | OUTPATIENT
Start: 2021-12-05 | End: 2021-12-11 | Stop reason: HOSPADM

## 2021-12-05 RX ORDER — ACETAMINOPHEN 160 MG/5ML
650 SOLUTION ORAL EVERY 4 HOURS PRN
Status: DISCONTINUED | OUTPATIENT
Start: 2021-12-05 | End: 2021-12-11 | Stop reason: HOSPADM

## 2021-12-05 RX ORDER — SODIUM CHLORIDE 9 MG/ML
40 INJECTION, SOLUTION INTRAVENOUS AS NEEDED
Status: DISCONTINUED | OUTPATIENT
Start: 2021-12-05 | End: 2021-12-11 | Stop reason: HOSPADM

## 2021-12-05 RX ORDER — ONDANSETRON 4 MG/1
4 TABLET, FILM COATED ORAL EVERY 6 HOURS PRN
Status: DISCONTINUED | OUTPATIENT
Start: 2021-12-05 | End: 2021-12-11 | Stop reason: HOSPADM

## 2021-12-05 RX ORDER — NITROGLYCERIN 0.4 MG/1
0.4 TABLET SUBLINGUAL
Status: DISCONTINUED | OUTPATIENT
Start: 2021-12-05 | End: 2021-12-11 | Stop reason: HOSPADM

## 2021-12-05 RX ADMIN — GUAIFENESIN SYRUP AND DEXTROMETHORPHAN 5 ML: 100; 10 SYRUP ORAL at 15:40

## 2021-12-05 RX ADMIN — BENZONATATE 200 MG: 100 CAPSULE ORAL at 15:40

## 2021-12-05 RX ADMIN — SODIUM CHLORIDE 125 ML/HR: 9 INJECTION, SOLUTION INTRAVENOUS at 02:00

## 2021-12-05 RX ADMIN — SODIUM CHLORIDE, PRESERVATIVE FREE 10 ML: 5 INJECTION INTRAVENOUS at 21:32

## 2021-12-05 RX ADMIN — SODIUM CHLORIDE 125 ML/HR: 9 INJECTION, SOLUTION INTRAVENOUS at 12:42

## 2021-12-05 RX ADMIN — ENOXAPARIN SODIUM 40 MG: 40 INJECTION SUBCUTANEOUS at 04:00

## 2021-12-05 RX ADMIN — DEXAMETHASONE 6 MG: 4 TABLET ORAL at 11:42

## 2021-12-05 RX ADMIN — SODIUM CHLORIDE 125 ML/HR: 9 INJECTION, SOLUTION INTRAVENOUS at 21:38

## 2021-12-05 NOTE — H&P
[facility] HOSPITALIST     Provider, No Known    CHIEF COMPLAINT:     Shortness of breath    HISTORY OF PRESENT ILLNESS:    Patient is a very pleasant 41 year old  female who presented to the ED complaining of moderate - severe shortness of breath that has been worsening over the past 10 days. Patient states she initially had a cough and body aches and test positive for COVID 19 on 2021. She states she has been progressively worsening and decided to come to the ED tonight. She has a productive cough with white and brown sputum. She has also lost taste and smell. She reports she has not been vaccinated against COVID 19.        services were used for this exam.     Past Medical History:   Diagnosis Date   • Abnormal Pap smear of cervix 2018    ASCUS + HPV =- no follow up   • Cervical dysplasia     RUMA 2 on bx, neg LEEP   • S/P LEEP 2020     Past Surgical History:   Procedure Laterality Date   • CERVICAL BIOPSY  W/ LOOP ELECTRODE EXCISION     •  SECTION      placenta previa   •  SECTION     •  SECTION     • ENDOSCOPY N/A 2021    Procedure: ESOPHAGOGASTRODUODENOSCOPY with biopsies;  Surgeon: Lee Tubbs MD;  Location: [facility];  Service: Gastroenterology;  Laterality: N/A;  Reflux  Biopsies: gastric, distal esophagus   • LEEP N/A 2020    Procedure: LOOP ELECTROCAUTERY EXCISION PROCEDURE;  Surgeon: Sarah Berger MD;  Location: [facility];  Service: Obstetrics/Gynecology;  Laterality: N/A;     Family History   Problem Relation Age of Onset   • Breast cancer Neg Hx    • Ovarian cancer Neg Hx    • Uterine cancer Neg Hx    • Colon cancer Neg Hx    • Prostate cancer Neg Hx    • Deep vein thrombosis Neg Hx      Social History     Tobacco Use   • Smoking status: Never Smoker   • Smokeless tobacco: Never Used   Vaping Use   • Vaping Use: Never used   Substance Use Topics   • Alcohol use: Never   • Drug use:  "Never     (Not in a hospital admission)    Allergies:  Amoxapine and Amoxicillin      There is no immunization history on file for this patient.        REVIEW OF SYSTEMS:    Please see the above history of present illness for pertinent positives and negatives.  The remainder of the patient's systems have been reviewed and are negative.     Vital Signs  Temp:  [98.6 °F (37 °C)] 98.6 °F (37 °C)  Heart Rate:  [78-98] 78  Resp:  [20] 20  BP: (91-98)/(64-81) 91/65    Flowsheet Rows      First Filed Value   Admission Height 167.6 cm (66\") Documented at 12/04/2021 2237   Admission Weight 68.6 kg (151 lb 4.8 oz) Documented at 12/04/2021 2237             Physical Exam:    Physical Exam  Vitals reviewed.   Constitutional:       General: She is not in acute distress.     Appearance: She is normal weight.   HENT:      Head: Normocephalic and atraumatic.      Mouth/Throat:      Mouth: Mucous membranes are moist.   Eyes:      General: No scleral icterus.     Pupils: Pupils are equal, round, and reactive to light.   Cardiovascular:      Rate and Rhythm: Normal rate and regular rhythm.      Heart sounds: No murmur heard.      Pulmonary:      Effort: Pulmonary effort is normal.      Breath sounds: Rhonchi and rales present. No wheezing.   Abdominal:      General: Bowel sounds are normal. There is no distension.      Palpations: Abdomen is soft.      Tenderness: There is no abdominal tenderness.   Musculoskeletal:      Right lower leg: No edema.      Left lower leg: No edema.   Skin:     General: Skin is warm and dry.      Findings: No rash.   Neurological:      General: No focal deficit present.      Mental Status: She is alert.      Comments: Moves all extremities    Psychiatric:         Mood and Affect: Mood normal.         Behavior: Behavior normal.               Results Review:    I reviewed the patient's new clinical results.  Lab Results (most recent)     Procedure Component Value Units Date/Time    Procalcitonin [[id number]]  " (Normal) Collected: 12/04/21 2327    Specimen: Blood Updated: 12/05/21 0003     Procalcitonin 0.04 ng/mL     Narrative:      Results may be falsely decreased if patient taking Biotin.     Comprehensive Metabolic Panel [[id number]]  (Abnormal) Collected: 12/04/21 2327    Specimen: Blood Updated: 12/05/21 0001     Glucose 103 mg/dL      BUN 9 mg/dL      Creatinine 0.63 mg/dL      Sodium 138 mmol/L      Potassium 3.6 mmol/L      Chloride 101 mmol/L      CO2 24.2 mmol/L      Calcium 8.6 mg/dL      Total Protein 7.6 g/dL      Albumin 3.30 g/dL      ALT (SGPT) 60 U/L      AST (SGOT) 61 U/L      Alkaline Phosphatase 126 U/L      Total Bilirubin 0.5 mg/dL      eGFR Non African Amer 105 mL/min/1.73      Globulin 4.3 gm/dL      A/G Ratio 0.8 g/dL      BUN/Creatinine Ratio 14.3     Anion Gap 12.8 mmol/L     Narrative:      GFR Normal >60  Chronic Kidney Disease <60  Kidney Failure <15      Lactate Dehydrogenase [[id number]]  (Abnormal) Collected: 12/04/21 2327    Specimen: Blood Updated: 12/05/21 0001      U/L     Ferritin [[id number]]  (Abnormal) Collected: 12/04/21 2327    Specimen: Blood Updated: 12/05/21 0001     Ferritin 393.00 ng/mL     Narrative:      Results may be falsely decreased if patient taking Biotin.      Blood Gas, Arterial - [[id number]]  (Abnormal) Collected: 12/04/21 2345    Specimen: Arterial Blood Updated: 12/04/21 2350     Site Right Radial     Mannie's Test Positive     pH, Arterial 7.471 pH units      Comment: 83 Value above reference range        pCO2, Arterial 39.0 mm Hg      pO2, Arterial 72.2 mm Hg      Comment: 84 Value below reference range        HCO3, Arterial 28.4 mmol/L      Comment: 83 Value above reference range        Base Excess, Arterial 4.5 mmol/L      Comment: 83 Value above reference range        O2 Saturation, Arterial 96.2 %      Hemoglobin, Blood Gas 13.9 g/dL      Temperature 37.0 C      Barometric Pressure for Blood Gas 744 mmHg      Modality Nasal Cannula     Flow Rate 2.0 lpm       Ventilator Mode NA     Collected by JENNY     Comment: Meter: [id number]     :  [id number]        pCO2, Temperature Corrected 39.0 mm Hg      pH, Temp Corrected 7.471 pH Units      pO2, Temperature Corrected 72.2 mm Hg     hCG, Serum, Qualitative [[id number]]  (Normal) Collected: 12/04/21 2327    Specimen: Blood Updated: 12/04/21 2350     HCG Qualitative Negative    Lactic Acid, Plasma [[id number]]  (Normal) Collected: 12/04/21 2327    Specimen: Blood Updated: 12/04/21 2348     Lactate 1.3 mmol/L     CBC & Differential [[id number]]  (Abnormal) Collected: 12/04/21 2327    Specimen: Blood Updated: 12/04/21 2336    Narrative:      The following orders were created for panel order CBC & Differential.  Procedure                               Abnormality         Status                     ---------                               -----------         ------                     CBC Auto Differential[[id number]]        Abnormal            Final result                 Please view results for these tests on the individual orders.    CBC Auto Differential [[id number]]  (Abnormal) Collected: 12/04/21 2327    Specimen: Blood Updated: 12/04/21 2336     WBC 8.56 10*3/mm3      RBC 4.75 10*6/mm3      Hemoglobin 14.2 g/dL      Hematocrit 43.6 %      MCV 91.8 fL      MCH 29.9 pg      MCHC 32.6 g/dL      RDW 12.1 %      RDW-SD 41.0 fl      MPV 10.1 fL      Platelets 319 10*3/mm3      Neutrophil % 77.8 %      Lymphocyte % 12.0 %      Monocyte % 9.2 %      Eosinophil % 0.6 %      Basophil % 0.2 %      Immature Grans % 0.2 %      Neutrophils, Absolute 6.65 10*3/mm3      Lymphocytes, Absolute 1.03 10*3/mm3      Monocytes, Absolute 0.79 10*3/mm3      Eosinophils, Absolute 0.05 10*3/mm3      Basophils, Absolute 0.02 10*3/mm3      Immature Grans, Absolute 0.02 10*3/mm3      nRBC 0.0 /100 WBC           Imaging Results (Most Recent)     Procedure Component Value Units Date/Time    XR Chest AP [[id number]] Collected: 12/05/21 0023      Updated: 12/05/21 0026    Narrative:      CR Chest 1 Vw    INDICATION:   Cough and fever and shortness of air today. Covid positive patient     COMPARISON:    May 20    FINDINGS:  Portable AP view(s) of the chest.  There are numerous bilateral patchy infiltrates suggesting Covid 19 pneumonia. Heart size is normal. Bones are normal      Impression:      Patchy bilateral infiltrates consistent with Covid 19 pneumonia    Signer Name: Tirso Locke MD   Signed: 12/5/2021 12:23 AM   Workstation Name: [facility]    [facility]        CXR personally reviewed and shows bilateral infiltrates     ECG/EMG Results (most recent)     None        Telemetry reviewed and shows NSR    Assessment/Plan   Active Hospital Problems    Diagnosis  POA   • Pneumonia due to COVID-19 virus [U07.1, J12.82]  Yes      Resolved Hospital Problems   No resolved problems to display.       Acute Hypoxic Respiratory failure d/t COVID 19 pneumonia  - supplemental oxygen  - pro-rodriguez negative   - add decadron 6mg daily x 10 days   - albuterol prn  - tessalon pearls and robitussin prn    Mild Transaminitis  - likely d/t COVID 19   - monitor with routine labs     Borderline hypotension   - add IV fluids NS   - monitor with routine vital signs and make adjustments as needed    DVT Prophylaxis  - enoxaparin       I discussed the patient's findings and my recommendations with patient.       This patient has been examined wearing appropriate Personal Protective Equipment  12/05/21      Shine Burgos DO  12/05/21  01:43 EST

## 2021-12-05 NOTE — PROGRESS NOTES
Chart reviewed.  Discussed with nurse she is stable although remains on oxygen.  Blood pressure appears to be improving.  - will continue current care for Covid with oxygen supplementation and supportive care

## 2021-12-05 NOTE — ED NOTES
#18g s/l to rt A/C without redness,pain or edema. IVF infusing well.     Sunitha Robledo RN  12/05/21 2386

## 2021-12-05 NOTE — ED PROVIDER NOTES
Subjective     History provided by:  Patient    History of Present Illness    · Chief complaint: Shortness of breath    · Location: Lungs    · Quality/Severity: Patient reports moderately severe shortness of breath.  She has a cough productive of white and brown sputum.  She reports body aches and headache and weakness and fatigue.  She has sharp substernal chest pain when she takes a deep breath.    · Timing/Onset: Symptoms started 11/25/2021 10 days ago.    · Modifying Factors: Exertion and coughing exacerbates her shortness of breath.    · Associated symptoms: She reports loss of taste and smell.    · Narrative: The patient is a 41-year-old  female who first became symptomatic 11/25/2021 with a cough and fatigue.  She has since developed worsening shortness of breath.  Her cough is productive of white and brown sputum.  She reports generalized body aches and severe fatigue and headaches.  She tested positive for Covid 11/28/2021.  The patient has not been vaccinated against Covid.  Her last menstrual period was in November and states she is not been sexually active for a month.  She does not smoke.  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    Review of Systems   Constitutional: Positive for activity change, appetite change, chills and fatigue. Fever:  Patient is not sure.   HENT: Positive for congestion, sore throat and voice change. Negative for facial swelling, rhinorrhea and trouble swallowing.    Eyes: Negative for photophobia, pain and visual disturbance.   Respiratory: Positive for cough and shortness of breath. Negative for wheezing.    Cardiovascular: Positive for chest pain.   Gastrointestinal: Negative for abdominal pain, diarrhea, nausea and vomiting.   Endocrine: Negative for polydipsia and polyuria.   Genitourinary: Negative for difficulty urinating, dysuria, frequency, pelvic pain and vaginal bleeding.   Musculoskeletal: Positive for back pain and myalgias. Negative for neck pain and  "neck stiffness.   Skin: Negative for color change and rash.   Neurological: Positive for dizziness, weakness, light-headedness and headaches.     Past Medical History:   Diagnosis Date   • Abnormal Pap smear of cervix 2018    ASCUS + HPV =- no follow up   • Cervical dysplasia     RUMA 2 on bx, neg LEEP   • S/P LEEP 2020     BP 93/67   Pulse 85   Temp 98.6 °F (37 °C) (Oral)   Resp 20   Ht 167.6 cm (66\")   Wt 68.6 kg (151 lb 4.8 oz)   LMP 2021 (Exact Date)   SpO2 98%   BMI 24.42 kg/m²     Past Medical History:   Diagnosis Date   • Abnormal Pap smear of cervix 2018    ASCUS + HPV =- no follow up   • Cervical dysplasia     RUMA 2 on bx, neg LEEP   • S/P LEEP 2020       Allergies   Allergen Reactions   • Amoxapine Itching   • Amoxicillin Itching       Past Surgical History:   Procedure Laterality Date   • CERVICAL BIOPSY  W/ LOOP ELECTRODE EXCISION     •  SECTION      placenta previa   •  SECTION     •  SECTION     • ENDOSCOPY N/A 2021    Procedure: ESOPHAGOGASTRODUODENOSCOPY with biopsies;  Surgeon: Lee Tubbs MD;  Location: [facility];  Service: Gastroenterology;  Laterality: N/A;  Reflux  Biopsies: gastric, distal esophagus   • LEEP N/A 2020    Procedure: LOOP ELECTROCAUTERY EXCISION PROCEDURE;  Surgeon: Sarah Berger MD;  Location: [facility];  Service: Obstetrics/Gynecology;  Laterality: N/A;       Family History   Problem Relation Age of Onset   • Breast cancer Neg Hx    • Ovarian cancer Neg Hx    • Uterine cancer Neg Hx    • Colon cancer Neg Hx    • Prostate cancer Neg Hx    • Deep vein thrombosis Neg Hx        Social History     Socioeconomic History   • Marital status:    • Number of children: 2   Tobacco Use   • Smoking status: Never Smoker   • Smokeless tobacco: Never Used   Vaping Use   • Vaping Use: Never used   Substance and Sexual Activity   • Alcohol use: Never   • Drug use: Never   • Sexual activity: " Yes     Partners: Male     Birth control/protection: Condom           Objective   Physical Exam  Vitals and nursing note reviewed.   Constitutional:       Appearance: She is ill-appearing.      Comments: The patient appears ill and in mild respiratory distress.  Review of her vital signs: She is afebrile with a temperature 98.6, tachypneic with a respiratory rate of 20 with a low oxygen saturation of 90 to 91% on room air, heart rate is normal 85, blood pressure low normal 96/68.   HENT:      Head: Normocephalic and atraumatic.   Eyes:      Extraocular Movements: Extraocular movements intact.      Pupils: Pupils are equal, round, and reactive to light.   Cardiovascular:      Rate and Rhythm: Normal rate and regular rhythm.      Heart sounds: No murmur heard.      Pulmonary:      Effort: Tachypnea present.      Breath sounds: No wheezing.      Comments: The patient has inspiratory crackles throughout all lobes of both lungs consistent with Covid pneumonia.  Abdominal:      General: Bowel sounds are normal.      Palpations: Abdomen is soft.      Tenderness: There is no abdominal tenderness.   Musculoskeletal:      Cervical back: Normal range of motion and neck supple.   Lymphadenopathy:      Cervical: No cervical adenopathy.   Skin:     General: Skin is warm and dry.      Capillary Refill: Capillary refill takes less than 2 seconds.      Coloration: Skin is not cyanotic or pale.      Findings: No ecchymosis or erythema.   Neurological:      General: No focal deficit present.      Mental Status: She is alert and oriented to person, place, and time.      Cranial Nerves: No cranial nerve deficit.      Motor: No weakness.   Psychiatric:      Comments: Mood consistent with being ill.         Procedures           ED Course  ED Course as of 12/05/21 0112   Sun Dec 05, 2021   0026 The patient arrived hypoxic with initial O2 saturation after walking in of 82% which improved to 90% at rest.  She was placed on oxygen 2 L via  nasal cannula improving her pulse oximetry to 98%. [TP]   0029 Review the patient's test results: Her chest x-ray shows significant bilateral patchy infiltrates in the lower halves of the lungs consistent with Covid pneumonia.  Her CBC has a normal white count 8.56 with a left shift.  Hemoglobin, hematocrit and platelets within normal limits.  A blood glass on 2 L he had nasal cannula has a pH of 7.47 with a normal PCO2 of 39 and a decreased PO2 of 72.2 with a 96% 0.2% saturation.  Lactic acid and procalcitonin were normal.  Ferritin elevated at 393 and LDH elevated at 326.  CMP has normal electrolytes and normal renal function test.  She had mild elevation of her transaminases with a normal total bilirubin.  Beta hCG negative. [TP]   0030 00:33 patient discussed with Dr. Burgos, hospitalist, who agreed to admit the patient to observation. [TP]   0109 I discussed with the patient's  and son her medical diagnosis and condition. [TP]   0112 Patient was placed in isolation and examined while donned with appropriate PPE including N 95 mask and face shield.     [TP]      ED Course User Index  [TP] Melecio Flores MD                                                 MDM  Number of Diagnoses or Management Options  Hypoxia: new and requires workup  Pneumonia due to COVID-19 virus: new and requires workup     Amount and/or Complexity of Data Reviewed  Clinical lab tests: ordered and reviewed  Tests in the radiology section of CPT®: ordered and reviewed  Discuss the patient with other providers: yes    Risk of Complications, Morbidity, and/or Mortality  Presenting problems: high  Diagnostic procedures: high  Management options: high  General comments: My differential diagnosis for dyspnea includes but is not limited to:  Asthma, COPD, pneumonia, pulmonary embolus, acute respiratory distress syndrome, pneumothorax, pleural effusion, pulmonary fibrosis, congestive heart failure, myocardial infarction, DKA, uremia,  acidosis, sepsis, anemia, drug related, hyperventilation, CNS disease, COVID-19    Patient Progress  Patient progress: stable      Final diagnoses:   Pneumonia due to COVID-19 virus   Hypoxia       ED Disposition  ED Disposition     ED Disposition Condition Comment    Decision to Admit  Level of Care: Med/Surg [1]   Diagnosis: Pneumonia due to COVID-19 virus [[id number]]   Admitting Physician: MARTINEZ NIELSEN [[id number]]   Bed Request Comments: Covid pneumonia with hypoxia            No follow-up provider specified.       Medication List      No changes were made to your prescriptions during this visit.         Labs Reviewed   COMPREHENSIVE METABOLIC PANEL - Abnormal; Notable for the following components:       Result Value    Glucose 103 (*)     Albumin 3.30 (*)     ALT (SGPT) 60 (*)     AST (SGOT) 61 (*)     Alkaline Phosphatase 126 (*)     All other components within normal limits    Narrative:     GFR Normal >60  Chronic Kidney Disease <60  Kidney Failure <15     LACTATE DEHYDROGENASE - Abnormal; Notable for the following components:     (*)     All other components within normal limits   FERRITIN - Abnormal; Notable for the following components:    Ferritin 393.00 (*)     All other components within normal limits    Narrative:     Results may be falsely decreased if patient taking Biotin.     CBC WITH AUTO DIFFERENTIAL - Abnormal; Notable for the following components:    RDW 12.1 (*)     Neutrophil % 77.8 (*)     Lymphocyte % 12.0 (*)     All other components within normal limits   BLOOD GAS, ARTERIAL - Abnormal; Notable for the following components:    pH, Arterial 7.471 (*)     pO2, Arterial 72.2 (*)     HCO3, Arterial 28.4 (*)     Base Excess, Arterial 4.5 (*)     pH, Temp Corrected 7.471 (*)     pO2, Temperature Corrected 72.2 (*)     All other components within normal limits   HCG, SERUM, QUALITATIVE - Normal   PROCALCITONIN - Normal    Narrative:     Results may be falsely decreased if patient taking  Biotin.    LACTIC ACID, PLASMA - Normal   BLOOD GAS, ARTERIAL   CBC AND DIFFERENTIAL    Narrative:     The following orders were created for panel order CBC & Differential.  Procedure                               Abnormality         Status                     ---------                               -----------         ------                     CBC Auto Differential[[id number]]        Abnormal            Final result                 Please view results for these tests on the individual orders.     XR Chest AP   Final Result   Patchy bilateral infiltrates consistent with Covid 19 pneumonia      Signer Name: Tirso Locke MD    Signed: 12/5/2021 12:23 AM    Workstation Name: [email or web address]     [facility]             Medication List      No changes were made to your prescriptions during this visit.              Melecio Flores MD  12/05/21 0110       Melecio Flores MD  12/05/21 0110       Melecio Flores MD  12/05/21 0112

## 2021-12-05 NOTE — ED NOTES
Pt provided with meal tray. Call light in reach, no needs at this time.      Cathy Alicea, RN  12/05/21 0795

## 2021-12-05 NOTE — ED NOTES
Patient placed in hospital bed for comfort.  Explained per .      Laura Gilbert, RN  12/05/21 7469

## 2021-12-06 LAB
ALBUMIN SERPL-MCNC: 2.9 G/DL (ref 3.5–5.2)
ALBUMIN/GLOB SERPL: 0.7 G/DL
ALP SERPL-CCNC: 108 U/L (ref 39–117)
ALT SERPL W P-5'-P-CCNC: 47 U/L (ref 1–33)
ANION GAP SERPL CALCULATED.3IONS-SCNC: 11.5 MMOL/L (ref 5–15)
AST SERPL-CCNC: 43 U/L (ref 1–32)
BILIRUB SERPL-MCNC: 0.4 MG/DL (ref 0–1.2)
BUN SERPL-MCNC: 7 MG/DL (ref 6–20)
BUN/CREAT SERPL: 17.1 (ref 7–25)
CALCIUM SPEC-SCNC: 8.8 MG/DL (ref 8.6–10.5)
CHLORIDE SERPL-SCNC: 104 MMOL/L (ref 98–107)
CO2 SERPL-SCNC: 19.5 MMOL/L (ref 22–29)
CREAT SERPL-MCNC: 0.41 MG/DL (ref 0.57–1)
CRP SERPL-MCNC: 4.64 MG/DL (ref 0–0.5)
DEPRECATED RDW RBC AUTO: 40.5 FL (ref 37–54)
ERYTHROCYTE [DISTWIDTH] IN BLOOD BY AUTOMATED COUNT: 11.9 % (ref 12.3–15.4)
GFR SERPL CREATININE-BSD FRML MDRD: >150 ML/MIN/1.73
GLOBULIN UR ELPH-MCNC: 4 GM/DL
GLUCOSE SERPL-MCNC: 117 MG/DL (ref 65–99)
HCT VFR BLD AUTO: 40 % (ref 34–46.6)
HGB BLD-MCNC: 12.8 G/DL (ref 12–15.9)
MCH RBC QN AUTO: 29.3 PG (ref 26.6–33)
MCHC RBC AUTO-ENTMCNC: 32 G/DL (ref 31.5–35.7)
MCV RBC AUTO: 91.5 FL (ref 79–97)
PLATELET # BLD AUTO: 365 10*3/MM3 (ref 140–450)
PMV BLD AUTO: 11.1 FL (ref 6–12)
POTASSIUM SERPL-SCNC: 3.9 MMOL/L (ref 3.5–5.2)
PROT SERPL-MCNC: 6.9 G/DL (ref 6–8.5)
RBC # BLD AUTO: 4.37 10*6/MM3 (ref 3.77–5.28)
SODIUM SERPL-SCNC: 135 MMOL/L (ref 136–145)
WBC NRBC COR # BLD: 4.06 10*3/MM3 (ref 3.4–10.8)

## 2021-12-06 PROCEDURE — 94761 N-INVAS EAR/PLS OXIMETRY MLT: CPT

## 2021-12-06 PROCEDURE — 63710000001 DEXAMETHASONE PER 0.25 MG: Performed by: HOSPITALIST

## 2021-12-06 PROCEDURE — 86140 C-REACTIVE PROTEIN: CPT | Performed by: INTERNAL MEDICINE

## 2021-12-06 PROCEDURE — 94618 PULMONARY STRESS TESTING: CPT

## 2021-12-06 PROCEDURE — G0378 HOSPITAL OBSERVATION PER HR: HCPCS

## 2021-12-06 PROCEDURE — 94799 UNLISTED PULMONARY SVC/PX: CPT

## 2021-12-06 PROCEDURE — 99232 SBSQ HOSP IP/OBS MODERATE 35: CPT | Performed by: HOSPITALIST

## 2021-12-06 PROCEDURE — 85027 COMPLETE CBC AUTOMATED: CPT | Performed by: INTERNAL MEDICINE

## 2021-12-06 PROCEDURE — 80053 COMPREHEN METABOLIC PANEL: CPT | Performed by: INTERNAL MEDICINE

## 2021-12-06 PROCEDURE — 63710000001 DEXAMETHASONE PER 0.25 MG: Performed by: INTERNAL MEDICINE

## 2021-12-06 PROCEDURE — 25010000002 ENOXAPARIN PER 10 MG: Performed by: INTERNAL MEDICINE

## 2021-12-06 RX ORDER — DEXAMETHASONE 4 MG/1
6 TABLET ORAL EVERY 12 HOURS SCHEDULED
Status: DISCONTINUED | OUTPATIENT
Start: 2021-12-06 | End: 2021-12-10

## 2021-12-06 RX ADMIN — SODIUM CHLORIDE, PRESERVATIVE FREE 10 ML: 5 INJECTION INTRAVENOUS at 20:01

## 2021-12-06 RX ADMIN — DEXAMETHASONE 6 MG: 4 TABLET ORAL at 08:14

## 2021-12-06 RX ADMIN — BENZONATATE 200 MG: 100 CAPSULE ORAL at 20:01

## 2021-12-06 RX ADMIN — DEXAMETHASONE 6 MG: 4 TABLET ORAL at 20:01

## 2021-12-06 RX ADMIN — ENOXAPARIN SODIUM 40 MG: 40 INJECTION SUBCUTANEOUS at 01:13

## 2021-12-06 RX ADMIN — GUAIFENESIN SYRUP AND DEXTROMETHORPHAN 5 ML: 100; 10 SYRUP ORAL at 20:01

## 2021-12-06 NOTE — PROCEDURES
Exercise Oximetry    Patient Name:Marilee Kraus   MRN: [id number]   Date: 12/06/21             ROOM AIR BASELINE   SpO2% 84   Heart Rate 74   Blood Pressure      EXERCISE ON ROOM AIR SpO2% EXERCISE ON O2 @ 2 LPM SpO2%   1 MINUTE  1 MINUTE sitting on side of bed  84   2 MINUTES  2 MINUTES    3 MINUTES  3 MINUTES    4 MINUTES  4 MINUTES    5 MINUTES  5 MINUTES    6 MINUTES  6 MINUTES               Distance Walked   Distance Walked   Dyspnea (Erin Scale)   Dyspnea (Erin Scale)   Fatigue (Erin Scale)   Fatigue (Erin Scale)   SpO2% Post Exercise   SpO2% Post Exercise   HR Post Exercise   HR Post Exercise   Time to Recovery   Time to Recovery     Comments: At rest on 2lpm, SpO2 is 91%.  Sitting up to the side of the bed sats dropped and required 4lpm for a SpO2 of 90%.  Upon standing and walking 10ft, Pt required 6lpm to maintain a SpO2 of 89%.  Walk ended and Dr Rodríguez notified

## 2021-12-06 NOTE — PLAN OF CARE
Goal Outcome Evaluation:  Plan of Care Reviewed With: patient        Progress: improving  Outcome Summary: VSS, English/Belarusian speaking patient; understands English very well. IVF continued, ISO continued. will continue to monitor.

## 2021-12-06 NOTE — PROGRESS NOTES
"Hospitalist Team      Patient Care Team:  Provider, No Known as PCP - Shruthi Wilkinson MD (Internal Medicine)      Chief Complaint: Follow-up Acute Hypoxic Respiratory Failure due to COVID-19 Pneumonia    Subjective    Feels pretty lousy.  She denies chest pain, but has trouble breathing w/ any exertion.  Appetite is okay, but she is tolerating only a little PO.    Objective    Vital Signs  Temp:  [97.5 °F (36.4 °C)-98.8 °F (37.1 °C)] 97.5 °F (36.4 °C)  Heart Rate:  [61-90] 74  Resp:  [20-34] 26  BP: ()/(62-66) 102/64  Oxygen Therapy  SpO2: (!) 89 %  Pulse Oximetry Type: Continuous  Device (Oxygen Therapy): nasal cannula  Flow (L/min): 2}    Flowsheet Rows      First Filed Value   Admission Height 167.6 cm (66\") Documented at 12/04/2021 2237   Admission Weight 68.6 kg (151 lb 4.8 oz) Documented at 12/04/2021 2237          Physical Exam:    General: Appears stated age in moderate distress.  Appears toxic.  Lungs: Breath sounds are diminished throughout all fields.  Respirations are non-labored.  CV: Regular rate and rhythm.  Radial and pedal pulses are 2+ and symmetric.  Abdomen: Soft and non-tender.  Bowel sounds are diminished.  MSK: No C/C/E.  No asymmetry.  Skin: No evidence of embolic phenomena.  Neuro: CN II-XII grossly intact.  Psych: Flat affect.    Results Review:     I reviewed the patient's new clinical results.    Lab Results (last 24 hours)     Procedure Component Value Units Date/Time    C-reactive Protein [[id number]]  (Abnormal) Collected: 12/06/21 0333    Specimen: Blood Updated: 12/06/21 1119     C-Reactive Protein 4.64 mg/dL     Comprehensive Metabolic Panel [[id number]]  (Abnormal) Collected: 12/06/21 0332    Specimen: Blood Updated: 12/06/21 0510     Glucose 117 mg/dL      BUN 7 mg/dL      Creatinine 0.41 mg/dL      Sodium 135 mmol/L      Potassium 3.9 mmol/L      Chloride 104 mmol/L      CO2 19.5 mmol/L      Calcium 8.8 mg/dL      Total Protein 6.9 g/dL      Albumin 2.90 g/dL  "     ALT (SGPT) 47 U/L      AST (SGOT) 43 U/L      Alkaline Phosphatase 108 U/L      Total Bilirubin 0.4 mg/dL      eGFR Non African Amer >150 mL/min/1.73      Globulin 4.0 gm/dL      A/G Ratio 0.7 g/dL      BUN/Creatinine Ratio 17.1     Anion Gap 11.5 mmol/L     Narrative:      GFR Normal >60  Chronic Kidney Disease <60  Kidney Failure <15      CBC (No Diff) [[id number]]  (Abnormal) Collected: 12/06/21 0332    Specimen: Blood Updated: 12/06/21 0424     WBC 4.06 10*3/mm3      RBC 4.37 10*6/mm3      Hemoglobin 12.8 g/dL      Hematocrit 40.0 %      MCV 91.5 fL      MCH 29.3 pg      MCHC 32.0 g/dL      RDW 11.9 %      RDW-SD 40.5 fl      MPV 11.1 fL      Platelets 365 10*3/mm3           Imaging Results (Last 24 Hours)     ** No results found for the last 24 hours. **          Medication Review:   I have reviewed the patient's current medication list    Current Facility-Administered Medications:   •  acetaminophen (TYLENOL) tablet 650 mg, 650 mg, Oral, Q4H PRN **OR** acetaminophen (TYLENOL) 160 MG/5ML solution 650 mg, 650 mg, Oral, Q4H PRN **OR** acetaminophen (TYLENOL) suppository 650 mg, 650 mg, Rectal, Q4H PRN, Burgos, Birrilla, DO  •  albuterol sulfate HFA (PROVENTIL HFA;VENTOLIN HFA;PROAIR HFA) inhaler 2 puff, 2 puff, Inhalation, Q4H PRN, Burgos, Birrilla, DO  •  benzonatate (TESSALON) capsule 200 mg, 200 mg, Oral, TID PRN, Burgos, Birrilla, DO, 200 mg at 12/05/21 1540  •  dexamethasone (DECADRON) tablet 6 mg, 6 mg, Oral, Daily With Breakfast, Burgos, Birrilla, DO, 6 mg at 12/06/21 0814  •  enoxaparin (LOVENOX) syringe 40 mg, 40 mg, Subcutaneous, Q24H, Burgos, Birrilla, DO, 40 mg at 12/06/21 0113  •  guaiFENesin-dextromethorphan (ROBITUSSIN DM) 100-10 MG/5ML syrup 5 mL, 5 mL, Oral, Q4H PRN, Shine Burgos DO, 5 mL at 12/05/21 1540  •  nitroglycerin (NITROSTAT) SL tablet 0.4 mg, 0.4 mg, Sublingual, Q5 Min PRN, Shine Burgos DO  •  ondansetron (ZOFRAN) tablet 4 mg, 4 mg, Oral, Q6H PRN **OR** ondansetron (ZOFRAN)  injection 4 mg, 4 mg, Intravenous, Q6H PRN, Burgos, Birrilla, DO  •  sodium chloride 0.9 % flush 10 mL, 10 mL, Intravenous, PRN, Burgos, Birrilla, DO  •  sodium chloride 0.9 % flush 10 mL, 10 mL, Intravenous, Q12H, Burgos, Birrilla, DO, 10 mL at 12/05/21 2132  •  sodium chloride 0.9 % infusion 40 mL, 40 mL, Intravenous, PRN, Burgos, Birrilla, DO      Assessment/Plan     1.  Acute Hypoxic Respiratory Failure due to COVID-19 Pneumonia: CRP <10, but did not do well on oximetry testing.  She appears miserable.  Will increase Decadron to BID and continue to monitor.    2.  Transaminitis: Could be viral in etiology, but also consistent w/ fatty liver.  Will continue to monitor.    Plan for disposition: Predicated on hospital course.    Ben Rodríguez MD  12/06/21  15:01 EST

## 2021-12-06 NOTE — PLAN OF CARE
Goal Outcome Evaluation:  Plan of Care Reviewed With: patient        Progress: no change  Outcome Summary: Patient here for hypoxia due to COVID, on 2liter nasal canula at rest, normally does not wear any oxygen. Walk test revealed she needed 6liter oxygen at exertion, increased decadron to BID. Eating & drinking without nausea. Will montior oxygen levels.

## 2021-12-07 LAB
ALBUMIN SERPL-MCNC: 3.3 G/DL (ref 3.5–5.2)
ALBUMIN/GLOB SERPL: 0.9 G/DL
ALP SERPL-CCNC: 124 U/L (ref 39–117)
ALT SERPL W P-5'-P-CCNC: 53 U/L (ref 1–33)
ANION GAP SERPL CALCULATED.3IONS-SCNC: 9.3 MMOL/L (ref 5–15)
AST SERPL-CCNC: 44 U/L (ref 1–32)
BILIRUB SERPL-MCNC: 0.4 MG/DL (ref 0–1.2)
BUN SERPL-MCNC: 12 MG/DL (ref 6–20)
BUN/CREAT SERPL: 22.6 (ref 7–25)
CALCIUM SPEC-SCNC: 8.7 MG/DL (ref 8.6–10.5)
CHLORIDE SERPL-SCNC: 103 MMOL/L (ref 98–107)
CO2 SERPL-SCNC: 23.7 MMOL/L (ref 22–29)
CREAT SERPL-MCNC: 0.53 MG/DL (ref 0.57–1)
CRP SERPL-MCNC: 1.56 MG/DL (ref 0–0.5)
D DIMER PPP FEU-MCNC: 1.81 MCGFEU/ML (ref 0–0.46)
FERRITIN SERPL-MCNC: 291 NG/ML (ref 13–150)
GFR SERPL CREATININE-BSD FRML MDRD: 128 ML/MIN/1.73
GLOBULIN UR ELPH-MCNC: 3.8 GM/DL
GLUCOSE SERPL-MCNC: 165 MG/DL (ref 65–99)
POTASSIUM SERPL-SCNC: 4.3 MMOL/L (ref 3.5–5.2)
PROCALCITONIN SERPL-MCNC: 0.03 NG/ML (ref 0–0.25)
PROT SERPL-MCNC: 7.1 G/DL (ref 6–8.5)
SODIUM SERPL-SCNC: 136 MMOL/L (ref 136–145)

## 2021-12-07 PROCEDURE — 84145 PROCALCITONIN (PCT): CPT | Performed by: HOSPITALIST

## 2021-12-07 PROCEDURE — 25010000002 ENOXAPARIN PER 10 MG: Performed by: INTERNAL MEDICINE

## 2021-12-07 PROCEDURE — 94799 UNLISTED PULMONARY SVC/PX: CPT

## 2021-12-07 PROCEDURE — 94761 N-INVAS EAR/PLS OXIMETRY MLT: CPT

## 2021-12-07 PROCEDURE — 63710000001 DEXAMETHASONE PER 0.25 MG: Performed by: HOSPITALIST

## 2021-12-07 PROCEDURE — 85379 FIBRIN DEGRADATION QUANT: CPT | Performed by: HOSPITALIST

## 2021-12-07 PROCEDURE — 80053 COMPREHEN METABOLIC PANEL: CPT | Performed by: HOSPITALIST

## 2021-12-07 PROCEDURE — 86140 C-REACTIVE PROTEIN: CPT | Performed by: HOSPITALIST

## 2021-12-07 PROCEDURE — 99232 SBSQ HOSP IP/OBS MODERATE 35: CPT | Performed by: HOSPITALIST

## 2021-12-07 PROCEDURE — 82728 ASSAY OF FERRITIN: CPT | Performed by: HOSPITALIST

## 2021-12-07 RX ADMIN — SODIUM CHLORIDE, PRESERVATIVE FREE 10 ML: 5 INJECTION INTRAVENOUS at 08:45

## 2021-12-07 RX ADMIN — BENZONATATE 200 MG: 100 CAPSULE ORAL at 09:10

## 2021-12-07 RX ADMIN — BENZONATATE 200 MG: 100 CAPSULE ORAL at 20:40

## 2021-12-07 RX ADMIN — GUAIFENESIN SYRUP AND DEXTROMETHORPHAN 5 ML: 100; 10 SYRUP ORAL at 20:40

## 2021-12-07 RX ADMIN — GUAIFENESIN SYRUP AND DEXTROMETHORPHAN 5 ML: 100; 10 SYRUP ORAL at 09:10

## 2021-12-07 RX ADMIN — DEXAMETHASONE 6 MG: 4 TABLET ORAL at 20:40

## 2021-12-07 RX ADMIN — SODIUM CHLORIDE, PRESERVATIVE FREE 10 ML: 5 INJECTION INTRAVENOUS at 20:40

## 2021-12-07 RX ADMIN — DEXAMETHASONE 6 MG: 4 TABLET ORAL at 09:00

## 2021-12-07 RX ADMIN — ENOXAPARIN SODIUM 40 MG: 40 INJECTION SUBCUTANEOUS at 08:45

## 2021-12-07 NOTE — PLAN OF CARE
Goal Outcome Evaluation:  Plan of Care Reviewed With: patient        Progress: improving  Outcome Summary: O2 increased to 3L NC. Tele Al when sleeping 45-55. Phy aware. will continue to monitor.

## 2021-12-07 NOTE — CASE MANAGEMENT/SOCIAL WORK
Discharge Planning Assessment   [place]     Patient Name: Marilee Kraus  MRN: [id number]  Today's Date: 12/7/2021    Admit Date: 12/4/2021     Discharge Needs Assessment     Row Name 12/07/21 1602       Living Environment    Lives With spouse    Current Living Arrangements home/apartment/condo    Duration at Residence 7 years    Potentially Unsafe Housing Conditions --  none    Primary Care Provided by self    Provides Primary Care For no one    Family Caregiver if Needed spouse    Quality of Family Relationships unable to assess; helpful    Able to Return to Prior Arrangements yes       Resource/Environmental Concerns    Resource/Environmental Concerns none    Transportation Concerns car, none       Transition Planning    Patient/Family Anticipates Transition to home with family    Patient/Family Anticipated Services at Transition none    Transportation Anticipated family or friend will provide       Discharge Needs Assessment    Readmission Within the Last 30 Days no previous admission in last 30 days    Current Outpatient/Agency/Support Group --  none    Equipment Currently Used at Home none    Concerns to be Addressed discharge planning    Anticipated Changes Related to Illness none    Equipment Needed After Discharge none    Outpatient/Agency/Support Group Needs --  declined    Discharge Facility/Level of Care Needs --  declined    Provided Post Acute Provider List? Refused    Refused Provider List Comment pt declined    Provided Post Acute Provider Quality & Resource List? Refused    Refused Quality and Resource List Comment pt declined    Current Discharge Risk --  none               Discharge Plan     Row Name 12/07/21 1606       Plan    Plan Discharge home with family    Patient/Family in Agreement with Plan yes    Plan Comments I spoke with the patient at bedside. I introduced myself and explained my role as .  I verified the information on the facesheet and the stated that she uses a  clinic in [place] but would like a PCP in [place]. We discussed her options and she has no preference for PCP.  I called the patients insurance carrier for a list of PCP’s in [place].  The provider will fax a PCP list. The patient uses [facility] in [place].  She is able to afford her medications and can  them up.  The patient lives in single story home for the past 7 years. She lives with her .  There is 1 steps to enter the home and she is able to enter and maneuver around her home with no issues.  She is independent with her ADL’s.   The patient’s  will pick her up at discharge.  She denied having or needing any DME at discharge. I offered the patient information regarding home health and other community resources and she declined the information.  We discussed her potential need for oxygen at discharge and I offered options for DME providers and she is agreeable to [facility].  The patient will discharge home with her family to assist as needed and she is agreeable to that plan.  She denied having and further questions or concerns at this time.  CM will continue to follow for further needs.              Continued Care and Services - Admitted Since 12/4/2021    Coordination has not been started for this encounter.          Demographic Summary     Row Name 12/07/21 1601       General Information    Admission Type inpatient    Arrived From home    Referral Source admission list    Reason for Consult discharge planning    Preferred Language Dutch     Used During This Interaction yes    General Information Comments  Number [id number] [place]       Contact Information    Permission Granted to Share Info With                Functional Status    No documentation.                Psychosocial    No documentation.                Abuse/Neglect    No documentation.                Legal    No documentation.                Substance Abuse    No  documentation.                Patient Forms    No documentation.                   Margarette Shaw RN

## 2021-12-07 NOTE — PROGRESS NOTES
"Hospitalist Team      Patient Care Team:  Provider, No Known as PCP - Shruthi Wilkinson MD (Internal Medicine)      Chief Complaint:  Follow-up Acute Hypoxic Respiratory Failure due to COVID-19    Subjective    Still feels pretty bad.  She is dyspneic with any activity.  Appetite is still poor.  She has pain when she deeply inspires.  She called her boyfriend (who is also unvaccinated) to help her shower.  She reports her O2 dropped to 85%.  Staff had to bump her O2 to help recover.    Objective    Vital Signs  Temp:  [96.7 °F (35.9 °C)-98.4 °F (36.9 °C)] 96.7 °F (35.9 °C)  Heart Rate:  [47-81] 63  Resp:  [18-22] 18  BP: ()/(56-67) 98/64  Oxygen Therapy  SpO2: 93 %  Pulse Oximetry Type: Continuous  Device (Oxygen Therapy): nasal cannula  Flow (L/min): 3}    Flowsheet Rows      First Filed Value   Admission Height 167.6 cm (66\") Documented at 12/04/2021 2237   Admission Weight 68.6 kg (151 lb 4.8 oz) Documented at 12/04/2021 2237          Physical Exam:    General: Ill appearing.      Lungs: Breath sounds are diminished.  No wheeze or rales appreciated.  Respirations are non-labored.  CV: Regular rate and rhythm.  No murmur appreciated.  Radial and pedal pulses are 2+ and symmetric.  Abdomen: Soft and non-tender w/ active bowel sounds.  MSK: No C/C/E.  No asymmetry.  Skin: No evidence of embolic phenomena.  Neuro: CN II-XII grossly intact.  Psych: Flat affect.    Results Review:     I reviewed the patient's new clinical results.    Lab Results (last 24 hours)     Procedure Component Value Units Date/Time    C-reactive Protein [[id number]]  (Abnormal) Collected: 12/07/21 0421    Specimen: Blood Updated: 12/07/21 1143     C-Reactive Protein 1.56 mg/dL     Comprehensive Metabolic Panel [[id number]]  (Abnormal) Collected: 12/07/21 0421    Specimen: Blood Updated: 12/07/21 0538     Glucose 165 mg/dL      BUN 12 mg/dL      Creatinine 0.53 mg/dL      Sodium 136 mmol/L      Potassium 4.3 mmol/L      Chloride " 103 mmol/L      CO2 23.7 mmol/L      Calcium 8.7 mg/dL      Total Protein 7.1 g/dL      Albumin 3.30 g/dL      ALT (SGPT) 53 U/L      AST (SGOT) 44 U/L      Alkaline Phosphatase 124 U/L      Total Bilirubin 0.4 mg/dL      eGFR Non African Amer 128 mL/min/1.73      Globulin 3.8 gm/dL      A/G Ratio 0.9 g/dL      BUN/Creatinine Ratio 22.6     Anion Gap 9.3 mmol/L     Narrative:      GFR Normal >60  Chronic Kidney Disease <60  Kidney Failure <15      Ferritin [[id number]]  (Abnormal) Collected: 12/07/21 0421    Specimen: Blood Updated: 12/07/21 0533     Ferritin 291.00 ng/mL     Narrative:      Results may be falsely decreased if patient taking Biotin.      Procalcitonin [[id number]]  (Normal) Collected: 12/07/21 0421    Specimen: Blood Updated: 12/07/21 0529     Procalcitonin 0.03 ng/mL     Narrative:      Results may be falsely decreased if patient taking Biotin.     D-dimer, Quantitative [[id number]]  (Abnormal) Collected: 12/07/21 0421    Specimen: Blood Updated: 12/07/21 0518     D-Dimer, Quantitative 1.81 MCGFEU/mL     Narrative:      Can be elevated in, but is not diagnostic for deep vein thrombosis (DVT) or pulmonary embolis (PE).  It is also elevated in other medical conditions.  Clinical correlation is required.  The negative cut-off value for the D-Dimer is 0.50 mcg FEU/mL for DVT and PE.            Imaging Results (Last 24 Hours)     ** No results found for the last 24 hours. **            Medication Review:   I have reviewed the patient's current medication list    Current Facility-Administered Medications:   •  acetaminophen (TYLENOL) tablet 650 mg, 650 mg, Oral, Q4H PRN **OR** acetaminophen (TYLENOL) 160 MG/5ML solution 650 mg, 650 mg, Oral, Q4H PRN **OR** acetaminophen (TYLENOL) suppository 650 mg, 650 mg, Rectal, Q4H PRN, Burgos, Birrilla, DO  •  albuterol sulfate HFA (PROVENTIL HFA;VENTOLIN HFA;PROAIR HFA) inhaler 2 puff, 2 puff, Inhalation, Q4H PRN, Shine Burgos DO  •  benzonatate (TESSALON)  capsule 200 mg, 200 mg, Oral, TID PRN, Burgos, Birrilla, DO, 200 mg at 12/07/21 0910  •  dexamethasone (DECADRON) tablet 6 mg, 6 mg, Oral, Q12H, Ben Rodríguez MD, 6 mg at 12/07/21 0900  •  enoxaparin (LOVENOX) syringe 40 mg, 40 mg, Subcutaneous, Q24H, Burgos, Birrilla, DO, 40 mg at 12/07/21 0845  •  guaiFENesin-dextromethorphan (ROBITUSSIN DM) 100-10 MG/5ML syrup 5 mL, 5 mL, Oral, Q4H PRN, Burgos, Birrilla, DO, 5 mL at 12/07/21 0910  •  nitroglycerin (NITROSTAT) SL tablet 0.4 mg, 0.4 mg, Sublingual, Q5 Min PRN, Burgos, Birrilla, DO  •  ondansetron (ZOFRAN) tablet 4 mg, 4 mg, Oral, Q6H PRN **OR** ondansetron (ZOFRAN) injection 4 mg, 4 mg, Intravenous, Q6H PRN, Burgos, Birrilla, DO  •  sodium chloride 0.9 % flush 10 mL, 10 mL, Intravenous, PRN, Burgos, Birrilla, DO  •  sodium chloride 0.9 % flush 10 mL, 10 mL, Intravenous, Q12H, Burgos, Birrilla, DO, 10 mL at 12/07/21 0845  •  sodium chloride 0.9 % infusion 40 mL, 40 mL, Intravenous, PRN, Burgos, Birrilla, DO      Assessment/Plan     1.  Acute Hypoxic Respiratory Failure due to COVID Pneumonia: Continue BID Decadron.  CRP has continued to decrease.  Add I/S for pulmonary toilet.  Continue to monitor on O2.      Plan for disposition: Predicated on hospital course.  Advised her to call our staff for help.  Asked her son and boyfriend to leave as COVID patients cannot have vistors.  Boyfriend was in the bathroom w/ her with the door closed only wearing a mask.  Son was in the room outside the bathroom.  Both are unvaccinated.  I advised both to quarantine.  I also called the [facility] w/ no return call.    Ben Rodríguez MD  12/07/21  18:34 EST

## 2021-12-07 NOTE — PAYOR COMM NOTE
"Jama Tracey (41 y.o. Female)     ATTN: SHON FORBES  RE: INPATIENT AUTH REQUEST, CONVERTED FROM OBSERVATION TO INPATIENT  REF#[id number]  PLS REPLY TO MAYUR GOMEZ [phone] FAX# [phone]              Date of Birth Social Security Number Address Home Phone MRN    1980  [address] [id number] [phone] [id number]    [employer] Marital Status             None        Admission Date Admission Type Admitting Provider Attending Provider Department, Room/Bed    12/4/21 Emergency Shine Burgos DO Maddox, Birrilla, DO [facility] MED SURG, 1401/1    Discharge Date Discharge Disposition Discharge Destination                         Attending Provider: Shine Burgos DO    Allergies: Amoxapine, Amoxicillin    Isolation: Enh Drop/Con   Infection: COVID (confirmed) (11/28/21)   Code Status: CPR   Advance Care Planning Activity    Ht: 167.6 cm (66\")   Wt: 70.8 kg (156 lb)    Admission Cmt: None   Principal Problem: None                Active Insurance as of 12/4/2021     Primary Coverage     Payor Plan Insurance Group Employer/Plan Group    CIG[employer] CIGNA [id number]     Payor Plan Address Payor Plan Phone Number Payor Plan Fax Number Effective Dates    [phone]  1/1/2021 - None Entered    [facility] [id number]       Subscriber Name Subscriber Birth Date Member ID       JAMA TRACEY 1980 [id number]                 Emergency Contacts      (Rel.) Home Phone Work Phone Mobile Phone    patrick mohamud (Significant Other) [phone] -- --    REYES AVILA, FADI (Son) -- -- [phone]    Luis Felipe Kraus -- -- [phone]               History & Physical      Shine Burgos DO at 12/05/21 0143          [facility] HOSPITALIST     Provider, No Known    CHIEF COMPLAINT:     Shortness of breath    HISTORY OF PRESENT ILLNESS:    Patient is a very pleasant 41 year old  female who " presented to the ED complaining of moderate - severe shortness of breath that has been worsening over the past 10 days. Patient states she initially had a cough and body aches and test positive for COVID 19 on 2021. She states she has been progressively worsening and decided to come to the ED tonight. She has a productive cough with white and brown sputum. She has also lost taste and smell. She reports she has not been vaccinated against COVID 19.        services were used for this exam.     Past Medical History:   Diagnosis Date   • Abnormal Pap smear of cervix 2018    ASCUS + HPV =- no follow up   • Cervical dysplasia     RUMA 2 on bx, neg LEEP   • S/P LEEP 2020     Past Surgical History:   Procedure Laterality Date   • CERVICAL BIOPSY  W/ LOOP ELECTRODE EXCISION     •  SECTION      placenta previa   •  SECTION     •  SECTION     • ENDOSCOPY N/A 2021    Procedure: ESOPHAGOGASTRODUODENOSCOPY with biopsies;  Surgeon: Lee Tubbs MD;  Location: [facility];  Service: Gastroenterology;  Laterality: N/A;  Reflux  Biopsies: gastric, distal esophagus   • LEEP N/A 2020    Procedure: LOOP ELECTROCAUTERY EXCISION PROCEDURE;  Surgeon: Sarah Berger MD;  Location: [facility];  Service: Obstetrics/Gynecology;  Laterality: N/A;     Family History   Problem Relation Age of Onset   • Breast cancer Neg Hx    • Ovarian cancer Neg Hx    • Uterine cancer Neg Hx    • Colon cancer Neg Hx    • Prostate cancer Neg Hx    • Deep vein thrombosis Neg Hx      Social History     Tobacco Use   • Smoking status: Never Smoker   • Smokeless tobacco: Never Used   Vaping Use   • Vaping Use: Never used   Substance Use Topics   • Alcohol use: Never   • Drug use: Never     (Not in a hospital admission)    Allergies:  Amoxapine and Amoxicillin      There is no immunization history on file for this patient.        REVIEW OF SYSTEMS:    Please see the above history  "of present illness for pertinent positives and negatives.  The remainder of the patient's systems have been reviewed and are negative.     Vital Signs  Temp:  [98.6 °F (37 °C)] 98.6 °F (37 °C)  Heart Rate:  [78-98] 78  Resp:  [20] 20  BP: (91-98)/(64-81) 91/65    Flowsheet Rows      First Filed Value   Admission Height 167.6 cm (66\") Documented at 12/04/2021 2237   Admission Weight 68.6 kg (151 lb 4.8 oz) Documented at 12/04/2021 2237             Physical Exam:    Physical Exam  Vitals reviewed.   Constitutional:       General: She is not in acute distress.     Appearance: She is normal weight.   HENT:      Head: Normocephalic and atraumatic.      Mouth/Throat:      Mouth: Mucous membranes are moist.   Eyes:      General: No scleral icterus.     Pupils: Pupils are equal, round, and reactive to light.   Cardiovascular:      Rate and Rhythm: Normal rate and regular rhythm.      Heart sounds: No murmur heard.      Pulmonary:      Effort: Pulmonary effort is normal.      Breath sounds: Rhonchi and rales present. No wheezing.   Abdominal:      General: Bowel sounds are normal. There is no distension.      Palpations: Abdomen is soft.      Tenderness: There is no abdominal tenderness.   Musculoskeletal:      Right lower leg: No edema.      Left lower leg: No edema.   Skin:     General: Skin is warm and dry.      Findings: No rash.   Neurological:      General: No focal deficit present.      Mental Status: She is alert.      Comments: Moves all extremities    Psychiatric:         Mood and Affect: Mood normal.         Behavior: Behavior normal.               Results Review:    I reviewed the patient's new clinical results.  Lab Results (most recent)     Procedure Component Value Units Date/Time    Procalcitonin [[id number]]  (Normal) Collected: 12/04/21 2327    Specimen: Blood Updated: 12/05/21 0003     Procalcitonin 0.04 ng/mL     Narrative:      Results may be falsely decreased if patient taking Biotin.     Comprehensive " Metabolic Panel [[id number]]  (Abnormal) Collected: 12/04/21 2327    Specimen: Blood Updated: 12/05/21 0001     Glucose 103 mg/dL      BUN 9 mg/dL      Creatinine 0.63 mg/dL      Sodium 138 mmol/L      Potassium 3.6 mmol/L      Chloride 101 mmol/L      CO2 24.2 mmol/L      Calcium 8.6 mg/dL      Total Protein 7.6 g/dL      Albumin 3.30 g/dL      ALT (SGPT) 60 U/L      AST (SGOT) 61 U/L      Alkaline Phosphatase 126 U/L      Total Bilirubin 0.5 mg/dL      eGFR Non African Amer 105 mL/min/1.73      Globulin 4.3 gm/dL      A/G Ratio 0.8 g/dL      BUN/Creatinine Ratio 14.3     Anion Gap 12.8 mmol/L     Narrative:      GFR Normal >60  Chronic Kidney Disease <60  Kidney Failure <15      Lactate Dehydrogenase [[id number]]  (Abnormal) Collected: 12/04/21 2327    Specimen: Blood Updated: 12/05/21 0001      U/L     Ferritin [[id number]]  (Abnormal) Collected: 12/04/21 2327    Specimen: Blood Updated: 12/05/21 0001     Ferritin 393.00 ng/mL     Narrative:      Results may be falsely decreased if patient taking Biotin.      Blood Gas, Arterial - [[id number]]  (Abnormal) Collected: 12/04/21 2345    Specimen: Arterial Blood Updated: 12/04/21 2350     Site Right Radial     Mannie's Test Positive     pH, Arterial 7.471 pH units      Comment: 83 Value above reference range        pCO2, Arterial 39.0 mm Hg      pO2, Arterial 72.2 mm Hg      Comment: 84 Value below reference range        HCO3, Arterial 28.4 mmol/L      Comment: 83 Value above reference range        Base Excess, Arterial 4.5 mmol/L      Comment: 83 Value above reference range        O2 Saturation, Arterial 96.2 %      Hemoglobin, Blood Gas 13.9 g/dL      Temperature 37.0 C      Barometric Pressure for Blood Gas 744 mmHg      Modality Nasal Cannula     Flow Rate 2.0 lpm      Ventilator Mode NA     Collected by JENNY     Comment: Meter: [id number]     :  [id number]        pCO2, Temperature Corrected 39.0 mm Hg      pH, Temp Corrected 7.471 pH Units       pO2, Temperature Corrected 72.2 mm Hg     hCG, Serum, Qualitative [[id number]]  (Normal) Collected: 12/04/21 2327    Specimen: Blood Updated: 12/04/21 2350     HCG Qualitative Negative    Lactic Acid, Plasma [[id number]]  (Normal) Collected: 12/04/21 2327    Specimen: Blood Updated: 12/04/21 2348     Lactate 1.3 mmol/L     CBC & Differential [[id number]]  (Abnormal) Collected: 12/04/21 2327    Specimen: Blood Updated: 12/04/21 2336    Narrative:      The following orders were created for panel order CBC & Differential.  Procedure                               Abnormality         Status                     ---------                               -----------         ------                     CBC Auto Differential[[id number]]        Abnormal            Final result                 Please view results for these tests on the individual orders.    CBC Auto Differential [[id number]]  (Abnormal) Collected: 12/04/21 2327    Specimen: Blood Updated: 12/04/21 2336     WBC 8.56 10*3/mm3      RBC 4.75 10*6/mm3      Hemoglobin 14.2 g/dL      Hematocrit 43.6 %      MCV 91.8 fL      MCH 29.9 pg      MCHC 32.6 g/dL      RDW 12.1 %      RDW-SD 41.0 fl      MPV 10.1 fL      Platelets 319 10*3/mm3      Neutrophil % 77.8 %      Lymphocyte % 12.0 %      Monocyte % 9.2 %      Eosinophil % 0.6 %      Basophil % 0.2 %      Immature Grans % 0.2 %      Neutrophils, Absolute 6.65 10*3/mm3      Lymphocytes, Absolute 1.03 10*3/mm3      Monocytes, Absolute 0.79 10*3/mm3      Eosinophils, Absolute 0.05 10*3/mm3      Basophils, Absolute 0.02 10*3/mm3      Immature Grans, Absolute 0.02 10*3/mm3      nRBC 0.0 /100 WBC           Imaging Results (Most Recent)     Procedure Component Value Units Date/Time    XR Chest AP [[id number]] Collected: 12/05/21 0023     Updated: 12/05/21 0026    Narrative:      CR Chest 1 Vw    INDICATION:   Cough and fever and shortness of air today. Covid positive patient     COMPARISON:    May 20    FINDINGS:  Portable AP view(s)  of the chest.  There are numerous bilateral patchy infiltrates suggesting Covid 19 pneumonia. Heart size is normal. Bones are normal      Impression:      Patchy bilateral infiltrates consistent with Covid 19 pneumonia    Signer Name: Tirso Locke MD   Signed: 12/5/2021 12:23 AM   Workstation Name: [email or web address]    [facility]        CXR personally reviewed and shows bilateral infiltrates     ECG/EMG Results (most recent)     None        Telemetry reviewed and shows NSR    Assessment/Plan   Active Hospital Problems    Diagnosis  POA   • Pneumonia due to COVID-19 virus [U07.1, J12.82]  Yes      Resolved Hospital Problems   No resolved problems to display.       Acute Hypoxic Respiratory failure d/t COVID 19 pneumonia  - supplemental oxygen  - pro-rodriguez negative   - add decadron 6mg daily x 10 days   - albuterol prn  - tessalon pearls and robitussin prn    Mild Transaminitis  - likely d/t COVID 19   - monitor with routine labs     Borderline hypotension   - add IV fluids NS   - monitor with routine vital signs and make adjustments as needed    DVT Prophylaxis  - enoxaparin       I discussed the patient's findings and my recommendations with patient.       This patient has been examined wearing appropriate Personal Protective Equipment  12/05/21      Shine Burgos DO  12/05/21  01:43 EST              Electronically signed by Shine Burgos DO at 12/05/21 0155          Operative/Procedure Notes (last 72 hours)      Nuha Seals, CRT at 12/06/21 1221      Procedure Orders    1. Walking Oximetry [[id number]] ordered by Ben Rodríguez MD at 12/06/21 1121               Exercise Oximetry    Patient Name:Marilee Kraus   MRN: [id number]   Date: 12/06/21             ROOM AIR BASELINE   SpO2% 84   Heart Rate 74   Blood Pressure      EXERCISE ON ROOM AIR SpO2% EXERCISE ON O2 @ 2 LPM SpO2%   1 MINUTE  1 MINUTE sitting on side of bed  84   2 MINUTES  2 MINUTES    3 MINUTES  3 MINUTES    4  "MINUTES  4 MINUTES    5 MINUTES  5 MINUTES    6 MINUTES  6 MINUTES               Distance Walked   Distance Walked   Dyspnea (Erin Scale)   Dyspnea (Erin Scale)   Fatigue (Erin Scale)   Fatigue (Erin Scale)   SpO2% Post Exercise   SpO2% Post Exercise   HR Post Exercise   HR Post Exercise   Time to Recovery   Time to Recovery     Comments: At rest on 2lpm, SpO2 is 91%.  Sitting up to the side of the bed sats dropped and required 4lpm for a SpO2 of 90%.  Upon standing and walking 10ft, Pt required 6lpm to maintain a SpO2 of 89%.  Walk ended and Dr Rodríguez notified    Electronically signed by Nuha Seals CRT at 12/06/21 1229          Physician Progress Notes (last 72 hours)      Ben Rodríguez MD at 12/06/21 1501          Hospitalist Team      Patient Care Team:  Provider, No Known as PCP - Shruthi Wilkinson MD (Internal Medicine)      Chief Complaint: Follow-up Acute Hypoxic Respiratory Failure due to COVID-19 Pneumonia    Subjective    Feels pretty lousy.  She denies chest pain, but has trouble breathing w/ any exertion.  Appetite is okay, but she is tolerating only a little PO.    Objective    Vital Signs  Temp:  [97.5 °F (36.4 °C)-98.8 °F (37.1 °C)] 97.5 °F (36.4 °C)  Heart Rate:  [61-90] 74  Resp:  [20-34] 26  BP: ()/(62-66) 102/64  Oxygen Therapy  SpO2: (!) 89 %  Pulse Oximetry Type: Continuous  Device (Oxygen Therapy): nasal cannula  Flow (L/min): 2}    Flowsheet Rows      First Filed Value   Admission Height 167.6 cm (66\") Documented at 12/04/2021 2237   Admission Weight 68.6 kg (151 lb 4.8 oz) Documented at 12/04/2021 2237          Physical Exam:    General: Appears stated age in moderate distress.  Appears toxic.  Lungs: Breath sounds are diminished throughout all fields.  Respirations are non-labored.  CV: Regular rate and rhythm.  Radial and pedal pulses are 2+ and symmetric.  Abdomen: Soft and non-tender.  Bowel sounds are diminished.  MSK: No C/C/E.  No asymmetry.  Skin: No " evidence of embolic phenomena.  Neuro: CN II-XII grossly intact.  Psych: Flat affect.    Results Review:     I reviewed the patient's new clinical results.    Lab Results (last 24 hours)     Procedure Component Value Units Date/Time    C-reactive Protein [[id number]]  (Abnormal) Collected: 12/06/21 0333    Specimen: Blood Updated: 12/06/21 1119     C-Reactive Protein 4.64 mg/dL     Comprehensive Metabolic Panel [[id number]]  (Abnormal) Collected: 12/06/21 0332    Specimen: Blood Updated: 12/06/21 0510     Glucose 117 mg/dL      BUN 7 mg/dL      Creatinine 0.41 mg/dL      Sodium 135 mmol/L      Potassium 3.9 mmol/L      Chloride 104 mmol/L      CO2 19.5 mmol/L      Calcium 8.8 mg/dL      Total Protein 6.9 g/dL      Albumin 2.90 g/dL      ALT (SGPT) 47 U/L      AST (SGOT) 43 U/L      Alkaline Phosphatase 108 U/L      Total Bilirubin 0.4 mg/dL      eGFR Non African Amer >150 mL/min/1.73      Globulin 4.0 gm/dL      A/G Ratio 0.7 g/dL      BUN/Creatinine Ratio 17.1     Anion Gap 11.5 mmol/L     Narrative:      GFR Normal >60  Chronic Kidney Disease <60  Kidney Failure <15      CBC (No Diff) [[id number]]  (Abnormal) Collected: 12/06/21 0332    Specimen: Blood Updated: 12/06/21 0424     WBC 4.06 10*3/mm3      RBC 4.37 10*6/mm3      Hemoglobin 12.8 g/dL      Hematocrit 40.0 %      MCV 91.5 fL      MCH 29.3 pg      MCHC 32.0 g/dL      RDW 11.9 %      RDW-SD 40.5 fl      MPV 11.1 fL      Platelets 365 10*3/mm3           Imaging Results (Last 24 Hours)     ** No results found for the last 24 hours. **          Medication Review:   I have reviewed the patient's current medication list    Current Facility-Administered Medications:   •  acetaminophen (TYLENOL) tablet 650 mg, 650 mg, Oral, Q4H PRN **OR** acetaminophen (TYLENOL) 160 MG/5ML solution 650 mg, 650 mg, Oral, Q4H PRN **OR** acetaminophen (TYLENOL) suppository 650 mg, 650 mg, Rectal, Q4H PRN, Burgos, Birrilla, DO  •  albuterol sulfate HFA (PROVENTIL HFA;VENTOLIN HFA;PROAIR  HFA) inhaler 2 puff, 2 puff, Inhalation, Q4H PRN, Burgos, Birrilla, DO  •  benzonatate (TESSALON) capsule 200 mg, 200 mg, Oral, TID PRN, Burgos, Birrilla, DO, 200 mg at 12/05/21 1540  •  dexamethasone (DECADRON) tablet 6 mg, 6 mg, Oral, Daily With Breakfast, Burgos, Birrilla, DO, 6 mg at 12/06/21 0814  •  enoxaparin (LOVENOX) syringe 40 mg, 40 mg, Subcutaneous, Q24H, Burgos, Birrilla, DO, 40 mg at 12/06/21 0113  •  guaiFENesin-dextromethorphan (ROBITUSSIN DM) 100-10 MG/5ML syrup 5 mL, 5 mL, Oral, Q4H PRN, Burgos, Birrilla, DO, 5 mL at 12/05/21 1540  •  nitroglycerin (NITROSTAT) SL tablet 0.4 mg, 0.4 mg, Sublingual, Q5 Min PRN, Burgos, Birrilla, DO  •  ondansetron (ZOFRAN) tablet 4 mg, 4 mg, Oral, Q6H PRN **OR** ondansetron (ZOFRAN) injection 4 mg, 4 mg, Intravenous, Q6H PRN, Burgos, Birrilla, DO  •  sodium chloride 0.9 % flush 10 mL, 10 mL, Intravenous, PRN, Burgos, Birrilla, DO  •  sodium chloride 0.9 % flush 10 mL, 10 mL, Intravenous, Q12H, Burgos, Birrilla, DO, 10 mL at 12/05/21 2132  •  sodium chloride 0.9 % infusion 40 mL, 40 mL, Intravenous, PRN, Burgos, Birrilla, DO      Assessment/Plan     1.  Acute Hypoxic Respiratory Failure due to COVID-19 Pneumonia: CRP <10, but did not do well on oximetry testing.  She appears miserable.  Will increase Decadron to BID and continue to monitor.    2.  Transaminitis: Could be viral in etiology, but also consistent w/ fatty liver.  Will continue to monitor.    Plan for disposition: Predicated on hospital course.    Ben T. Rodríguez, MD  12/06/21  15:01 EST    Electronically signed by Ben Rodríguez MD at 12/06/21 1542     Chucky Ruiz DO at 12/05/21 1423        Chart reviewed.  Discussed with nurse she is stable although remains on oxygen.  Blood pressure appears to be improving.  - will continue current care for Covid with oxygen supplementation and supportive care     Electronically signed by Chucky Ruiz DO at 12/05/21 6488       Consult Notes (last 72 hours)  Notes  from 12/04/21 1552 through 12/07/21 1552   No notes of this type exist for this encounter.

## 2021-12-08 LAB
AMPHET+METHAMPHET UR QL: NEGATIVE
AMPHETAMINES UR QL: NEGATIVE
BARBITURATES UR QL SCN: NEGATIVE
BENZODIAZ UR QL SCN: NEGATIVE
BUPRENORPHINE SERPL-MCNC: NEGATIVE NG/ML
CANNABINOIDS SERPL QL: NEGATIVE
COCAINE UR QL: NEGATIVE
D DIMER PPP FEU-MCNC: 1.19 MCGFEU/ML (ref 0–0.46)
METHADONE UR QL SCN: NEGATIVE
OPIATES UR QL: NEGATIVE
OXYCODONE UR QL SCN: NEGATIVE
PCP UR QL SCN: NEGATIVE
PROPOXYPH UR QL: NEGATIVE
TRICYCLICS UR QL SCN: NEGATIVE

## 2021-12-08 PROCEDURE — 25010000002 ENOXAPARIN PER 10 MG: Performed by: INTERNAL MEDICINE

## 2021-12-08 PROCEDURE — 99232 SBSQ HOSP IP/OBS MODERATE 35: CPT | Performed by: HOSPITALIST

## 2021-12-08 PROCEDURE — 94761 N-INVAS EAR/PLS OXIMETRY MLT: CPT

## 2021-12-08 PROCEDURE — 94799 UNLISTED PULMONARY SVC/PX: CPT

## 2021-12-08 PROCEDURE — 63710000001 DEXAMETHASONE PER 0.25 MG: Performed by: HOSPITALIST

## 2021-12-08 PROCEDURE — 80306 DRUG TEST PRSMV INSTRMNT: CPT | Performed by: HOSPITALIST

## 2021-12-08 PROCEDURE — 85379 FIBRIN DEGRADATION QUANT: CPT | Performed by: HOSPITALIST

## 2021-12-08 RX ORDER — PANTOPRAZOLE SODIUM 40 MG/1
40 TABLET, DELAYED RELEASE ORAL
Status: DISCONTINUED | OUTPATIENT
Start: 2021-12-09 | End: 2021-12-11 | Stop reason: HOSPADM

## 2021-12-08 RX ADMIN — ENOXAPARIN SODIUM 40 MG: 40 INJECTION SUBCUTANEOUS at 09:25

## 2021-12-08 RX ADMIN — GUAIFENESIN SYRUP AND DEXTROMETHORPHAN 5 ML: 100; 10 SYRUP ORAL at 22:54

## 2021-12-08 RX ADMIN — SODIUM CHLORIDE, PRESERVATIVE FREE 10 ML: 5 INJECTION INTRAVENOUS at 09:26

## 2021-12-08 RX ADMIN — SODIUM CHLORIDE, PRESERVATIVE FREE 10 ML: 5 INJECTION INTRAVENOUS at 22:57

## 2021-12-08 RX ADMIN — ACETAMINOPHEN 650 MG: 325 TABLET, FILM COATED ORAL at 23:00

## 2021-12-08 RX ADMIN — DEXAMETHASONE 6 MG: 4 TABLET ORAL at 22:54

## 2021-12-08 RX ADMIN — DEXAMETHASONE 6 MG: 4 TABLET ORAL at 09:25

## 2021-12-08 RX ADMIN — BENZONATATE 200 MG: 100 CAPSULE ORAL at 22:54

## 2021-12-08 NOTE — PLAN OF CARE
Goal Outcome Evaluation:  Plan of Care Reviewed With: patient        Progress: improving  Outcome Summary: Patient feeling somewhat better today, still requring 3liter oxygen at rest & very short of breath on exertion. Patient took shower this afternoon without staff knowing, son & significant other in room & told they can not be here. Patient very sorry & didnt know they could not come. Patient anticipate home at discharge soon.

## 2021-12-08 NOTE — PLAN OF CARE
Goal Outcome Evaluation:  Plan of Care Reviewed With: patient        Progress: improving  Outcome Summary: Pt improving. sitting in chair. continues O2. Resting well through out the night

## 2021-12-08 NOTE — PLAN OF CARE
Goal Outcome Evaluation:  Plan of Care Reviewed With: patient        Progress: improving  Outcome Summary: Pt up ad arlene in room, voiding w/o difficulty. Tolerating diet. Telemetry noted NSR/NSB. Oxygen remains at 3L/NC.

## 2021-12-09 LAB
ALBUMIN SERPL-MCNC: 3.5 G/DL (ref 3.5–5.2)
ALBUMIN/GLOB SERPL: 0.9 G/DL
ALP SERPL-CCNC: 109 U/L (ref 39–117)
ALT SERPL W P-5'-P-CCNC: 67 U/L (ref 1–33)
ANION GAP SERPL CALCULATED.3IONS-SCNC: 9.9 MMOL/L (ref 5–15)
AST SERPL-CCNC: 54 U/L (ref 1–32)
BILIRUB SERPL-MCNC: 0.5 MG/DL (ref 0–1.2)
BUN SERPL-MCNC: 17 MG/DL (ref 6–20)
BUN/CREAT SERPL: 28.8 (ref 7–25)
CALCIUM SPEC-SCNC: 8.7 MG/DL (ref 8.6–10.5)
CHLORIDE SERPL-SCNC: 102 MMOL/L (ref 98–107)
CO2 SERPL-SCNC: 24.1 MMOL/L (ref 22–29)
CREAT SERPL-MCNC: 0.59 MG/DL (ref 0.57–1)
CRP SERPL-MCNC: 0.44 MG/DL (ref 0–0.5)
FERRITIN SERPL-MCNC: 244 NG/ML (ref 13–150)
GFR SERPL CREATININE-BSD FRML MDRD: 113 ML/MIN/1.73
GLOBULIN UR ELPH-MCNC: 3.8 GM/DL
GLUCOSE SERPL-MCNC: 134 MG/DL (ref 65–99)
POTASSIUM SERPL-SCNC: 4.3 MMOL/L (ref 3.5–5.2)
PROCALCITONIN SERPL-MCNC: 0.03 NG/ML (ref 0–0.25)
PROT SERPL-MCNC: 7.3 G/DL (ref 6–8.5)
SODIUM SERPL-SCNC: 136 MMOL/L (ref 136–145)

## 2021-12-09 PROCEDURE — 63710000001 DEXAMETHASONE PER 0.25 MG: Performed by: HOSPITALIST

## 2021-12-09 PROCEDURE — 25010000002 ENOXAPARIN PER 10 MG: Performed by: INTERNAL MEDICINE

## 2021-12-09 PROCEDURE — 94799 UNLISTED PULMONARY SVC/PX: CPT

## 2021-12-09 PROCEDURE — 80053 COMPREHEN METABOLIC PANEL: CPT | Performed by: HOSPITALIST

## 2021-12-09 PROCEDURE — 82728 ASSAY OF FERRITIN: CPT | Performed by: HOSPITALIST

## 2021-12-09 PROCEDURE — 94761 N-INVAS EAR/PLS OXIMETRY MLT: CPT

## 2021-12-09 PROCEDURE — 84145 PROCALCITONIN (PCT): CPT | Performed by: HOSPITALIST

## 2021-12-09 PROCEDURE — 99232 SBSQ HOSP IP/OBS MODERATE 35: CPT | Performed by: HOSPITALIST

## 2021-12-09 PROCEDURE — 86140 C-REACTIVE PROTEIN: CPT | Performed by: HOSPITALIST

## 2021-12-09 RX ADMIN — ENOXAPARIN SODIUM 40 MG: 40 INJECTION SUBCUTANEOUS at 09:41

## 2021-12-09 RX ADMIN — DEXAMETHASONE 6 MG: 4 TABLET ORAL at 20:33

## 2021-12-09 RX ADMIN — GUAIFENESIN SYRUP AND DEXTROMETHORPHAN 5 ML: 100; 10 SYRUP ORAL at 21:46

## 2021-12-09 RX ADMIN — DEXAMETHASONE 6 MG: 4 TABLET ORAL at 09:41

## 2021-12-09 RX ADMIN — PANTOPRAZOLE SODIUM 40 MG: 40 TABLET, DELAYED RELEASE ORAL at 06:59

## 2021-12-09 RX ADMIN — BENZONATATE 200 MG: 100 CAPSULE ORAL at 09:41

## 2021-12-09 RX ADMIN — SODIUM CHLORIDE, PRESERVATIVE FREE 10 ML: 5 INJECTION INTRAVENOUS at 20:33

## 2021-12-09 RX ADMIN — ACETAMINOPHEN 650 MG: 325 TABLET, FILM COATED ORAL at 21:46

## 2021-12-09 RX ADMIN — SODIUM CHLORIDE, PRESERVATIVE FREE 10 ML: 5 INJECTION INTRAVENOUS at 09:42

## 2021-12-09 RX ADMIN — GUAIFENESIN SYRUP AND DEXTROMETHORPHAN 5 ML: 100; 10 SYRUP ORAL at 09:40

## 2021-12-09 RX ADMIN — BENZONATATE 200 MG: 100 CAPSULE ORAL at 21:46

## 2021-12-09 NOTE — CASE MANAGEMENT/SOCIAL WORK
Continued Stay Note  VINCENT Kate     Patient Name: Marilee Kraus  MRN: [id number]  Today's Date: 12/9/2021    Admit Date: 12/4/2021     Discharge Plan     Row Name 12/09/21 1616       Plan    Plan Discharge home with family    Plan Comments The patient in room, continues to use oxygen.  Chart reviewed will continue to follow.               Discharge Codes    No documentation.                     Margarette Shaw RN

## 2021-12-09 NOTE — PLAN OF CARE
Problem: Adult Inpatient Plan of Care  Goal: Plan of Care Review  Outcome: Ongoing, Progressing  Flowsheets (Taken 12/9/2021 1843)  Progress: no change  Plan of Care Reviewed With: patient  Outcome Summary: remains on 2L NC rested in bed today. On PO Decadron. Has not been eating well. Will monitor   Goal Outcome Evaluation:  Plan of Care Reviewed With: patient        Progress: no change  Outcome Summary: remains on 2L NC rested in bed today. On PO Decadron. Has not been eating well. Will monitor

## 2021-12-09 NOTE — PROGRESS NOTES
"Hospitalist Team      Patient Care Team:  Provider, No Known as PCP - Shruthi Wilkinson MD (Internal Medicine)      Chief Complaint: Follow-up Acute Hypoxic Respiratory Failure due to COVID-19 Pneumonia    Subjective    Feels a little better today.  She is tolerating a little PO, but staff reports she is just picking at her plate.  The I/S exercises is increasing her cough.  She reports her boyfriend and son remain symptoms free.    Objective    Vital Signs  Temp:  [97.7 °F (36.5 °C)-98.6 °F (37 °C)] 97.7 °F (36.5 °C)  Heart Rate:  [74-80] 80  Resp:  [20-24] 20  BP: (90-97)/(53-65) 90/53  Oxygen Therapy  SpO2: 91 %  Pulse Oximetry Type: Continuous  Device (Oxygen Therapy): nasal cannula  Flow (L/min): 2}    Flowsheet Rows      First Filed Value   Admission Height 167.6 cm (66\") Documented at 12/04/2021 2237   Admission Weight 68.6 kg (151 lb 4.8 oz) Documented at 12/04/2021 2237          Physical Exam:    General: Appears in no acute distress.  Lungs: Diminished throughout all fields.  Breathing is non-labored.  CV: Regular rate and rhythm.  Radial pulses are 2+ and symmetric.  Abdomen: Soft and non-tender w/ active bowel sounds.  MSK: No C/C/E.  Skin: No evidence of embolic phenomena  Neuro: CN II-XII grossly intact.  Psych: Pleasant affect.    Results Review:     I reviewed the patient's new clinical results.    Lab Results (last 24 hours)     Procedure Component Value Units Date/Time    Urine Drug Screen - Urine, Clean Catch [[id number]]  (Normal) Collected: 12/08/21 1521    Specimen: Urine, Clean Catch Updated: 12/08/21 1539     THC, Screen, Urine Negative     Phencyclidine (PCP), Urine Negative     Cocaine Screen, Urine Negative     Methamphetamine, Ur Negative     Opiate Screen Negative     Amphetamine Screen, Urine Negative     Benzodiazepine Screen, Urine Negative     Tricyclic Antidepressants Screen Negative     Methadone Screen, Urine Negative     Barbiturates Screen, Urine Negative     " Oxycodone Screen, Urine Negative     Propoxyphene Screen Negative     Buprenorphine, Screen, Urine Negative    Narrative:      Urine drug screen results are to be used for medical purposes only.  They are not to be used for legal purposes such as employment testing.  Negative results do not necessarily mean the complete absence of a subtance, but rather that the result is less than the cutoff for that substance.  Positive results are unconfirmed and considered Preliminary Positive.  [facility] does not automatically confirm Postitive Unconfirmed results.  The physician may request (order) an Unconfirmed Positive result to be sent out for confirmation.      Negative Thresholds for Drugs Screened:    THC screen, urine                          50 ng/ml  Phenycyclidine (PCP), urine                25 ng/ml  Cocaine screen, urine                     150 ng/ml  Methamphetamine, urine                    500 ng/ml  Opiate screen, urine                      100 ng/ml  Amphetamine screen, urine                 500 ng/ml  Benzodiazepine screen, urine              150 ng/ml  Tricyclic Antidepressants screen, urine   300 ng/ml  Methadone screen, urine                   200 ng/ml  Barbiturates screen, urine                200 ng/ml  Oxycodone screen, urine                   100 ng/ml  Propoxyphene screen, urine                300 ng/ml  Buprenorphine screen, urine                10 ng/ml          Imaging Results (Last 24 Hours)     ** No results found for the last 24 hours. **            Medication Review:   I have reviewed the patient's current medication list    Current Facility-Administered Medications:   •  acetaminophen (TYLENOL) tablet 650 mg, 650 mg, Oral, Q4H PRN **OR** acetaminophen (TYLENOL) 160 MG/5ML solution 650 mg, 650 mg, Oral, Q4H PRN **OR** acetaminophen (TYLENOL) suppository 650 mg, 650 mg, Rectal, Q4H PRN, Shine Burgos, DO  •  albuterol sulfate HFA (PROVENTIL HFA;VENTOLIN HFA;PROAIR HFA)  inhaler 2 puff, 2 puff, Inhalation, Q4H PRN, Burgos, Birrilla, DO  •  benzonatate (TESSALON) capsule 200 mg, 200 mg, Oral, TID PRN, Burgos, Birrilla, DO, 200 mg at 12/07/21 2040  •  dexamethasone (DECADRON) tablet 6 mg, 6 mg, Oral, Q12H, Ben Rodríguez MD, 6 mg at 12/08/21 0925  •  enoxaparin (LOVENOX) syringe 40 mg, 40 mg, Subcutaneous, Q24H, Burgos, Birrilla, DO, 40 mg at 12/08/21 0925  •  guaiFENesin-dextromethorphan (ROBITUSSIN DM) 100-10 MG/5ML syrup 5 mL, 5 mL, Oral, Q4H PRN, Burgos, Birrilla, DO, 5 mL at 12/07/21 2040  •  nitroglycerin (NITROSTAT) SL tablet 0.4 mg, 0.4 mg, Sublingual, Q5 Min PRN, Burgos, Birrilla, DO  •  ondansetron (ZOFRAN) tablet 4 mg, 4 mg, Oral, Q6H PRN **OR** ondansetron (ZOFRAN) injection 4 mg, 4 mg, Intravenous, Q6H PRN, Burgos, Birrilla, DO  •  sodium chloride 0.9 % flush 10 mL, 10 mL, Intravenous, PRN, Burgos, Birrilla, DO  •  sodium chloride 0.9 % flush 10 mL, 10 mL, Intravenous, Q12H, Burgos, Birrilla, DO, 10 mL at 12/08/21 0926  •  sodium chloride 0.9 % infusion 40 mL, 40 mL, Intravenous, PRN, Burgos, Birrilla, DO      Assessment/Plan     1.  Acute Hypoxic Respiratory Failure due to COVID-19 Pneumonia: Appears clinically stable.  Markers will repeat in the morning.     2.  Transaminitis:  Check CMP in AM.    Plan for disposition: Predicated on hospital course.    Ben Rodríguez MD  12/08/21  19:39 EST

## 2021-12-09 NOTE — PROGRESS NOTES
"Hospitalist Team      Patient Care Team:  Provider, No Known as PCP - Shruthi Wilkinson MD (Internal Medicine)      Chief Complaint: Follow-up Acute Hypoxic Respiratory Failure due to COVID-19    Subjective    Doing well.  She reports she is quite thirsty and had a friend bring in some Dasani water which she started to drink.    Objective    Vital Signs  Temp:  [97.7 °F (36.5 °C)-98.2 °F (36.8 °C)] 98.2 °F (36.8 °C)  Heart Rate:  [71-80] 71  Resp:  [18-20] 20  BP: ()/(53-65) 90/60  Oxygen Therapy  SpO2: 92 %  Pulse Oximetry Type: Continuous  Device (Oxygen Therapy): nasal cannula  Flow (L/min): 2}  Flowsheet Rows      First Filed Value   Admission Height 167.6 cm (66\") Documented at 12/04/2021 2237   Admission Weight 68.6 kg (151 lb 4.8 oz) Documented at 12/04/2021 2237        Physical Exam:    General: No acute distress.  Lungs: Diminished throughout all fields.  Breathing is no labored.  CV: Regular rate and rhythm.  No murmur appreciated.  Radial and pedal pulses are 2+ and symmetric.  Abdomen: Soft, and non-tender w/ active bowel sounds.  MSK: No C/C/E.  No asymmetry.  Skin: No evidence of embolic phenomena.  Neuro: CN II-XII grossly intact.  Psych: Normal affect.    Results Review:     I reviewed the patient's new clinical results.    Lab Results (last 24 hours)     Procedure Component Value Units Date/Time    C-reactive Protein [[id number]]  (Normal) Collected: 12/09/21 0316    Specimen: Blood Updated: 12/09/21 1216     C-Reactive Protein 0.44 mg/dL     Procalcitonin [[id number]]  (Normal) Collected: 12/09/21 0316    Specimen: Blood Updated: 12/09/21 0545     Procalcitonin 0.03 ng/mL     Narrative:      Results may be falsely decreased if patient taking Biotin.     Comprehensive Metabolic Panel [[id number]]  (Abnormal) Collected: 12/09/21 0316    Specimen: Blood Updated: 12/09/21 0544     Glucose 134 mg/dL      BUN 17 mg/dL      Creatinine 0.59 mg/dL      Sodium 136 mmol/L      Potassium 4.3 " mmol/L      Chloride 102 mmol/L      CO2 24.1 mmol/L      Calcium 8.7 mg/dL      Total Protein 7.3 g/dL      Albumin 3.50 g/dL      ALT (SGPT) 67 U/L      AST (SGOT) 54 U/L      Alkaline Phosphatase 109 U/L      Total Bilirubin 0.5 mg/dL      eGFR Non African Amer 113 mL/min/1.73      Globulin 3.8 gm/dL      A/G Ratio 0.9 g/dL      BUN/Creatinine Ratio 28.8     Anion Gap 9.9 mmol/L     Narrative:      GFR Normal >60  Chronic Kidney Disease <60  Kidney Failure <15      Ferritin [[id number]]  (Abnormal) Collected: 12/09/21 0316    Specimen: Blood Updated: 12/09/21 0544     Ferritin 244.00 ng/mL     Narrative:      Results may be falsely decreased if patient taking Biotin.      D-dimer, Quantitative [[id number]]  (Abnormal) Collected: 12/08/21 2034    Specimen: Blood Updated: 12/08/21 2051     D-Dimer, Quantitative 1.19 MCGFEU/mL     Narrative:      Can be elevated in, but is not diagnostic for deep vein thrombosis (DVT) or pulmonary embolis (PE).  It is also elevated in other medical conditions.  Clinical correlation is required.  The negative cut-off value for the D-Dimer is 0.50 mcg FEU/mL for DVT and PE.            Imaging Results (Last 24 Hours)     ** No results found for the last 24 hours. **          Medication Review:   I have reviewed the patient's current medication list    Current Facility-Administered Medications:   •  acetaminophen (TYLENOL) tablet 650 mg, 650 mg, Oral, Q4H PRN, 650 mg at 12/08/21 2300 **OR** acetaminophen (TYLENOL) 160 MG/5ML solution 650 mg, 650 mg, Oral, Q4H PRN **OR** acetaminophen (TYLENOL) suppository 650 mg, 650 mg, Rectal, Q4H PRN, Burgos, Birrilla, DO  •  albuterol sulfate HFA (PROVENTIL HFA;VENTOLIN HFA;PROAIR HFA) inhaler 2 puff, 2 puff, Inhalation, Q4H PRN, Burgos, Birrilla, DO  •  benzonatate (TESSALON) capsule 200 mg, 200 mg, Oral, TID PRN, Burgos, Birrilla, DO, 200 mg at 12/09/21 0941  •  dexamethasone (DECADRON) tablet 6 mg, 6 mg, Oral, Q12H, Ben Rodríguez MD, 6 mg at  12/09/21 0941  •  enoxaparin (LOVENOX) syringe 40 mg, 40 mg, Subcutaneous, Q24H, Burgos, Birrilla, DO, 40 mg at 12/09/21 0941  •  guaiFENesin-dextromethorphan (ROBITUSSIN DM) 100-10 MG/5ML syrup 5 mL, 5 mL, Oral, Q4H PRN, Burgos, Birrilla, DO, 5 mL at 12/09/21 0940  •  nitroglycerin (NITROSTAT) SL tablet 0.4 mg, 0.4 mg, Sublingual, Q5 Min PRN, Burgos, Birrilla, DO  •  ondansetron (ZOFRAN) tablet 4 mg, 4 mg, Oral, Q6H PRN **OR** ondansetron (ZOFRAN) injection 4 mg, 4 mg, Intravenous, Q6H PRN, Burgos, Birrilla, DO  •  pantoprazole (PROTONIX) EC tablet 40 mg, 40 mg, Oral, Q AM, Ben Rodríguez MD, 40 mg at 12/09/21 0659  •  sodium chloride 0.9 % flush 10 mL, 10 mL, Intravenous, PRN, Burgos, Birrilla, DO  •  sodium chloride 0.9 % flush 10 mL, 10 mL, Intravenous, Q12H, Burgos, Birrilla, DO, 10 mL at 12/09/21 0942  •  sodium chloride 0.9 % infusion 40 mL, 40 mL, Intravenous, PRN, Burgos, Birrilla, DO      Assessment/Plan     1.  Acute Hypoxic Respiratory Failure due to COVID-19 Pneumonia: I turned her O2 off at the start of my interview, but she slowly dropped into the mid-80's.  I turned her back to 1L with an appropriate recovery.  Will continue BID steroids for now, but anticipate weaning over the next few days.    2.  Transaminitis:  Relatively unchanged.  Would like to check an ultrasound at some point to exclude fatty infiltration.    Plan for disposition: Home when able.    Ben Rodríguez MD  12/09/21  18:32 EST

## 2021-12-09 NOTE — PLAN OF CARE
Goal Outcome Evaluation:  Plan of Care Reviewed With: patient        Progress: improving  Outcome Summary: Pt resting well through out the night. no complaints at all. VSS. Tele Al-NSR 3L NC.

## 2021-12-10 LAB — D DIMER PPP FEU-MCNC: 1.39 MCGFEU/ML (ref 0–0.46)

## 2021-12-10 PROCEDURE — 94618 PULMONARY STRESS TESTING: CPT

## 2021-12-10 PROCEDURE — 63710000001 DEXAMETHASONE PER 0.25 MG: Performed by: HOSPITALIST

## 2021-12-10 PROCEDURE — 99231 SBSQ HOSP IP/OBS SF/LOW 25: CPT | Performed by: HOSPITALIST

## 2021-12-10 PROCEDURE — 25010000002 ENOXAPARIN PER 10 MG: Performed by: INTERNAL MEDICINE

## 2021-12-10 PROCEDURE — 85379 FIBRIN DEGRADATION QUANT: CPT | Performed by: HOSPITALIST

## 2021-12-10 RX ORDER — DEXAMETHASONE 4 MG/1
6 TABLET ORAL
Status: DISCONTINUED | OUTPATIENT
Start: 2021-12-11 | End: 2021-12-11 | Stop reason: HOSPADM

## 2021-12-10 RX ADMIN — ACETAMINOPHEN 650 MG: 325 TABLET, FILM COATED ORAL at 20:15

## 2021-12-10 RX ADMIN — SODIUM CHLORIDE, PRESERVATIVE FREE 10 ML: 5 INJECTION INTRAVENOUS at 20:15

## 2021-12-10 RX ADMIN — PANTOPRAZOLE SODIUM 40 MG: 40 TABLET, DELAYED RELEASE ORAL at 06:05

## 2021-12-10 RX ADMIN — GUAIFENESIN SYRUP AND DEXTROMETHORPHAN 5 ML: 100; 10 SYRUP ORAL at 20:15

## 2021-12-10 RX ADMIN — SODIUM CHLORIDE, PRESERVATIVE FREE 10 ML: 5 INJECTION INTRAVENOUS at 09:53

## 2021-12-10 RX ADMIN — ENOXAPARIN SODIUM 40 MG: 40 INJECTION SUBCUTANEOUS at 09:51

## 2021-12-10 RX ADMIN — DEXAMETHASONE 6 MG: 4 TABLET ORAL at 09:51

## 2021-12-10 RX ADMIN — BENZONATATE 200 MG: 100 CAPSULE ORAL at 06:05

## 2021-12-10 RX ADMIN — BENZONATATE 200 MG: 100 CAPSULE ORAL at 20:15

## 2021-12-10 NOTE — PROGRESS NOTES
Adult Nutrition  Assessment/PES    Patient Name:  Marilee Kraus  YOB: 1980  MRN: [id number]  Admit Date:  12/4/2021    Assessment Date:  12/10/2021    Recommend: Boost Plus supplement with meals due to reduced po intake   Will cont to follow and monitor.      Reason for Assessment     Row Name 12/10/21 1342          Reason for Assessment    Reason For Assessment per organizational policy  LOS     Diagnosis pulmonary disease; infection/sepsis  COVID PNA                Nutrition/Diet History     Row Name 12/10/21 1343          Nutrition/Diet History    Typical Food/Fluid Intake Called to room no answer. Noted limited intake reported. Per rounds possible home today.                Anthropometrics     Row Name 12/10/21 1343          Anthropometrics    Weight --  156#            Body Mass Index (BMI)    BMI Assessment BMI 25-29.9: overweight                Labs/Tests/Procedures/Meds     Row Name 12/10/21 1343          Labs/Procedures/Meds    Lab Results Reviewed reviewed     Lab Results Comments ALT 67 H            Diagnostic Tests/Procedures    Diagnostic Test/Procedure Reviewed reviewed            Medications    Pertinent Medications Reviewed reviewed     Pertinent Medications Comments decadron                  Estimated/Assessed Needs     Row Name 12/10/21 1344          Estimated/Assessed Needs    Additional Documentation Calorie Requirements (Group); Fluid Requirements (Group); Protein Requirements (Group)            Calorie Requirements    Estimated Calorie Need Method [place]-St Jeor     Estimated Calorie Requirement Comment 1743 kcal ( mifflin 1.25)            Protein Requirements    Est Protein Requirement Amount (gms/kg) 1.0 gm protein  71 gm pro            Fluid Requirements    Estimated Fluid Requirement Method RDA Method  1743 ml                Nutrition Prescription Ordered     Row Name 12/10/21 1344          Nutrition Prescription PO    Current PO Diet Regular                Evaluation  of Received Nutrient/Fluid Intake     Row Name 12/10/21 1344          Fluid Intake Evaluation    Oral Fluid (mL) 560  ave x 3, 32%            PO Evaluation    Number of Meals 9     % PO Intake 33                     Problem/Interventions:   Problem 1     Row Name 12/10/21 1343          Nutrition Diagnoses Problem 1    Problem 1 Inadequate Nutrient Intake     Etiology (related to) Factors Affecting Nutrition; Medical Diagnosis     Signs/Symptoms (evidenced by) Report/Observation                      Intervention Goal     Row Name 12/10/21 1349          Intervention Goal    General Meet nutritional needs for age/condition     PO Increase intake; PO intake (%)     PO Intake % 50 %  or greater     Weight No significant weight loss                Nutrition Intervention     Row Name 12/10/21 134          Nutrition Intervention    RD/Tech Action Encourage intake; Follow Tx progress                  Education/Evaluation     Row Name 12/10/21 7323          Education    Education Education not appropriate at this time            Monitor/Evaluation    Monitor Per protocol; I&O; PO intake; Pertinent labs; Weight; Symptoms                 Electronically signed by:  Leeanna Allen RD  12/10/21 13:46 EST

## 2021-12-10 NOTE — PROCEDURES
Exercise Oximetry    Patient Name:Marilee Kraus   MRN: [id number]   Date: 12/10/21             ROOM AIR BASELINE   SpO2% 87   Heart Rate    Blood Pressure      EXERCISE ON ROOM AIR SpO2% EXERCISE ON O2 @ 2LPM SpO2%   1 MINUTE  1 MINUTE  92   2 MINUTES  2 MINUTES  91   3 MINUTES  3 MINUTES  88   4 MINUTES  4 MINUTES  89   5 MINUTES  5 MINUTES     6 MINUTES  6 MINUTES               Distance Walked   Distance Walked 90'   Dyspnea (Erin Scale) Dyspnea (Erin Scale) 3   Fatigue (Erin Scale) Fatigue (Erin Scale)   2   SpO2% Post Exercise  SpO2% Post Exercise 91 on 2L x 2 min rest   HR Post Exercise   HR Post Exercise    Time to Recovery     Comments: Pt's resting SPO2 on room air was 87%. Pt placed on 2L & ambulated x 90', maintaining SPO2 >     87%. Pt tolerated well.     Faroese interpretor Campbell # [id number] used.    headache, no GHTN or pre-eclampsia

## 2021-12-10 NOTE — PROGRESS NOTES
"Hospitalist Team      Patient Care Team:  Provider, No Known as PCP - Shruthi Wilkinson MD (Internal Medicine)      Chief Complaint: Follow-up Acute Hypoxic Respiratory Failure due to COVID-19    Subjective    Doing well and reports she feels better.  Appetite still poor, but she is tolerating some water.  She was able to complete walking oximetry.    Objective    Vital Signs  Temp:  [97.8 °F (36.6 °C)-98.2 °F (36.8 °C)] 97.8 °F (36.6 °C)  Heart Rate:  [58-71] 60  Resp:  [18-20] 18  BP: (90-94)/(57-60) 92/57  Oxygen Therapy  SpO2: 97 %  Pulse Oximetry Type: Continuous  Device (Oxygen Therapy): nasal cannula  Flow (L/min): 1}    Flowsheet Rows      First Filed Value   Admission Height 167.6 cm (66\") Documented at 12/04/2021 2237   Admission Weight 68.6 kg (151 lb 4.8 oz) Documented at 12/04/2021 2237        Physical Exam:    General: Appears in no acute distress.  Lungs: Diminished.  CV: Regular.  Abdomen: Soft and non-tender w/ active bowel sounds.  MSK: No C/C/E.  Neuro: CN II-XII grossly intact.  Psych: Normal affect.  Does not appear toxic.    Results Review:     I reviewed the patient's new clinical results.    Lab Results (last 24 hours)     Lab Holiday          Imaging Results (Last 24 Hours)     ** No results found for the last 24 hours. **          Medication Review:   I have reviewed the patient's current medication list    Current Facility-Administered Medications:   •  acetaminophen (TYLENOL) tablet 650 mg, 650 mg, Oral, Q4H PRN, 650 mg at 12/09/21 2146 **OR** acetaminophen (TYLENOL) 160 MG/5ML solution 650 mg, 650 mg, Oral, Q4H PRN **OR** acetaminophen (TYLENOL) suppository 650 mg, 650 mg, Rectal, Q4H PRN, Burgos, Birrilla, DO  •  albuterol sulfate HFA (PROVENTIL HFA;VENTOLIN HFA;PROAIR HFA) inhaler 2 puff, 2 puff, Inhalation, Q4H PRN, Burgos, Birrilla, DO  •  benzonatate (TESSALON) capsule 200 mg, 200 mg, Oral, TID PRN, Shine Burgos DO, 200 mg at 12/10/21 0605  •  [START ON 12/11/2021] " dexamethasone (DECADRON) tablet 6 mg, 6 mg, Oral, Daily With Breakfast, Ben Rodríguez MD  •  enoxaparin (LOVENOX) syringe 40 mg, 40 mg, Subcutaneous, Q24H, Burgos, Birrilla, DO, 40 mg at 12/10/21 0951  •  guaiFENesin-dextromethorphan (ROBITUSSIN DM) 100-10 MG/5ML syrup 5 mL, 5 mL, Oral, Q4H PRN, Burgos, Birrilla, DO, 5 mL at 12/09/21 2146  •  nitroglycerin (NITROSTAT) SL tablet 0.4 mg, 0.4 mg, Sublingual, Q5 Min PRN, Burgos, Birrilla, DO  •  ondansetron (ZOFRAN) tablet 4 mg, 4 mg, Oral, Q6H PRN **OR** ondansetron (ZOFRAN) injection 4 mg, 4 mg, Intravenous, Q6H PRN, Burgos, Birrilla, DO  •  pantoprazole (PROTONIX) EC tablet 40 mg, 40 mg, Oral, Q AM, Ben Rodríguez MD, 40 mg at 12/10/21 0605  •  sodium chloride 0.9 % flush 10 mL, 10 mL, Intravenous, PRN, Burgos, Birrilla, DO  •  sodium chloride 0.9 % flush 10 mL, 10 mL, Intravenous, Q12H, Burgos, Birrilla, DO, 10 mL at 12/10/21 0953  •  sodium chloride 0.9 % infusion 40 mL, 40 mL, Intravenous, PRN, Burgos, Birrilla, DO      Assessment/Plan     1.  Acute Respiratory Failure due to COVID-19: Walking Oximetry improved versus last attempt.  Will arrange for home O2.  Repeat labs in AM.    Plan for disposition: A.D. in A.M.    Ben Rodríguez MD  12/10/21  15:55 EST

## 2021-12-10 NOTE — PLAN OF CARE
Goal Outcome Evaluation:  Plan of Care Reviewed With: patient        Progress: improving  Outcome Summary: Pt VSS, am labs pending. Pt continues HR 60's overnight. Pt continues RT titrating O2, currently on 1L NC; see RT notes, flowsheet. Pt resting at this time.

## 2021-12-10 NOTE — PLAN OF CARE
Problem: Adult Inpatient Plan of Care  Goal: Plan of Care Review  Outcome: Ongoing, Progressing  Flowsheets (Taken 12/10/2021 1721)  Progress: improving  Plan of Care Reviewed With: patient  Outcome Summary: pt on 1 L NC - portable oxygen delivered to nurses station for poss d/c tomorrow.  Independent in room. Remains in enhanced isolations   Goal Outcome Evaluation:  Plan of Care Reviewed With: patient        Progress: improving  Outcome Summary: pt on 1 L NC - portable oxygen delivered to nurses station for poss d/c tomorrow.  Independent in room. Remains in enhanced isolations

## 2021-12-10 NOTE — CASE MANAGEMENT/SOCIAL WORK
Continued Stay Note  VINCENT SaldanaCuney     Patient Name: Marilee Kraus  MRN: [id number]  Today's Date: 12/10/2021    Admit Date: 12/4/2021     Discharge Plan     Row Name 12/10/21 1549       Plan    Plan plan home with family    Plan Comments Rec'd call from [employer] [employer]  regarding contacts to assist w in network DME/HH.  Noted need for new 02 set up at dc. Spoke with Verito CUBA/Linh who states [facility] is in network for DME and [facility] is in network for HH. Spoke with Shan/[facility] - informed anticipate dc tomorrow for covid + patient. Clinicals scanned to email. He states they will deliver portable 02 to nurses station this afternoon and request CM call [facility] @ [phone] tomorrow when patient leaves and they will deliver concentrator to the home.MD & Jennifer Gonzales updated. CM will continue to follow.               Discharge Codes    No documentation.                     Santana Marinelli RN

## 2021-12-11 ENCOUNTER — READMISSION MANAGEMENT (OUTPATIENT)
Dept: CALL CENTER | Facility: HOSPITAL | Age: 41
End: 2021-12-11

## 2021-12-11 VITALS
SYSTOLIC BLOOD PRESSURE: 94 MMHG | OXYGEN SATURATION: 94 % | WEIGHT: 156 LBS | HEART RATE: 62 BPM | TEMPERATURE: 97.7 F | HEIGHT: 66 IN | DIASTOLIC BLOOD PRESSURE: 58 MMHG | BODY MASS INDEX: 25.07 KG/M2 | RESPIRATION RATE: 16 BRPM

## 2021-12-11 PROBLEM — D89.831 CYTOKINE RELEASE SYNDROME, GRADE 1: Status: ACTIVE | Noted: 2021-12-11

## 2021-12-11 LAB
ALBUMIN SERPL-MCNC: 3.1 G/DL (ref 3.5–5.2)
ALP SERPL-CCNC: 93 U/L (ref 39–117)
ALT SERPL W P-5'-P-CCNC: 54 U/L (ref 1–33)
AST SERPL-CCNC: 29 U/L (ref 1–32)
BILIRUB CONJ SERPL-MCNC: <0.2 MG/DL (ref 0–0.3)
BILIRUB INDIRECT SERPL-MCNC: ABNORMAL MG/DL
BILIRUB SERPL-MCNC: 0.6 MG/DL (ref 0–1.2)
CRP SERPL-MCNC: <0.3 MG/DL (ref 0–0.5)
FERRITIN SERPL-MCNC: 229 NG/ML (ref 13–150)
PLATELET # BLD AUTO: 460 10*3/MM3 (ref 140–450)
PROT SERPL-MCNC: 7 G/DL (ref 6–8.5)

## 2021-12-11 PROCEDURE — 94799 UNLISTED PULMONARY SVC/PX: CPT

## 2021-12-11 PROCEDURE — 86140 C-REACTIVE PROTEIN: CPT | Performed by: HOSPITALIST

## 2021-12-11 PROCEDURE — 80076 HEPATIC FUNCTION PANEL: CPT | Performed by: HOSPITALIST

## 2021-12-11 PROCEDURE — 63710000001 DEXAMETHASONE PER 0.25 MG: Performed by: HOSPITALIST

## 2021-12-11 PROCEDURE — 82728 ASSAY OF FERRITIN: CPT | Performed by: HOSPITALIST

## 2021-12-11 PROCEDURE — 94761 N-INVAS EAR/PLS OXIMETRY MLT: CPT

## 2021-12-11 PROCEDURE — 85049 AUTOMATED PLATELET COUNT: CPT | Performed by: INTERNAL MEDICINE

## 2021-12-11 PROCEDURE — 25010000002 ENOXAPARIN PER 10 MG: Performed by: INTERNAL MEDICINE

## 2021-12-11 PROCEDURE — 99238 HOSP IP/OBS DSCHRG MGMT 30/<: CPT | Performed by: HOSPITALIST

## 2021-12-11 RX ORDER — BENZONATATE 200 MG/1
200 CAPSULE ORAL 3 TIMES DAILY PRN
Qty: 10 CAPSULE | Refills: 0 | Status: SHIPPED | OUTPATIENT
Start: 2021-12-11 | End: 2022-04-18

## 2021-12-11 RX ORDER — DEXAMETHASONE 6 MG/1
6 TABLET ORAL
Qty: 4 TABLET | Refills: 0 | Status: SHIPPED | OUTPATIENT
Start: 2021-12-12 | End: 2021-12-16

## 2021-12-11 RX ADMIN — ENOXAPARIN SODIUM 40 MG: 40 INJECTION SUBCUTANEOUS at 09:09

## 2021-12-11 RX ADMIN — DEXAMETHASONE 6 MG: 4 TABLET ORAL at 09:09

## 2021-12-11 RX ADMIN — PANTOPRAZOLE SODIUM 40 MG: 40 TABLET, DELAYED RELEASE ORAL at 07:45

## 2021-12-11 RX ADMIN — SODIUM CHLORIDE, PRESERVATIVE FREE 10 ML: 5 INJECTION INTRAVENOUS at 09:09

## 2021-12-11 NOTE — PLAN OF CARE
Goal Outcome Evaluation:  Plan of Care Reviewed With: patient        Progress: improving  Outcome Summary: Pt VSS, am labs pending.  Pt continues on 1L NC 02. Pt up ad arlene. Pt reports any pain and cough controlled with PRN meds, see emar, flowsheet. Pt continues COVID Enhanced isolation, resting at this time.

## 2021-12-11 NOTE — PLAN OF CARE
Goal Outcome Evaluation:  Plan of Care Reviewed With: patient        Progress: improving  Outcome Summary: Enhanced isolation maintained, o2 at 1l nc, same as home dose, no c/o soa reported, up ad arlene and anticipates going home today. Denies pain/discomfort at this time.

## 2021-12-11 NOTE — DISCHARGE SUMMARY
Marilee Kraus  1980  [id number]    Hospitalists Discharge Summary    Date of Admission: 12/4/2021  Date of Discharge:  12/14/2021    Primary Discharge Diagnoses:  1.  Acute Hypoxic Respiratory Failure due to COVID-19 Pneumonia  2.  Suspected Viral Transaminitis    History of Present Illness (taken from H&P):  Patient is a very pleasant 41 year old  female who presented to the ED complaining of moderate - severe shortness of breath that has been worsening over the past 10 days. Patient states she initially had a cough and body aches and test positive for COVID 19 on 11/25/2021. She states she has been progressively worsening and decided to come to the ED tonight. She has a productive cough with white and brown sputum. She has also lost taste and smell. She reports she has not been vaccinated against COVID 19.       Hospital Course:  Ms. Kraus was admitted to the Med/Surg unit and continued on supplemental O2.  She was out of the therapeutic window for Remdesivir, but was treated w/ Decadron.  Her O2 was weaned as able, and her walking oximetry demonstrated a need for supplemental O2 even in the face of decreasing inflammatory markers.  It was felt her transaminitis was due to the COVID-19 and should be follow-up as an outpatient.    There was an incident during her stay where her boyfriend and son came into visit her.  Both were unvaccinated and did not have the appropriate PPE on while in her room.  Both were asked to leave, and the [facility] Department was notified of their names and numbers.  Thankfully, they have not yet to demonstrate symptoms.    PCP  Patient Care Team:  Provider, No Known as PCP - Shruthi Wilkinson MD (Internal Medicine)    Consults:   Consults     No orders found from 11/5/2021 to 12/5/2021.          Operations and Procedures Performed:       Walking Oximetry    Result Date: 12/10/2021  Narrative: Ajith Irby RRT, RP     12/10/2021 12:49 PM  Exercise Oximetry Patient Name:Marilee Kraus MRN: [id number] Date: 12/10/21         ROOM AIR BASELINE SpO2% 87 Heart Rate  Blood Pressure  EXERCISE ON ROOM AIR SpO2% EXERCISE ON O2 @ 2LPM SpO2% 1 MINUTE  1 MINUTE  92 2 MINUTES  2 MINUTES  91 3 MINUTES  3 MINUTES  88 4 MINUTES  4 MINUTES  89 5 MINUTES  5 MINUTES   6 MINUTES  6 MINUTES           Distance Walked   Distance Walked 90' Dyspnea (Erin Scale) Dyspnea (Erin Scale) 3 Fatigue (Erin Scale) Fatigue (Erin Scale)   2 SpO2% Post Exercise  SpO2% Post Exercise 91 on 2L x 2 min rest HR Post Exercise   HR Post Exercise  Time to Recovery Comments: Pt's resting SPO2 on room air was 87%. Pt placed on 2L & ambulated x 90', maintaining SPO2 > 87%. Pt tolerated well. German interpretor Campbell # [id number] used.     Walking Oximetry    Result Date: 12/6/2021  Narrative: Nuha Seals, CRT     12/6/2021 12:29 PM Exercise Oximetry Patient Name:Marilee Kraus MRN: [id number] Date: 12/06/21         ROOM AIR BASELINE SpO2% 84 Heart Rate 74 Blood Pressure  EXERCISE ON ROOM AIR SpO2% EXERCISE ON O2 @ 2 LPM SpO2% 1 MINUTE  1 MINUTE sitting on side of bed  84 2 MINUTES  2 MINUTES  3 MINUTES  3 MINUTES  4 MINUTES  4 MINUTES  5 MINUTES  5 MINUTES  6 MINUTES  6 MINUTES           Distance Walked   Distance Walked Dyspnea (Erin Scale)   Dyspnea (Erin Scale) Fatigue (Erin Scale)   Fatigue (Erin Scale) SpO2% Post Exercise   SpO2% Post Exercise HR Post Exercise   HR Post Exercise Time to Recovery   Time to Recovery Comments: At rest on 2lpm, SpO2 is 91%.  Sitting up to the side of the bed sats dropped and required 4lpm for a SpO2 of 90%.  Upon standing and walking 10ft, Pt required 6lpm to maintain a SpO2 of 89%.  Walk ended and Dr Rodríguez notified    XR Chest AP    Result Date: 12/5/2021  Narrative: CR Chest 1 Vw INDICATION: Cough and fever and shortness of air today. Covid positive patient COMPARISON:  May 20 FINDINGS: Portable AP view(s) of the chest.  There are numerous  bilateral patchy infiltrates suggesting Covid 19 pneumonia. Heart size is normal. Bones are normal     Impression: Patchy bilateral infiltrates consistent with Covid 19 pneumonia Signer Name: Tirso Locke MD  Signed: 12/5/2021 12:23 AM  Workstation Name: [email or web address]  [facility]      Allergies:  is allergic to amoxapine and amoxicillin.      Discharge Medications:     Discharge Medications      New Medications      Instructions Start Date   benzonatate 200 MG capsule  Commonly known as: TESSALON   200 mg, Oral, 3 Times Daily PRN      dexamethasone 6 MG tablet  Commonly known as: DECADRON   6 mg, Oral, Daily With Breakfast             Last Lab Results:   Lab Results (most recent)     Procedure Component Value Units Date/Time    Hepatic Function Panel [[id number]]  (Abnormal) Collected: 12/11/21 0625    Specimen: Blood Updated: 12/11/21 0756     Total Protein 7.0 g/dL      Albumin 3.10 g/dL      ALT (SGPT) 54 U/L      AST (SGOT) 29 U/L      Comment: Specimen hemolyzed.  Results may be affected.        Alkaline Phosphatase 93 U/L      Total Bilirubin 0.6 mg/dL      Bilirubin, Direct <0.2 mg/dL      Comment: Specimen hemolyzed. Results may be affected.        Bilirubin, Indirect --     Comment: Unable to calculate       Ferritin [[id number]]  (Abnormal) Collected: 12/11/21 0625    Specimen: Blood Updated: 12/11/21 0753     Ferritin 229.00 ng/mL     Narrative:      Results may be falsely decreased if patient taking Biotin.      Platelet Count [[id number]]  (Abnormal) Collected: 12/11/21 0625    Specimen: Blood Updated: 12/11/21 0725     Platelets 460 10*3/mm3     C-reactive Protein [[id number]] Collected: 12/11/21 0625    Specimen: Blood Updated: 12/11/21 0715    D-dimer, Quantitative [[id number]]  (Abnormal) Collected: 12/10/21 1651    Specimen: Blood Updated: 12/10/21 1706     D-Dimer, Quantitative 1.39 MCGFEU/mL     Narrative:      Can be elevated in, but is not diagnostic for deep vein thrombosis  (DVT) or pulmonary embolis (PE).  It is also elevated in other medical conditions.  Clinical correlation is required.  The negative cut-off value for the D-Dimer is 0.50 mcg FEU/mL for DVT and PE.      C-reactive Protein [[id number]]  (Normal) Collected: 12/09/21 0316    Specimen: Blood Updated: 12/09/21 1216     C-Reactive Protein 0.44 mg/dL     Procalcitonin [[id number]]  (Normal) Collected: 12/09/21 0316    Specimen: Blood Updated: 12/09/21 0545     Procalcitonin 0.03 ng/mL     Narrative:      Results may be falsely decreased if patient taking Biotin.     Comprehensive Metabolic Panel [[id number]]  (Abnormal) Collected: 12/09/21 0316    Specimen: Blood Updated: 12/09/21 0544     Glucose 134 mg/dL      BUN 17 mg/dL      Creatinine 0.59 mg/dL      Sodium 136 mmol/L      Potassium 4.3 mmol/L      Chloride 102 mmol/L      CO2 24.1 mmol/L      Calcium 8.7 mg/dL      Total Protein 7.3 g/dL      Albumin 3.50 g/dL      ALT (SGPT) 67 U/L      AST (SGOT) 54 U/L      Alkaline Phosphatase 109 U/L      Total Bilirubin 0.5 mg/dL      eGFR Non African Amer 113 mL/min/1.73      Globulin 3.8 gm/dL      A/G Ratio 0.9 g/dL      BUN/Creatinine Ratio 28.8     Anion Gap 9.9 mmol/L     Narrative:      GFR Normal >60  Chronic Kidney Disease <60  Kidney Failure <15      Ferritin [[id number]]  (Abnormal) Collected: 12/09/21 0316    Specimen: Blood Updated: 12/09/21 0544     Ferritin 244.00 ng/mL     Narrative:      Results may be falsely decreased if patient taking Biotin.      D-dimer, Quantitative [[id number]]  (Abnormal) Collected: 12/08/21 2034    Specimen: Blood Updated: 12/08/21 2051     D-Dimer, Quantitative 1.19 MCGFEU/mL     Narrative:      Can be elevated in, but is not diagnostic for deep vein thrombosis (DVT) or pulmonary embolis (PE).  It is also elevated in other medical conditions.  Clinical correlation is required.  The negative cut-off value for the D-Dimer is 0.50 mcg FEU/mL for DVT and PE.      Urine Drug Screen - Urine,  Clean Catch [[id number]]  (Normal) Collected: 12/08/21 1521    Specimen: Urine, Clean Catch Updated: 12/08/21 1539     THC, Screen, Urine Negative     Phencyclidine (PCP), Urine Negative     Cocaine Screen, Urine Negative     Methamphetamine, Ur Negative     Opiate Screen Negative     Amphetamine Screen, Urine Negative     Benzodiazepine Screen, Urine Negative     Tricyclic Antidepressants Screen Negative     Methadone Screen, Urine Negative     Barbiturates Screen, Urine Negative     Oxycodone Screen, Urine Negative     Propoxyphene Screen Negative     Buprenorphine, Screen, Urine Negative    Narrative:      Urine drug screen results are to be used for medical purposes only.  They are not to be used for legal purposes such as employment testing.  Negative results do not necessarily mean the complete absence of a subtance, but rather that the result is less than the cutoff for that substance.  Positive results are unconfirmed and considered Preliminary Positive.  [facility] does not automatically confirm Postitive Unconfirmed results.  The physician may request (order) an Unconfirmed Positive result to be sent out for confirmation.      Negative Thresholds for Drugs Screened:    THC screen, urine                          50 ng/ml  Phenycyclidine (PCP), urine                25 ng/ml  Cocaine screen, urine                     150 ng/ml  Methamphetamine, urine                    500 ng/ml  Opiate screen, urine                      100 ng/ml  Amphetamine screen, urine                 500 ng/ml  Benzodiazepine screen, urine              150 ng/ml  Tricyclic Antidepressants screen, urine   300 ng/ml  Methadone screen, urine                   200 ng/ml  Barbiturates screen, urine                200 ng/ml  Oxycodone screen, urine                   100 ng/ml  Propoxyphene screen, urine                300 ng/ml  Buprenorphine screen, urine                10 ng/ml    Comprehensive Metabolic Panel [[id number]]   (Abnormal) Collected: 12/07/21 0421    Specimen: Blood Updated: 12/07/21 0538     Glucose 165 mg/dL      BUN 12 mg/dL      Creatinine 0.53 mg/dL      Sodium 136 mmol/L      Potassium 4.3 mmol/L      Chloride 103 mmol/L      CO2 23.7 mmol/L      Calcium 8.7 mg/dL      Total Protein 7.1 g/dL      Albumin 3.30 g/dL      ALT (SGPT) 53 U/L      AST (SGOT) 44 U/L      Alkaline Phosphatase 124 U/L      Total Bilirubin 0.4 mg/dL      eGFR Non African Amer 128 mL/min/1.73      Globulin 3.8 gm/dL      A/G Ratio 0.9 g/dL      BUN/Creatinine Ratio 22.6     Anion Gap 9.3 mmol/L     Narrative:      GFR Normal >60  Chronic Kidney Disease <60  Kidney Failure <15      Procalcitonin [[id number]]  (Normal) Collected: 12/07/21 0421    Specimen: Blood Updated: 12/07/21 0529     Procalcitonin 0.03 ng/mL     Narrative:      Results may be falsely decreased if patient taking Biotin.     CBC (No Diff) [[id number]]  (Abnormal) Collected: 12/06/21 0332    Specimen: Blood Updated: 12/06/21 0424     WBC 4.06 10*3/mm3      RBC 4.37 10*6/mm3      Hemoglobin 12.8 g/dL      Hematocrit 40.0 %      MCV 91.5 fL      MCH 29.3 pg      MCHC 32.0 g/dL      RDW 11.9 %      RDW-SD 40.5 fl      MPV 11.1 fL      Platelets 365 10*3/mm3     Manual Differential [[id number]]  (Abnormal) Collected: 12/05/21 0613    Specimen: Blood Updated: 12/05/21 0714     Neutrophil % 62.0 %      Lymphocyte % 15.0 %      Monocyte % 16.0 %      Eosinophil % 3.0 %      Basophil % 1.0 %      Atypical Lymphocyte % 3.0 %      Neutrophils Absolute 3.26 10*3/mm3      Lymphocytes Absolute 0.95 10*3/mm3      Monocytes Absolute 0.84 10*3/mm3      Eosinophils Absolute 0.16 10*3/mm3      Basophils Absolute 0.05 10*3/mm3      RBC Morphology Normal     WBC Morphology Normal     Large Platelets Slight/1+    CBC Auto Differential [[id number]]  (Abnormal) Collected: 12/05/21 0613    Specimen: Blood Updated: 12/05/21 0638     WBC 5.25 10*3/mm3      RBC 4.52 10*6/mm3      Hemoglobin 13.4 g/dL       Hematocrit 41.7 %      MCV 92.3 fL      MCH 29.6 pg      MCHC 32.1 g/dL      RDW 12.2 %      RDW-SD 41.1 fl      MPV 10.1 fL      Platelets 305 10*3/mm3      Neutrophil % 59.8 %      Lymphocyte % 23.2 %      Monocyte % 15.2 %      Eosinophil % 1.0 %      Basophil % 0.4 %      Immature Grans % 0.4 %      Neutrophils, Absolute 3.14 10*3/mm3      Lymphocytes, Absolute 1.22 10*3/mm3      Monocytes, Absolute 0.80 10*3/mm3      Eosinophils, Absolute 0.05 10*3/mm3      Basophils, Absolute 0.02 10*3/mm3      Immature Grans, Absolute 0.02 10*3/mm3      nRBC 0.0 /100 WBC     Lactate Dehydrogenase [[id number]]  (Abnormal) Collected: 12/04/21 2327    Specimen: Blood Updated: 12/05/21 0001      U/L     Blood Gas, Arterial - [[id number]]  (Abnormal) Collected: 12/04/21 2345    Specimen: Arterial Blood Updated: 12/04/21 2350     Site Right Radial     Mannie's Test Positive     pH, Arterial 7.471 pH units      Comment: 83 Value above reference range        pCO2, Arterial 39.0 mm Hg      pO2, Arterial 72.2 mm Hg      Comment: 84 Value below reference range        HCO3, Arterial 28.4 mmol/L      Comment: 83 Value above reference range        Base Excess, Arterial 4.5 mmol/L      Comment: 83 Value above reference range        O2 Saturation, Arterial 96.2 %      Hemoglobin, Blood Gas 13.9 g/dL      Temperature 37.0 C      Barometric Pressure for Blood Gas 744 mmHg      Modality Nasal Cannula     Flow Rate 2.0 lpm      Ventilator Mode NA     Collected by JENNY     Comment: Meter: [id number]     :  [id number]        pCO2, Temperature Corrected 39.0 mm Hg      pH, Temp Corrected 7.471 pH Units      pO2, Temperature Corrected 72.2 mm Hg     hCG, Serum, Qualitative [[id number]]  (Normal) Collected: 12/04/21 2327    Specimen: Blood Updated: 12/04/21 2350     HCG Qualitative Negative    Lactic Acid, Plasma [[id number]]  (Normal) Collected: 12/04/21 2327    Specimen: Blood Updated: 12/04/21 2348     Lactate 1.3 mmol/L     CBC  & Differential [[id number]]  (Abnormal) Collected: 12/04/21 2327    Specimen: Blood Updated: 12/04/21 2336    Narrative:      The following orders were created for panel order CBC & Differential.  Procedure                               Abnormality         Status                     ---------                               -----------         ------                     CBC Auto Differential[[id number]]        Abnormal            Final result                 Please view results for these tests on the individual orders.    CBC Auto Differential [[id number]]  (Abnormal) Collected: 12/04/21 2327    Specimen: Blood Updated: 12/04/21 2336     WBC 8.56 10*3/mm3      RBC 4.75 10*6/mm3      Hemoglobin 14.2 g/dL      Hematocrit 43.6 %      MCV 91.8 fL      MCH 29.9 pg      MCHC 32.6 g/dL      RDW 12.1 %      RDW-SD 41.0 fl      MPV 10.1 fL      Platelets 319 10*3/mm3      Neutrophil % 77.8 %      Lymphocyte % 12.0 %      Monocyte % 9.2 %      Eosinophil % 0.6 %      Basophil % 0.2 %      Immature Grans % 0.2 %      Neutrophils, Absolute 6.65 10*3/mm3      Lymphocytes, Absolute 1.03 10*3/mm3      Monocytes, Absolute 0.79 10*3/mm3      Eosinophils, Absolute 0.05 10*3/mm3      Basophils, Absolute 0.02 10*3/mm3      Immature Grans, Absolute 0.02 10*3/mm3      nRBC 0.0 /100 WBC         Imaging Results (Most Recent)     Procedure Component Value Units Date/Time    XR Chest AP [[id number]] Collected: 12/05/21 0023     Updated: 12/05/21 0026    Narrative:      CR Chest 1 Vw    INDICATION:   Cough and fever and shortness of air today. Covid positive patient     COMPARISON:    May 20    FINDINGS:  Portable AP view(s) of the chest.  There are numerous bilateral patchy infiltrates suggesting Covid 19 pneumonia. Heart size is normal. Bones are normal      Impression:      Patchy bilateral infiltrates consistent with Covid 19 pneumonia    Signer Name: Tirso Locke MD   Signed: 12/5/2021 12:23 AM   Workstation Name: [facility]    Radiology  [facility]          PROCEDURES      Condition on Discharge: Stable    Physical Exam at Discharge  Vital Signs  Vitals:    12/11/21 0607   BP: 94/58   Pulse: 62   Resp: 16   Temp: 97.7 °F (36.5 °C)   SpO2: 94%       Physical Exam:  Physical Exam   Constitutional: Patient appears well-developed and well-nourished and in no acute distress.  Non-toxic appearance.   Pulmonary/Chest: No respiratory distress.   Neurological: Cranial nerves II-XII are grossly intact with no focal deficits.    Discharge Disposition  Home    Visiting Nurse:    No     Home PT/OT:  No     Home Safety Evaluation:  No     DME  Home O2    Discharge Diet:   Regular      Activity at Discharge:  As tolerated    Follow-up Appointments  Future Appointments   Date Time Provider Department Center   1/3/2022  2:30 PM Arley, Lee Vaughan MD [facility]     Additional Instructions for the Follow-ups that You Need to Schedule     Discharge Follow-up with PCP   As directed       Currently Documented PCP:    Provider, No Known    PCP Phone Number:    [phone]     Follow Up Details: Needs to establish with PCP 1-2 weeks               Test Results Pending at Discharge  None     Ben Rodríguez MD  12/14/21  00:14 EST    Time: <30 minutes

## 2021-12-12 ENCOUNTER — READMISSION MANAGEMENT (OUTPATIENT)
Dept: CALL CENTER | Facility: HOSPITAL | Age: 41
End: 2021-12-12

## 2021-12-12 NOTE — CASE MANAGEMENT/SOCIAL WORK
Case Management Discharge Note      Final Note: DC home    Provided Post Acute Provider List?: Refused  Refused Provider List Comment: pt declined  Provided Post Acute Provider Quality & Resource List?: Refused  Refused Quality and Resource List Comment: pt declined    Selected Continued Care - Discharged on 12/11/2021 Admission date: 12/4/2021 - Discharge disposition: Home or Self Care    Destination    No services have been selected for the patient.              Durable Medical Equipment    No services have been selected for the patient.              Dialysis/Infusion    No services have been selected for the patient.              Home Medical Care    No services have been selected for the patient.              Therapy    No services have been selected for the patient.              Community Resources    No services have been selected for the patient.              Community & DME    No services have been selected for the patient.                       Final Discharge Disposition Code: 01 - home or self-care

## 2021-12-12 NOTE — OUTREACH NOTE
COVID-19 Week 1 Survey      Responses   [facility] patient discharged from? [place]   Does the patient have one of the following disease processes/diagnoses(primary or secondary)? COVID-19   COVID-19 underlying condition? None   Call Number Call 1   Week 1 Call successful? Yes   Call start time 1008   Call end time 1018   General alerts for this patient Call sister Crooks first   Discharge diagnosis Pneumonia due to COVID-19 virus   Person spoke with today (if not patient) and relationship Patient   Meds reviewed with patient/caregiver? Yes   Is the patient having any side effects they believe may be caused by any medication additions or changes? No   Does the patient have all medications ordered at discharge? Yes   Is the patient taking all medications as directed (includes completed medication regime)? Yes   Does the patient have a primary care provider?  No   PCP Nursing Intervention Provided number to obtain PCP   What DME was ordered? [place] O[facility]   Has all DME been delivered? Yes   Psychosocial issues? No   Did the patient receive a copy of their discharge instructions? Yes   Did the patient receive a copy of COVID-19 specific instructions? Yes   Nursing interventions Reviewed instructions with patient   What is the patient's perception of their health status since discharge? Improving   Does the patient have any of the following symptoms? Shortness of breath,  Cough   Nursing Interventions Nurse provided patient education   Pulse Ox monitoring Intermittent   Pulse Ox device source Hospital   O2 Sat comments 94% 2LO2   Is the patient/caregiver able to teach back steps to recovery at home? Rest and rebuild strength, gradually increase activity,  Eat a well-balance diet   If the patient is a current smoker, are they able to teach back resources for cessation? Not a smoker   Is the patient/caregiver able to teach back the hierarchy of who to call/visit for symptoms/problems? PCP, Specialist, Home health  nurse, Urgent Care, ED, 911 Yes   COVID-19 call completed? Yes   Wrap up additional comments Pt states she is better. No questions/concerns.          Yvonne Harris RN

## 2021-12-13 ENCOUNTER — READMISSION MANAGEMENT (OUTPATIENT)
Dept: CALL CENTER | Facility: HOSPITAL | Age: 41
End: 2021-12-13

## 2021-12-13 NOTE — OUTREACH NOTE
COVID-19 Week 1 Survey      Responses   [facility] patient discharged from? [place]   Does the patient have one of the following disease processes/diagnoses(primary or secondary)? COVID-19   COVID-19 underlying condition? None   Call Number Call 2   Week 1 Call successful? Yes   Call start time 1011   Call end time 1013   General alerts for this patient Call sister Crooks first   Discharge diagnosis Pneumonia due to COVID-19 virus   Person spoke with today (if not patient) and relationship Patient   Meds reviewed with patient/caregiver? Yes   Is the patient taking all medications as directed (includes completed medication regime)? Yes   Comments regarding PCP Pt has called for an appt    Has the patient kept scheduled appointments due by today? N/A   What is the patient's perception of their health status since discharge? Improving   Does the patient have any of the following symptoms? Cough   Pulse Ox monitoring Intermittent   O2 Sat comments 94% on RA    Is the patient/caregiver able to teach back steps to recovery at home? Rest and rebuild strength, gradually increase activity,  Eat a well-balance diet   COVID-19 call completed? Yes          Padmini Ovalle RN

## 2021-12-14 ENCOUNTER — READMISSION MANAGEMENT (OUTPATIENT)
Dept: CALL CENTER | Facility: HOSPITAL | Age: 41
End: 2021-12-14

## 2021-12-14 NOTE — OUTREACH NOTE
"COVID-19 Week 1 Survey      Responses   [facility] patient discharged from? [place]   Does the patient have one of the following disease processes/diagnoses(primary or secondary)? COVID-19   COVID-19 underlying condition? None   Call Number Call 3   Week 1 Call successful? Yes   Call start time 1207   Call end time 1208   Discharge diagnosis Pneumonia due to COVID-19 virus   Is patient permission given to speak with other caregiver? Yes   List who call center can speak with Avila Reyemohini sullivan    Person spoke with today (if not patient) and relationship Avila Reyes son    Meds reviewed with patient/caregiver? Yes   What is the patient's perception of their health status since discharge? Improving   Pulse Ox monitoring Intermittent   O2 Sat: education provided Sat levels,  Monitoring frequency,  When to seek care   O2 Sat education comments Son was unsure of the exact number the pt's O2 levels were running at but the pt told him she was doing better and her oxygen was \"fine\"    COVID-19 call completed? Yes          Quin García RN  "

## 2021-12-14 NOTE — PAYOR COMM NOTE
"Jama Tracey (41 y.o. Female)    ATTN: NURSE REVIEWER  RE: DISCHARGE NOTIFICATION  REF# [id number]  PLS REPLY TO MAYUR GOMEZ [phone] FAX# [phone]              Date of Birth Social Security Number Address Home Phone MRN    1980  [address] [phone] [id number]    [facility] Marital Status             None        Admission Date Admission Type Admitting Provider Attending Provider Department, Room/Bed    12/4/21 Emergency Shine Burgos [facility] MED SURG, 1401/1    Discharge Date Discharge Disposition Discharge Destination          12/11/2021 Home or Self Care              Attending Provider: (none)   Allergies: Amoxapine, Amoxicillin    Isolation: None   Infection: COVID (confirmed) (11/28/21)   Code Status: Prior   Advance Care Planning Activity    Ht: 167.6 cm (66\")   Wt: 70.8 kg (156 lb)    Admission Cmt: None   Principal Problem: None                Active Insurance as of 12/4/2021     Primary Coverage     Payor Plan Insurance Group Employer/Plan Group    CIG[employer] CIGNA [id number]     Payor Plan Address Payor Plan Phone Number Payor Plan Fax Number Effective Dates    [address] [phone]  1/1/2021 - None Entered    [facility] [id number]       Subscriber Name Subscriber Birth Date Member ID       JAMA TRACEY 1980 [id number]                 Emergency Contacts      (Rel.) Home Phone Work Phone Mobile Phone    patrick mohamud (Significant Other) [phone] -- --    REYES AVILA, ARTURO (Son) -- -- [phone]    Luis Felipe Kraus -- -- [phone]    Valeriy Bishop (Sister) -- -- [phone]               Discharge Summary      Ben Rodríguez MD at 12/11/21 0951          Jama Norwoodo  1980  [id number]    Hospitalists Discharge Summary    Date of Admission: 12/4/2021  Date of Discharge:  12/14/2021    Primary Discharge Diagnoses:  1.  Acute Hypoxic Respiratory Failure due " to COVID-19 Pneumonia  2.  Suspected Viral Transaminitis    History of Present Illness (taken from H&P):  Patient is a very pleasant 41 year old  female who presented to the ED complaining of moderate - severe shortness of breath that has been worsening over the past 10 days. Patient states she initially had a cough and body aches and test positive for COVID 19 on 11/25/2021. She states she has been progressively worsening and decided to come to the ED tonight. She has a productive cough with white and brown sputum. She has also lost taste and smell. She reports she has not been vaccinated against COVID 19.       Hospital Course:  Ms. Kraus was admitted to the Med/Surg unit and continued on supplemental O2.  She was out of the therapeutic window for Remdesivir, but was treated w/ Decadron.  Her O2 was weaned as able, and her walking oximetry demonstrated a need for supplemental O2 even in the face of decreasing inflammatory markers.  It was felt her transaminitis was due to the COVID-19 and should be follow-up as an outpatient.    There was an incident during her stay where her boyfriend and son came into visit her.  Both were unvaccinated and did not have the appropriate PPE on while in her room.  Both were asked to leave, and the [facility] Department was notified of their names and numbers.  Thankfully, they have not yet to demonstrate symptoms.    PCP  Patient Care Team:  Provider, No Known as PCP - General  Shruthi Martinez MD (Internal Medicine)    Consults:   Consults     No orders found from 11/5/2021 to 12/5/2021.          Operations and Procedures Performed:       Walking Oximetry    Result Date: 12/10/2021  Narrative: Ajith Irby, RRT, RPFT     12/10/2021 12:49 PM Exercise Oximetry Patient Name:Marilee Kraus MRN: [id number] Date: 12/10/21         ROOM AIR BASELINE SpO2% 87 Heart Rate  Blood Pressure  EXERCISE ON ROOM AIR SpO2% EXERCISE ON O2 @ 2LPM SpO2% 1 MINUTE  1  MINUTE  92 2 MINUTES  2 MINUTES  91 3 MINUTES  3 MINUTES  88 4 MINUTES  4 MINUTES  89 5 MINUTES  5 MINUTES   6 MINUTES  6 MINUTES           Distance Walked   Distance Walked 90' Dyspnea (Erin Scale) Dyspnea (Erin Scale) 3 Fatigue (Erin Scale) Fatigue (Erin Scale)   2 SpO2% Post Exercise  SpO2% Post Exercise 91 on 2L x 2 min rest HR Post Exercise   HR Post Exercise  Time to Recovery Comments: Pt's resting SPO2 on room air was 87%. Pt placed on 2L & ambulated x 90', maintaining SPO2 > 87%. Pt tolerated well. Hebrew interpretor Campbell # [id number] used.     Walking Oximetry    Result Date: 12/6/2021  Narrative: Nuha Seals, CRT     12/6/2021 12:29 PM Exercise Oximetry Patient Name:Marilee Kraus MRN: [id number] Date: 12/06/21         ROOM AIR BASELINE SpO2% 84 Heart Rate 74 Blood Pressure  EXERCISE ON ROOM AIR SpO2% EXERCISE ON O2 @ 2 LPM SpO2% 1 MINUTE  1 MINUTE sitting on side of bed  84 2 MINUTES  2 MINUTES  3 MINUTES  3 MINUTES  4 MINUTES  4 MINUTES  5 MINUTES  5 MINUTES  6 MINUTES  6 MINUTES           Distance Walked   Distance Walked Dyspnea (Erin Scale)   Dyspnea (Erin Scale) Fatigue (Erin Scale)   Fatigue (Erin Scale) SpO2% Post Exercise   SpO2% Post Exercise HR Post Exercise   HR Post Exercise Time to Recovery   Time to Recovery Comments: At rest on 2lpm, SpO2 is 91%.  Sitting up to the side of the bed sats dropped and required 4lpm for a SpO2 of 90%.  Upon standing and walking 10ft, Pt required 6lpm to maintain a SpO2 of 89%.  Walk ended and Dr Rodríguez notified    XR Chest AP    Result Date: 12/5/2021  Narrative: CR Chest 1 Vw INDICATION: Cough and fever and shortness of air today. Covid positive patient COMPARISON:  May 20 FINDINGS: Portable AP view(s) of the chest.  There are numerous bilateral patchy infiltrates suggesting Covid 19 pneumonia. Heart size is normal. Bones are normal     Impression: Patchy bilateral infiltrates consistent with Covid 19 pneumonia Signer Name: Tirso Locke MD  Signed:  12/5/2021 12:23 AM  Workstation Name: [email or web address]  [facility]      Allergies:  is allergic to amoxapine and amoxicillin.      Discharge Medications:     Discharge Medications      New Medications      Instructions Start Date   benzonatate 200 MG capsule  Commonly known as: TESSALON   200 mg, Oral, 3 Times Daily PRN      dexamethasone 6 MG tablet  Commonly known as: DECADRON   6 mg, Oral, Daily With Breakfast             Last Lab Results:   Lab Results (most recent)     Procedure Component Value Units Date/Time    Hepatic Function Panel [[id number]]  (Abnormal) Collected: 12/11/21 0625    Specimen: Blood Updated: 12/11/21 0756     Total Protein 7.0 g/dL      Albumin 3.10 g/dL      ALT (SGPT) 54 U/L      AST (SGOT) 29 U/L      Comment: Specimen hemolyzed.  Results may be affected.        Alkaline Phosphatase 93 U/L      Total Bilirubin 0.6 mg/dL      Bilirubin, Direct <0.2 mg/dL      Comment: Specimen hemolyzed. Results may be affected.        Bilirubin, Indirect --     Comment: Unable to calculate       Ferritin [[id number]]  (Abnormal) Collected: 12/11/21 0625    Specimen: Blood Updated: 12/11/21 0753     Ferritin 229.00 ng/mL     Narrative:      Results may be falsely decreased if patient taking Biotin.      Platelet Count [[id number]]  (Abnormal) Collected: 12/11/21 0625    Specimen: Blood Updated: 12/11/21 0725     Platelets 460 10*3/mm3     C-reactive Protein [[id number]] Collected: 12/11/21 0625    Specimen: Blood Updated: 12/11/21 0715    D-dimer, Quantitative [[id number]]  (Abnormal) Collected: 12/10/21 1651    Specimen: Blood Updated: 12/10/21 1706     D-Dimer, Quantitative 1.39 MCGFEU/mL     Narrative:      Can be elevated in, but is not diagnostic for deep vein thrombosis (DVT) or pulmonary embolis (PE).  It is also elevated in other medical conditions.  Clinical correlation is required.  The negative cut-off value for the D-Dimer is 0.50 mcg FEU/mL for DVT and PE.      C-reactive  Protein [[id number]]  (Normal) Collected: 12/09/21 0316    Specimen: Blood Updated: 12/09/21 1216     C-Reactive Protein 0.44 mg/dL     Procalcitonin [[id number]]  (Normal) Collected: 12/09/21 0316    Specimen: Blood Updated: 12/09/21 0545     Procalcitonin 0.03 ng/mL     Narrative:      Results may be falsely decreased if patient taking Biotin.     Comprehensive Metabolic Panel [[id number]]  (Abnormal) Collected: 12/09/21 0316    Specimen: Blood Updated: 12/09/21 0544     Glucose 134 mg/dL      BUN 17 mg/dL      Creatinine 0.59 mg/dL      Sodium 136 mmol/L      Potassium 4.3 mmol/L      Chloride 102 mmol/L      CO2 24.1 mmol/L      Calcium 8.7 mg/dL      Total Protein 7.3 g/dL      Albumin 3.50 g/dL      ALT (SGPT) 67 U/L      AST (SGOT) 54 U/L      Alkaline Phosphatase 109 U/L      Total Bilirubin 0.5 mg/dL      eGFR Non African Amer 113 mL/min/1.73      Globulin 3.8 gm/dL      A/G Ratio 0.9 g/dL      BUN/Creatinine Ratio 28.8     Anion Gap 9.9 mmol/L     Narrative:      GFR Normal >60  Chronic Kidney Disease <60  Kidney Failure <15      Ferritin [[id number]]  (Abnormal) Collected: 12/09/21 0316    Specimen: Blood Updated: 12/09/21 0544     Ferritin 244.00 ng/mL     Narrative:      Results may be falsely decreased if patient taking Biotin.      D-dimer, Quantitative [[id number]]  (Abnormal) Collected: 12/08/21 2034    Specimen: Blood Updated: 12/08/21 2051     D-Dimer, Quantitative 1.19 MCGFEU/mL     Narrative:      Can be elevated in, but is not diagnostic for deep vein thrombosis (DVT) or pulmonary embolis (PE).  It is also elevated in other medical conditions.  Clinical correlation is required.  The negative cut-off value for the D-Dimer is 0.50 mcg FEU/mL for DVT and PE.      Urine Drug Screen - Urine, Clean Catch [[id number]]  (Normal) Collected: 12/08/21 1521    Specimen: Urine, Clean Catch Updated: 12/08/21 1539     THC, Screen, Urine Negative     Phencyclidine (PCP), Urine Negative     Cocaine Screen, Urine  Negative     Methamphetamine, Ur Negative     Opiate Screen Negative     Amphetamine Screen, Urine Negative     Benzodiazepine Screen, Urine Negative     Tricyclic Antidepressants Screen Negative     Methadone Screen, Urine Negative     Barbiturates Screen, Urine Negative     Oxycodone Screen, Urine Negative     Propoxyphene Screen Negative     Buprenorphine, Screen, Urine Negative    Narrative:      Urine drug screen results are to be used for medical purposes only.  They are not to be used for legal purposes such as employment testing.  Negative results do not necessarily mean the complete absence of a subtance, but rather that the result is less than the cutoff for that substance.  Positive results are unconfirmed and considered Preliminary Positive.  [facility] does not automatically confirm Postitive Unconfirmed results.  The physician may request (order) an Unconfirmed Positive result to be sent out for confirmation.      Negative Thresholds for Drugs Screened:    THC screen, urine                          50 ng/ml  Phenycyclidine (PCP), urine                25 ng/ml  Cocaine screen, urine                     150 ng/ml  Methamphetamine, urine                    500 ng/ml  Opiate screen, urine                      100 ng/ml  Amphetamine screen, urine                 500 ng/ml  Benzodiazepine screen, urine              150 ng/ml  Tricyclic Antidepressants screen, urine   300 ng/ml  Methadone screen, urine                   200 ng/ml  Barbiturates screen, urine                200 ng/ml  Oxycodone screen, urine                   100 ng/ml  Propoxyphene screen, urine                300 ng/ml  Buprenorphine screen, urine                10 ng/ml    Comprehensive Metabolic Panel [[id number]]  (Abnormal) Collected: 12/07/21 0421    Specimen: Blood Updated: 12/07/21 0538     Glucose 165 mg/dL      BUN 12 mg/dL      Creatinine 0.53 mg/dL      Sodium 136 mmol/L      Potassium 4.3 mmol/L      Chloride 103  mmol/L      CO2 23.7 mmol/L      Calcium 8.7 mg/dL      Total Protein 7.1 g/dL      Albumin 3.30 g/dL      ALT (SGPT) 53 U/L      AST (SGOT) 44 U/L      Alkaline Phosphatase 124 U/L      Total Bilirubin 0.4 mg/dL      eGFR Non African Amer 128 mL/min/1.73      Globulin 3.8 gm/dL      A/G Ratio 0.9 g/dL      BUN/Creatinine Ratio 22.6     Anion Gap 9.3 mmol/L     Narrative:      GFR Normal >60  Chronic Kidney Disease <60  Kidney Failure <15      Procalcitonin [[id number]]  (Normal) Collected: 12/07/21 0421    Specimen: Blood Updated: 12/07/21 0529     Procalcitonin 0.03 ng/mL     Narrative:      Results may be falsely decreased if patient taking Biotin.     CBC (No Diff) [[id number]]  (Abnormal) Collected: 12/06/21 0332    Specimen: Blood Updated: 12/06/21 0424     WBC 4.06 10*3/mm3      RBC 4.37 10*6/mm3      Hemoglobin 12.8 g/dL      Hematocrit 40.0 %      MCV 91.5 fL      MCH 29.3 pg      MCHC 32.0 g/dL      RDW 11.9 %      RDW-SD 40.5 fl      MPV 11.1 fL      Platelets 365 10*3/mm3     Manual Differential [[id number]]  (Abnormal) Collected: 12/05/21 0613    Specimen: Blood Updated: 12/05/21 0714     Neutrophil % 62.0 %      Lymphocyte % 15.0 %      Monocyte % 16.0 %      Eosinophil % 3.0 %      Basophil % 1.0 %      Atypical Lymphocyte % 3.0 %      Neutrophils Absolute 3.26 10*3/mm3      Lymphocytes Absolute 0.95 10*3/mm3      Monocytes Absolute 0.84 10*3/mm3      Eosinophils Absolute 0.16 10*3/mm3      Basophils Absolute 0.05 10*3/mm3      RBC Morphology Normal     WBC Morphology Normal     Large Platelets Slight/1+    CBC Auto Differential [[id number]]  (Abnormal) Collected: 12/05/21 0613    Specimen: Blood Updated: 12/05/21 0638     WBC 5.25 10*3/mm3      RBC 4.52 10*6/mm3      Hemoglobin 13.4 g/dL      Hematocrit 41.7 %      MCV 92.3 fL      MCH 29.6 pg      MCHC 32.1 g/dL      RDW 12.2 %      RDW-SD 41.1 fl      MPV 10.1 fL      Platelets 305 10*3/mm3      Neutrophil % 59.8 %      Lymphocyte % 23.2 %       Monocyte % 15.2 %      Eosinophil % 1.0 %      Basophil % 0.4 %      Immature Grans % 0.4 %      Neutrophils, Absolute 3.14 10*3/mm3      Lymphocytes, Absolute 1.22 10*3/mm3      Monocytes, Absolute 0.80 10*3/mm3      Eosinophils, Absolute 0.05 10*3/mm3      Basophils, Absolute 0.02 10*3/mm3      Immature Grans, Absolute 0.02 10*3/mm3      nRBC 0.0 /100 WBC     Lactate Dehydrogenase [[id number]]  (Abnormal) Collected: 12/04/21 2327    Specimen: Blood Updated: 12/05/21 0001      U/L     Blood Gas, Arterial - [[id number]]  (Abnormal) Collected: 12/04/21 2345    Specimen: Arterial Blood Updated: 12/04/21 2350     Site Right Radial     Mannie's Test Positive     pH, Arterial 7.471 pH units      Comment: 83 Value above reference range        pCO2, Arterial 39.0 mm Hg      pO2, Arterial 72.2 mm Hg      Comment: 84 Value below reference range        HCO3, Arterial 28.4 mmol/L      Comment: 83 Value above reference range        Base Excess, Arterial 4.5 mmol/L      Comment: 83 Value above reference range        O2 Saturation, Arterial 96.2 %      Hemoglobin, Blood Gas 13.9 g/dL      Temperature 37.0 C      Barometric Pressure for Blood Gas 744 mmHg      Modality Nasal Cannula     Flow Rate 2.0 lpm      Ventilator Mode NA     Collected by JENNY     Comment: Meter: [id number]     :  [id number]        pCO2, Temperature Corrected 39.0 mm Hg      pH, Temp Corrected 7.471 pH Units      pO2, Temperature Corrected 72.2 mm Hg     hCG, Serum, Qualitative [[id number]]  (Normal) Collected: 12/04/21 2327    Specimen: Blood Updated: 12/04/21 2350     HCG Qualitative Negative    Lactic Acid, Plasma [[id number]]  (Normal) Collected: 12/04/21 2327    Specimen: Blood Updated: 12/04/21 2348     Lactate 1.3 mmol/L     CBC & Differential [[id number]]  (Abnormal) Collected: 12/04/21 2327    Specimen: Blood Updated: 12/04/21 2336    Narrative:      The following orders were created for panel order CBC & Differential.  Procedure                                Abnormality         Status                     ---------                               -----------         ------                     CBC Auto Differential[[id number]]        Abnormal            Final result                 Please view results for these tests on the individual orders.    CBC Auto Differential [[id number]]  (Abnormal) Collected: 12/04/21 2327    Specimen: Blood Updated: 12/04/21 2336     WBC 8.56 10*3/mm3      RBC 4.75 10*6/mm3      Hemoglobin 14.2 g/dL      Hematocrit 43.6 %      MCV 91.8 fL      MCH 29.9 pg      MCHC 32.6 g/dL      RDW 12.1 %      RDW-SD 41.0 fl      MPV 10.1 fL      Platelets 319 10*3/mm3      Neutrophil % 77.8 %      Lymphocyte % 12.0 %      Monocyte % 9.2 %      Eosinophil % 0.6 %      Basophil % 0.2 %      Immature Grans % 0.2 %      Neutrophils, Absolute 6.65 10*3/mm3      Lymphocytes, Absolute 1.03 10*3/mm3      Monocytes, Absolute 0.79 10*3/mm3      Eosinophils, Absolute 0.05 10*3/mm3      Basophils, Absolute 0.02 10*3/mm3      Immature Grans, Absolute 0.02 10*3/mm3      nRBC 0.0 /100 WBC         Imaging Results (Most Recent)     Procedure Component Value Units Date/Time    XR Chest AP [[id number]] Collected: 12/05/21 0023     Updated: 12/05/21 0026    Narrative:      CR Chest 1 Vw    INDICATION:   Cough and fever and shortness of air today. Covid positive patient     COMPARISON:    May 20    FINDINGS:  Portable AP view(s) of the chest.  There are numerous bilateral patchy infiltrates suggesting Covid 19 pneumonia. Heart size is normal. Bones are normal      Impression:      Patchy bilateral infiltrates consistent with Covid 19 pneumonia    Signer Name: Tirso Locke MD   Signed: 12/5/2021 12:23 AM   Workstation Name: [facility]    [facility]          PROCEDURES      Condition on Discharge: Stable    Physical Exam at Discharge  Vital Signs  Vitals:    12/11/21 0607   BP: 94/58   Pulse: 62   Resp: 16   Temp: 97.7 °F (36.5 °C)    SpO2: 94%       Physical Exam:  Physical Exam   Constitutional: Patient appears well-developed and well-nourished and in no acute distress.  Non-toxic appearance.   Pulmonary/Chest: No respiratory distress.   Neurological: Cranial nerves II-XII are grossly intact with no focal deficits.    Discharge Disposition  Home    Visiting Nurse:    No     Home PT/OT:  No     Home Safety Evaluation:  No     DME  Home O2    Discharge Diet:   Regular      Activity at Discharge:  As tolerated    Follow-up Appointments  Future Appointments   Date Time Provider Department Center   1/3/2022  2:30 PM Lee Tubbs MD [facility]     Additional Instructions for the Follow-ups that You Need to Schedule     Discharge Follow-up with PCP   As directed       Currently Documented PCP:    Provider, No Known    PCP Phone Number:    [phone]     Follow Up Details: Needs to establish with PCP 1-2 weeks               Test Results Pending at Discharge  None     Ben Rodríguez MD  12/14/21  00:14 EST    Time: <30 minutes    Electronically signed by Ben Rodríguez MD at 12/14/21 0015

## 2021-12-17 ENCOUNTER — READMISSION MANAGEMENT (OUTPATIENT)
Dept: CALL CENTER | Facility: HOSPITAL | Age: 41
End: 2021-12-17

## 2021-12-17 NOTE — OUTREACH NOTE
COVID-19 Week 2 Survey      Responses   [facility] patient discharged from? [place]   Does the patient have one of the following disease processes/diagnoses(primary or secondary)? COVID-19   COVID-19 underlying condition? None   Call Number Call 1   COVID-19 Week 2: Call 1 attempt successful? No   Discharge diagnosis Pneumonia due to COVID-19 virus          Blanca Baker RN

## 2021-12-23 ENCOUNTER — READMISSION MANAGEMENT (OUTPATIENT)
Dept: CALL CENTER | Facility: HOSPITAL | Age: 41
End: 2021-12-23

## 2021-12-23 NOTE — OUTREACH NOTE
COVID-19 Week 3 Survey      Responses   [facility] patient discharged from? [place]   Does the patient have one of the following disease processes/diagnoses(primary or secondary)? COVID-19   COVID-19 underlying condition? None   Call Number Call 1   COVID-19 Week 3: Call 1 attempt successful? Yes   Call start time 1230   Call end time 1233   Discharge diagnosis Pneumonia due to COVID-19 virus   Person spoke with today (if not patient) and relationship Avila Reyes son    Has the patient kept scheduled appointments due by today? N/A   Comments Sure is not sure about appt but he willask her.   Has home health visited the patient within 72 hours of discharge? N/A   What is the patient's perception of their health status since discharge? Improving  [Son says she is much better.]   Pulse Ox monitoring Intermittent   O2 Sat education comments Son is not sure about O2 sats but she is breathing better and eating/drinking. She is much better.   COVID-19 call completed? Yes          Lupe Luque RN

## 2021-12-30 ENCOUNTER — READMISSION MANAGEMENT (OUTPATIENT)
Dept: CALL CENTER | Facility: HOSPITAL | Age: 41
End: 2021-12-30

## 2021-12-30 NOTE — OUTREACH NOTE
COVID-19 Week 4 Survey      Responses   [facility] patient discharged from? [place]   Does the patient have one of the following disease processes/diagnoses(primary or secondary)? COVID-19   COVID-19 underlying condition? None   Call Number Call 1   COVID-19 Week 4: Call 1 attempt successful? No   Discharge diagnosis Pneumonia due to COVID-19 virus          Carlitos Roland RN

## 2022-01-03 ENCOUNTER — OFFICE VISIT (OUTPATIENT)
Dept: GASTROENTEROLOGY | Facility: CLINIC | Age: 42
End: 2022-01-03

## 2022-01-03 ENCOUNTER — LAB (OUTPATIENT)
Dept: LAB | Facility: HOSPITAL | Age: 42
End: 2022-01-03

## 2022-01-03 VITALS
WEIGHT: 160.4 LBS | DIASTOLIC BLOOD PRESSURE: 70 MMHG | SYSTOLIC BLOOD PRESSURE: 108 MMHG | HEIGHT: 66 IN | BODY MASS INDEX: 25.78 KG/M2

## 2022-01-03 DIAGNOSIS — B96.81 HELICOBACTER PYLORI GASTRITIS: ICD-10-CM

## 2022-01-03 DIAGNOSIS — K21.00 GASTROESOPHAGEAL REFLUX DISEASE WITH ESOPHAGITIS WITHOUT HEMORRHAGE: Primary | ICD-10-CM

## 2022-01-03 DIAGNOSIS — K29.70 HELICOBACTER PYLORI GASTRITIS: ICD-10-CM

## 2022-01-03 PROBLEM — K59.04 CHRONIC IDIOPATHIC CONSTIPATION: Status: ACTIVE | Noted: 2022-01-03

## 2022-01-03 PROCEDURE — 99213 OFFICE O/P EST LOW 20 MIN: CPT | Performed by: INTERNAL MEDICINE

## 2022-01-03 RX ORDER — PANTOPRAZOLE SODIUM 40 MG/1
40 TABLET, DELAYED RELEASE ORAL
COMMUNITY
End: 2022-04-18

## 2022-01-03 RX ORDER — SENNA PLUS 8.6 MG/1
1 TABLET ORAL DAILY
COMMUNITY
End: 2022-04-18

## 2022-01-03 RX ORDER — PANTOPRAZOLE SODIUM 40 MG/1
40 TABLET, DELAYED RELEASE ORAL 2 TIMES DAILY
Qty: 180 TABLET | Refills: 3 | Status: SHIPPED | OUTPATIENT
Start: 2022-01-03 | End: 2022-02-25 | Stop reason: SDUPTHER

## 2022-01-03 RX ORDER — ASCORBIC ACID 500 MG
500 TABLET ORAL DAILY
COMMUNITY
Start: 2021-12-16 | End: 2022-04-18

## 2022-01-03 NOTE — PROGRESS NOTES
PATIENT INFORMATION  Marilee Kraus       - 1980    CHIEF COMPLAINT  Chief Complaint   Patient presents with   • Heartburn   • Gastritis   • H Pylori       HISTORY OF PRESENT ILLNESS    A  was used through out the interview exam, though she was probably new.      Here fopr follow up after severl cancellations due to Cira's absence.     She has daily post prandial butrning despite taking daily PPI     Camron recheck her Stool Antigen and increase her PPI to BID  But none of this sounds biliary and her US and CT are negative     Despite problems in the past she is moving her bowels daily now      REVIEWED PERTINENT RESULTS/ LABS  Lab Results   Component Value Date    CASEREPORT  2021     Surgical Pathology Report                         Case: [id number]                                  Authorizing Provider:  Lee Tubbs        Collected:           2021 12:38 PM                                 MD Clement                                                                   Ordering Location:     [facility]   Received:            2021 12:55 PM                                 OR                                                                           Pathologist:           Nathan Aguirre MD                                                         Specimens:   1) - Gastric, Body                                                                                  2) - Esophagus, Distal                                                                     FINALDX  2021     1. Gastric, Body Biopsy: Benign gastric mucosa with  A. Minimally active moderate chronic inflammation.         B. Occasional Helicobacter pylori-organisms identified by routine staining.         C. Inflammatory change, no dysplasia nor malignancy identified.    2. Distal Esophagus Biopsy: Benign squamous and glandular mucosa with         A. A few eosinophils noted suggesting chronic  reflux.         B. No intestinal metaplasia identified by routine and/or alcian blue staining (see comment).         C. Reactive change noted, no dysplasia nor malignancy identified.    JAT/sms/pkm       Lab Results   Component Value Date    HGB 12.8 12/06/2021    MCV 91.5 12/06/2021     (H) 12/11/2021    ALT 54 (H) 12/11/2021    AST 29 12/11/2021    FERRITIN 229.00 (H) 12/11/2021      Walking Oximetry    Result Date: 12/10/2021  Narrative: Ajith Irby, RRT, RPFT     12/10/2021 12:49 PM Exercise Oximetry Patient Name:Marilee Kraus MRN: [id number] Date: 12/10/21         ROOM AIR BASELINE SpO2% 87 Heart Rate  Blood Pressure  EXERCISE ON ROOM AIR SpO2% EXERCISE ON O2 @ 2LPM SpO2% 1 MINUTE  1 MINUTE  92 2 MINUTES  2 MINUTES  91 3 MINUTES  3 MINUTES  88 4 MINUTES  4 MINUTES  89 5 MINUTES  5 MINUTES   6 MINUTES  6 MINUTES           Distance Walked   Distance Walked 90' Dyspnea (Erin Scale) Dyspnea (Erin Scale) 3 Fatigue (Erin Scale) Fatigue (Erin Scale)   2 SpO2% Post Exercise  SpO2% Post Exercise 91 on 2L x 2 min rest HR Post Exercise   HR Post Exercise  Time to Recovery Comments: Pt's resting SPO2 on room air was 87%. Pt placed on 2L & ambulated x 90', maintaining SPO2 > 87%. Pt tolerated well. Faroese interpretor Campbell # [id number] used.     Walking Oximetry    Result Date: 12/6/2021  Narrative: Nuha Seals, CRT     12/6/2021 12:29 PM Exercise Oximetry Patient Name:Marilee Kraus MRN: [id number] Date: 12/06/21         ROOM AIR BASELINE SpO2% 84 Heart Rate 74 Blood Pressure  EXERCISE ON ROOM AIR SpO2% EXERCISE ON O2 @ 2 LPM SpO2% 1 MINUTE  1 MINUTE sitting on side of bed  84 2 MINUTES  2 MINUTES  3 MINUTES  3 MINUTES  4 MINUTES  4 MINUTES  5 MINUTES  5 MINUTES  6 MINUTES  6 MINUTES           Distance Walked   Distance Walked Dyspnea (Erin Scale)   Dyspnea (Erin Scale) Fatigue (Erin Scale)   Fatigue (Erin Scale) SpO2% Post Exercise   SpO2% Post Exercise HR Post Exercise   HR Post Exercise Time  to Recovery   Time to Recovery Comments: At rest on 2lpm, SpO2 is 91%.  Sitting up to the side of the bed sats dropped and required 4lpm for a SpO2 of 90%.  Upon standing and walking 10ft, Pt required 6lpm to maintain a SpO2 of 89%.  Walk ended and Dr Rodríguez notified    XR Chest AP    Result Date: 12/5/2021  Narrative: CR Chest 1 Vw INDICATION: Cough and fever and shortness of air today. Covid positive patient COMPARISON:  May 20 FINDINGS: Portable AP view(s) of the chest.  There are numerous bilateral patchy infiltrates suggesting Covid 19 pneumonia. Heart size is normal. Bones are normal     Impression: Patchy bilateral infiltrates consistent with Covid 19 pneumonia Signer Name: Tirso Locke MD  Signed: 12/5/2021 12:23 AM  Workstation Name: [email or web address]  [facility]      REVIEW OF SYSTEMS  Review of Systems   Constitutional: Negative for activity change, chills, fever and unexpected weight change.   HENT: Negative for congestion.    Eyes: Negative for visual disturbance.   Respiratory: Negative for shortness of breath.    Cardiovascular: Negative for chest pain and palpitations.   Gastrointestinal: Positive for abdominal distention, abdominal pain and constipation. Negative for blood in stool.   Endocrine: Negative for cold intolerance and heat intolerance.   Genitourinary: Negative for hematuria.   Musculoskeletal: Negative for gait problem.   Skin: Negative for color change.   Allergic/Immunologic: Negative for immunocompromised state.   Neurological: Negative for weakness and light-headedness.   Hematological: Negative for adenopathy.   Psychiatric/Behavioral: Negative for sleep disturbance. The patient is not nervous/anxious.          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.831]    • Pneumonia due to COVID-19 virus [U07.1, J12.82]     • Gastroesophageal reflux disease with esophagitis without hemorrhage [K21.00]    • Epigastric pain [R10.13]    • S/P LEEP [Z98.890]    • Dysplasia of cervix, high grade RUMA 2 [N87.1]    • Breast pain [N64.4]    • ASCUS with positive high risk HPV cervical [R87.610, R87.810]          PAST MEDICAL HISTORY  Past Medical History:   Diagnosis Date   • Abnormal Pap smear of cervix 2018    ASCUS + HPV =- no follow up   • Cervical dysplasia     RUMA 2 on bx, neg LEEP   • S/P LEEP 2020         SURGICAL HISTORY  Past Surgical History:   Procedure Laterality Date   • CERVICAL BIOPSY  W/ LOOP ELECTRODE EXCISION     •  SECTION      placenta previa   •  SECTION     •  SECTION     • ENDOSCOPY N/A 2021    Procedure: ESOPHAGOGASTRODUODENOSCOPY with biopsies;  Surgeon: Lee Tubbs MD;  Location: [facility];  Service: Gastroenterology;  Laterality: N/A;  Reflux  Biopsies: gastric, distal esophagus   • LEEP N/A 2020    Procedure: LOOP ELECTROCAUTERY EXCISION PROCEDURE;  Surgeon: Sarah Berger MD;  Location: [facility];  Service: Obstetrics/Gynecology;  Laterality: N/A;         FAMILY HISTORY  Family History   Problem Relation Age of Onset   • Breast cancer Neg Hx    • Ovarian cancer Neg Hx    • Uterine cancer Neg Hx    • Colon cancer Neg Hx    • Prostate cancer Neg Hx    • Deep vein thrombosis Neg Hx          SOCIAL HISTORY  Social History     Occupational History   • Occupation: unemployed   Tobacco Use   • Smoking status: Never Smoker   • Smokeless tobacco: Never Used   Vaping Use   • Vaping Use: Never used   Substance and Sexual Activity   • Alcohol use: Never   • Drug use: Never   • Sexual activity: Yes     Partners: Male     Birth control/protection: Condom         CURRENT MEDICATIONS    Current Outpatient Medications:   •  ascorbic acid (VITAMIN C) 500 MG tablet, Take 500 mg by mouth Daily., Disp: , Rfl:   •  benzonatate (TESSALON) 200 MG capsule, Take  "1 capsule by mouth 3 (Three) Times a Day As Needed for Cough., Disp: 10 capsule, Rfl: 0  •  pantoprazole (PROTONIX) 40 MG EC tablet, Take 40 mg by mouth 2 (Two) Times a Day Before Meals., Disp: , Rfl:   •  senna (senna) 8.6 MG tablet, Take 1 tablet by mouth Daily., Disp: , Rfl:   •  Zinc 50 MG capsule, Take  by mouth Daily., Disp: , Rfl:   •  pantoprazole (PROTONIX) 40 MG EC tablet, Take 1 tablet by mouth 2 (Two) Times a Day., Disp: 180 tablet, Rfl: 3    ALLERGIES  Amoxapine and Amoxicillin    VITALS  Vitals:    01/03/22 1513   BP: 108/70   BP Location: Left arm   Patient Position: Sitting   Cuff Size: Large Adult   Weight: 72.8 kg (160 lb 6.4 oz)   Height: 167.6 cm (66\")       PHYSICAL EXAM  Debilities/Disabilities Identified: None  Emotional Behavior: Appropriate  Wt Readings from Last 3 Encounters:   01/03/22 72.8 kg (160 lb 6.4 oz)   12/05/21 70.8 kg (156 lb)   11/28/21 73.5 kg (162 lb)     Ht Readings from Last 1 Encounters:   01/03/22 167.6 cm (66\")     Body mass index is 25.89 kg/m².  Physical Exam  Constitutional:       Appearance: She is well-developed. She is not diaphoretic.   HENT:      Head: Normocephalic and atraumatic.   Eyes:      General: No scleral icterus.     Conjunctiva/sclera: Conjunctivae normal.      Pupils: Pupils are equal, round, and reactive to light.   Neck:      Thyroid: No thyromegaly.   Cardiovascular:      Rate and Rhythm: Normal rate and regular rhythm.      Heart sounds: Normal heart sounds. No murmur heard.  No gallop.    Pulmonary:      Effort: Pulmonary effort is normal.      Breath sounds: Normal breath sounds. No wheezing or rales.   Abdominal:      General: Bowel sounds are normal. There is no distension or abdominal bruit.      Palpations: Abdomen is soft. There is no shifting dullness, fluid wave or mass.      Tenderness: There is abdominal tenderness in the right upper quadrant and epigastric area. There is no guarding. Negative signs include Farmer's sign.      Hernia: " There is no hernia in the ventral area.   Musculoskeletal:         General: Normal range of motion.      Cervical back: Normal range of motion and neck supple.   Lymphadenopathy:      Cervical: No cervical adenopathy.   Skin:     General: Skin is warm and dry.      Findings: No erythema or rash.   Neurological:      Mental Status: She is alert and oriented to person, place, and time.         CLINICAL DATA REVIEWED   reviewed previous lab results and integrated with today's visit, reviewed notes from other physicians and/or last GI encounter, reviewed previous endoscopy results and available photos, reviewed surgical pathology results from previous biopsies    ASSESSMENT  Diagnoses and all orders for this visit:    Gastroesophageal reflux disease with esophagitis without hemorrhage    Helicobacter pylori gastritis  -     H. Pylori Antigen, Stool - Stool, Per Rectum    Other orders  -     pantoprazole (PROTONIX) 40 MG EC tablet; Take 40 mg by mouth 2 (Two) Times a Day Before Meals.  -     Zinc 50 MG capsule; Take  by mouth Daily.  -     ascorbic acid (VITAMIN C) 500 MG tablet; Take 500 mg by mouth Daily.  -     senna (senna) 8.6 MG tablet; Take 1 tablet by mouth Daily.  -     pantoprazole (PROTONIX) 40 MG EC tablet; Take 1 tablet by mouth 2 (Two) Times a Day.          PLAN  Recheck her HP and increase her PPI to BID  Return in about 2 months (around 3/3/2022).    I have discussed the above plan with the patient.  They verbalize understanding and are in agreement with the plan.  They have been advised to contact the office for any questions, concerns, or changes related to their health.

## 2022-01-04 ENCOUNTER — TELEPHONE (OUTPATIENT)
Dept: GASTROENTEROLOGY | Facility: CLINIC | Age: 42
End: 2022-01-04

## 2022-01-04 NOTE — TELEPHONE ENCOUNTER
Approved today  CaseId:[id number];Status:Approved;Review Type:Qty;Coverage Start Date:01/04/2022;Coverage End Date:01/04/2023;

## 2022-02-04 ENCOUNTER — LAB (OUTPATIENT)
Dept: LAB | Facility: HOSPITAL | Age: 42
End: 2022-02-04

## 2022-02-04 PROCEDURE — 87338 HPYLORI STOOL AG IA: CPT | Performed by: INTERNAL MEDICINE

## 2022-02-08 LAB — H PYLORI AG STL QL IA: NEGATIVE

## 2022-02-25 ENCOUNTER — OFFICE VISIT (OUTPATIENT)
Dept: OBSTETRICS AND GYNECOLOGY | Facility: CLINIC | Age: 42
End: 2022-02-25

## 2022-02-25 VITALS
DIASTOLIC BLOOD PRESSURE: 70 MMHG | HEIGHT: 66 IN | WEIGHT: 162 LBS | SYSTOLIC BLOOD PRESSURE: 112 MMHG | BODY MASS INDEX: 26.03 KG/M2

## 2022-02-25 DIAGNOSIS — R10.32 LLQ ABDOMINAL PAIN: ICD-10-CM

## 2022-02-25 DIAGNOSIS — Z13.89 SCREENING FOR GENITOURINARY CONDITION: Primary | ICD-10-CM

## 2022-02-25 LAB
B-HCG UR QL: NEGATIVE
BILIRUB BLD-MCNC: NEGATIVE MG/DL
CLARITY, POC: CLEAR
COLOR UR: YELLOW
EXPIRATION DATE: NORMAL
GLUCOSE UR STRIP-MCNC: NEGATIVE MG/DL
INTERNAL NEGATIVE CONTROL: NEGATIVE
INTERNAL POSITIVE CONTROL: POSITIVE
KETONES UR QL: NEGATIVE
LEUKOCYTE EST, POC: NEGATIVE
Lab: NORMAL
NITRITE UR-MCNC: NEGATIVE MG/ML
PH UR: 6 [PH] (ref 5–8)
PROT UR STRIP-MCNC: NEGATIVE MG/DL
RBC # UR STRIP: NEGATIVE /UL
SP GR UR: 1.02 (ref 1–1.03)
UROBILINOGEN UR QL: NORMAL

## 2022-02-25 PROCEDURE — 81025 URINE PREGNANCY TEST: CPT | Performed by: OBSTETRICS & GYNECOLOGY

## 2022-02-25 PROCEDURE — 81002 URINALYSIS NONAUTO W/O SCOPE: CPT | Performed by: OBSTETRICS & GYNECOLOGY

## 2022-02-25 PROCEDURE — 99213 OFFICE O/P EST LOW 20 MIN: CPT | Performed by: OBSTETRICS & GYNECOLOGY

## 2022-02-25 RX ORDER — ERGOCALCIFEROL 1.25 MG/1
CAPSULE ORAL
COMMUNITY
Start: 2022-02-22 | End: 2022-04-18

## 2022-02-25 RX ORDER — ALBUTEROL SULFATE 90 UG/1
AEROSOL, METERED RESPIRATORY (INHALATION)
COMMUNITY
Start: 2022-02-22 | End: 2022-07-13

## 2022-02-25 RX ORDER — FLUTICASONE PROPIONATE 50 MCG
SPRAY, SUSPENSION (ML) NASAL
COMMUNITY
Start: 2022-02-22 | End: 2022-04-18

## 2022-04-18 ENCOUNTER — OFFICE VISIT (OUTPATIENT)
Dept: GASTROENTEROLOGY | Facility: CLINIC | Age: 42
End: 2022-04-18

## 2022-04-18 VITALS
HEIGHT: 66 IN | SYSTOLIC BLOOD PRESSURE: 98 MMHG | BODY MASS INDEX: 26.48 KG/M2 | DIASTOLIC BLOOD PRESSURE: 78 MMHG | WEIGHT: 164.8 LBS

## 2022-04-18 DIAGNOSIS — K21.00 GASTROESOPHAGEAL REFLUX DISEASE WITH ESOPHAGITIS WITHOUT HEMORRHAGE: ICD-10-CM

## 2022-04-18 DIAGNOSIS — K59.04 CHRONIC IDIOPATHIC CONSTIPATION: ICD-10-CM

## 2022-04-18 DIAGNOSIS — R79.89 LFTS ABNORMAL: Primary | ICD-10-CM

## 2022-04-18 PROCEDURE — 99213 OFFICE O/P EST LOW 20 MIN: CPT | Performed by: INTERNAL MEDICINE

## 2022-04-18 RX ORDER — PANTOPRAZOLE SODIUM 40 MG/1
40 TABLET, DELAYED RELEASE ORAL
Qty: 180 TABLET | Refills: 3 | Status: SHIPPED | OUTPATIENT
Start: 2022-04-18 | End: 2022-09-12 | Stop reason: SDUPTHER

## 2022-04-18 NOTE — PROGRESS NOTES
PATIENT INFORMATION  Marilee Kraus       - 1980    CHIEF COMPLAINT  Chief Complaint   Patient presents with   • Heartburn          • CHRONIC IDIOPATHIC CONSTIPATION       HISTORY OF PRESENT ILLNESS  F/u of Gerd Constipation and LFTs that were elevated with Covid along with Ferritin so will screen fro [facility]    Complains of hairloss so stopped the PPI not sure if it was adding to the alopecia    Will have her start the PPI back and add daily Zinc , she states she felt better een less depressed!    Her HP stool antigen was negative.    Has had epigastric pain that respondes to tylenol!?!    Will resume her PPI      REVIEWED PERTINENT RESULTS/ LABS  Lab Results   Component Value Date    CASEREPORT  2021     Surgical Pathology Report                         Case: [id number]                                  Authorizing Provider:  Lee Tubbs        Collected:           2021 12:38 PM                                 MD Clement                                                                   Ordering Location:     [facility]   Received:            2021 12:55 PM                                 OR                                                                           Pathologist:           Nathan Aguirre MD                                                         Specimens:   1) - Gastric, Body                                                                                  2) - Esophagus, Distal                                                                     FINALDX  2021     1. Gastric, Body Biopsy: Benign gastric mucosa with  A. Minimally active moderate chronic inflammation.         B. Occasional Helicobacter pylori-organisms identified by routine staining.         C. Inflammatory change, no dysplasia nor malignancy identified.    2. Distal Esophagus Biopsy: Benign squamous and glandular mucosa with         A. A few eosinophils noted suggesting chronic  reflux.         B. No intestinal metaplasia identified by routine and/or alcian blue staining (see comment).         C. Reactive change noted, no dysplasia nor malignancy identified.    JAT/sms/pkm       Lab Results   Component Value Date    HGB 12.8 12/06/2021    MCV 91.5 12/06/2021     (H) 12/11/2021    ALT 54 (H) 12/11/2021    AST 29 12/11/2021    FERRITIN 229.00 (H) 12/11/2021      No results found.    REVIEW OF SYSTEMS  Review of Systems   Constitutional: Negative for activity change, chills, fever and unexpected weight change.   HENT: Negative for congestion.    Eyes: Negative for visual disturbance.   Respiratory: Negative for shortness of breath.    Cardiovascular: Negative for chest pain and palpitations.   Gastrointestinal: Positive for abdominal distention, abdominal pain, constipation, diarrhea, nausea and vomiting. Negative for blood in stool.   Endocrine: Negative for cold intolerance and heat intolerance.   Genitourinary: Negative for hematuria.   Musculoskeletal: Negative for gait problem.   Skin: Negative for color change.   Allergic/Immunologic: Negative for immunocompromised state.   Neurological: Negative for weakness and light-headedness.   Hematological: Negative for adenopathy.   Psychiatric/Behavioral: Negative for sleep disturbance. The patient is not nervous/anxious.          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.831]    • Pneumonia due to COVID-19 virus [U07.1, J12.82]    • Gastroesophageal reflux disease with esophagitis without hemorrhage [K21.00]    • Epigastric pain [R10.13]    • S/P LEEP [Z98.890]    • Dysplasia of cervix, high grade RUMA 2 [N87.1]    • Breast pain [N64.4]    • ASCUS with positive high risk HPV cervical [R87.610, R87.810]          PAST MEDICAL HISTORY  Past Medical History:   Diagnosis Date   •  "Abnormal Pap smear of cervix 2018    ASCUS + HPV =- no follow up   • Cervical dysplasia     RUMA 2 on bx, neg LEEP   • S/P LEEP 2020         SURGICAL HISTORY  Past Surgical History:   Procedure Laterality Date   • CERVICAL BIOPSY  W/ LOOP ELECTRODE EXCISION     •  SECTION      placenta previa   •  SECTION     •  SECTION     • ENDOSCOPY N/A 2021    Procedure: ESOPHAGOGASTRODUODENOSCOPY with biopsies;  Surgeon: Lee Tubbs MD;  Location: [facility];  Service: Gastroenterology;  Laterality: N/A;  Reflux  Biopsies: gastric, distal esophagus   • LEEP N/A 2020    Procedure: LOOP ELECTROCAUTERY EXCISION PROCEDURE;  Surgeon: Sarah Berger MD;  Location: [facility];  Service: Obstetrics/Gynecology;  Laterality: N/A;         FAMILY HISTORY  Family History   Problem Relation Age of Onset   • Breast cancer Neg Hx    • Ovarian cancer Neg Hx    • Uterine cancer Neg Hx    • Colon cancer Neg Hx    • Prostate cancer Neg Hx    • Deep vein thrombosis Neg Hx          SOCIAL HISTORY  Social History     Occupational History   • Occupation: unemployed   Tobacco Use   • Smoking status: Never Smoker   • Smokeless tobacco: Never Used   Vaping Use   • Vaping Use: Never used   Substance and Sexual Activity   • Alcohol use: Never   • Drug use: Never   • Sexual activity: Yes     Partners: Male     Birth control/protection: Condom         CURRENT MEDICATIONS    Current Outpatient Medications:   •  albuterol sulfate  (90 Base) MCG/ACT inhaler, , Disp: , Rfl:   •  Diclofenac Sodium (VOLTAREN) 1 % gel gel, , Disp: , Rfl:   •  pantoprazole (PROTONIX) 40 MG EC tablet, Take 1 tablet by mouth 2 (Two) Times a Day Before Meals., Disp: 180 tablet, Rfl: 3    ALLERGIES  Amoxapine and Amoxicillin    VITALS  Vitals:    22 1450   BP: 98/78   BP Location: Left arm   Patient Position: Sitting   Cuff Size: Large Adult   Weight: 74.8 kg (164 lb 12.8 oz)   Height: 167.6 cm (66\") " "      PHYSICAL EXAM  Debilities/Disabilities Identified: None  Emotional Behavior: Appropriate  Wt Readings from Last 3 Encounters:   04/18/22 74.8 kg (164 lb 12.8 oz)   02/25/22 73.5 kg (162 lb)   01/03/22 72.8 kg (160 lb 6.4 oz)     Ht Readings from Last 1 Encounters:   04/18/22 167.6 cm (66\")     Body mass index is 26.6 kg/m².  Physical Exam  Constitutional:       Appearance: She is well-developed. She is not diaphoretic.   HENT:      Head: Normocephalic and atraumatic.   Eyes:      General: No scleral icterus.     Conjunctiva/sclera: Conjunctivae normal.      Pupils: Pupils are equal, round, and reactive to light.   Neck:      Thyroid: No thyromegaly.   Cardiovascular:      Rate and Rhythm: Normal rate and regular rhythm.      Heart sounds: Normal heart sounds. No murmur heard.    No gallop.   Pulmonary:      Effort: Pulmonary effort is normal.      Breath sounds: Normal breath sounds. No wheezing or rales.   Abdominal:      General: Bowel sounds are normal. There is no distension or abdominal bruit.      Palpations: Abdomen is soft. There is no shifting dullness, fluid wave or mass.      Tenderness: There is abdominal tenderness in the epigastric area. There is no guarding. Negative signs include Farmer's sign.      Hernia: There is no hernia in the ventral area.   Musculoskeletal:         General: Normal range of motion.      Cervical back: Normal range of motion and neck supple.   Lymphadenopathy:      Cervical: No cervical adenopathy.   Skin:     General: Skin is warm and dry.      Findings: No erythema or rash.   Neurological:      Mental Status: She is alert and oriented to person, place, and time.   Psychiatric:         Mood and Affect: Mood normal.         Behavior: Behavior normal.         CLINICAL DATA REVIEWED   reviewed previous lab results and integrated with today's visit, reviewed notes from other physicians and/or last GI encounter, reviewed previous endoscopy results and available photos, " reviewed surgical pathology results from previous biopsies    ASSESSMENT  Diagnoses and all orders for this visit:    LFTs abnormal  -     Iron Profile  -     Comprehensive Metabolic Panel    Gastroesophageal reflux disease with esophagitis without hemorrhage    Chronic idiopathic constipation    Other orders  -     pantoprazole (PROTONIX) 40 MG EC tablet; Take 1 tablet by mouth 2 (Two) Times a Day Before Meals.          PLAN  Moving her bowels fine so check labs and recume PPI  No follow-ups on file.    I have discussed the above plan with the patient.  They verbalize understanding and are in agreement with the plan.  They have been advised to contact the office for any questions, concerns, or changes related to their health.

## 2022-04-25 ENCOUNTER — OFFICE VISIT (OUTPATIENT)
Dept: OBSTETRICS AND GYNECOLOGY | Facility: CLINIC | Age: 42
End: 2022-04-25

## 2022-04-25 ENCOUNTER — LAB (OUTPATIENT)
Dept: LAB | Facility: HOSPITAL | Age: 42
End: 2022-04-25

## 2022-04-25 VITALS
BODY MASS INDEX: 26.2 KG/M2 | SYSTOLIC BLOOD PRESSURE: 122 MMHG | WEIGHT: 163 LBS | HEIGHT: 66 IN | DIASTOLIC BLOOD PRESSURE: 70 MMHG

## 2022-04-25 DIAGNOSIS — R87.612 LOW GRADE SQUAMOUS INTRAEPITHELIAL LESION ON CYTOLOGIC SMEAR OF CERVIX (LGSIL): ICD-10-CM

## 2022-04-25 DIAGNOSIS — Z01.419 PAP SMEAR, LOW-RISK: Primary | ICD-10-CM

## 2022-04-25 DIAGNOSIS — Z11.51 SPECIAL SCREENING EXAMINATION FOR HUMAN PAPILLOMAVIRUS (HPV): ICD-10-CM

## 2022-04-25 DIAGNOSIS — Z13.89 SCREENING FOR GENITOURINARY CONDITION: ICD-10-CM

## 2022-04-25 DIAGNOSIS — Z12.31 ENCOUNTER FOR SCREENING MAMMOGRAM FOR MALIGNANT NEOPLASM OF BREAST: ICD-10-CM

## 2022-04-25 LAB
ALBUMIN SERPL-MCNC: 4.6 G/DL (ref 3.5–5.2)
ALBUMIN/GLOB SERPL: 1.8 G/DL
ALP SERPL-CCNC: 90 U/L (ref 39–117)
ALT SERPL W P-5'-P-CCNC: 22 U/L (ref 1–33)
ANION GAP SERPL CALCULATED.3IONS-SCNC: 11 MMOL/L (ref 5–15)
AST SERPL-CCNC: 19 U/L (ref 1–32)
B-HCG UR QL: NEGATIVE
BILIRUB BLD-MCNC: NEGATIVE MG/DL
BILIRUB SERPL-MCNC: 0.3 MG/DL (ref 0–1.2)
BUN SERPL-MCNC: 12 MG/DL (ref 6–20)
BUN/CREAT SERPL: 20 (ref 7–25)
CALCIUM SPEC-SCNC: 9.3 MG/DL (ref 8.6–10.5)
CHLORIDE SERPL-SCNC: 101 MMOL/L (ref 98–107)
CLARITY, POC: CLEAR
CO2 SERPL-SCNC: 25 MMOL/L (ref 22–29)
COLOR UR: YELLOW
CREAT SERPL-MCNC: 0.6 MG/DL (ref 0.57–1)
EGFRCR SERPLBLD CKD-EPI 2021: 115.8 ML/MIN/1.73
EXPIRATION DATE: NORMAL
GLOBULIN UR ELPH-MCNC: 2.6 GM/DL
GLUCOSE SERPL-MCNC: 81 MG/DL (ref 65–99)
GLUCOSE UR STRIP-MCNC: NEGATIVE MG/DL
INTERNAL NEGATIVE CONTROL: NORMAL
INTERNAL POSITIVE CONTROL: NORMAL
IRON 24H UR-MRATE: 81 MCG/DL (ref 37–145)
IRON SATN MFR SERPL: 19 % (ref 20–50)
KETONES UR QL: NEGATIVE
LEUKOCYTE EST, POC: NEGATIVE
Lab: NORMAL
NITRITE UR-MCNC: NEGATIVE MG/ML
PH UR: 5 [PH] (ref 5–8)
POTASSIUM SERPL-SCNC: 3.9 MMOL/L (ref 3.5–5.2)
PROT SERPL-MCNC: 7.2 G/DL (ref 6–8.5)
PROT UR STRIP-MCNC: NEGATIVE MG/DL
RBC # UR STRIP: NEGATIVE /UL
SODIUM SERPL-SCNC: 137 MMOL/L (ref 136–145)
SP GR UR: 1 (ref 1–1.03)
TIBC SERPL-MCNC: 422 MCG/DL (ref 298–536)
TRANSFERRIN SERPL-MCNC: 283 MG/DL (ref 200–360)
UROBILINOGEN UR QL: NORMAL

## 2022-04-25 PROCEDURE — 84466 ASSAY OF TRANSFERRIN: CPT | Performed by: INTERNAL MEDICINE

## 2022-04-25 PROCEDURE — 81025 URINE PREGNANCY TEST: CPT | Performed by: OBSTETRICS & GYNECOLOGY

## 2022-04-25 PROCEDURE — 80053 COMPREHEN METABOLIC PANEL: CPT | Performed by: INTERNAL MEDICINE

## 2022-04-25 PROCEDURE — 99213 OFFICE O/P EST LOW 20 MIN: CPT | Performed by: OBSTETRICS & GYNECOLOGY

## 2022-04-25 PROCEDURE — 83540 ASSAY OF IRON: CPT | Performed by: INTERNAL MEDICINE

## 2022-04-25 PROCEDURE — 81002 URINALYSIS NONAUTO W/O SCOPE: CPT | Performed by: OBSTETRICS & GYNECOLOGY

## 2022-04-25 PROCEDURE — 36415 COLL VENOUS BLD VENIPUNCTURE: CPT | Performed by: INTERNAL MEDICINE

## 2022-04-25 NOTE — PROGRESS NOTES
"      Marilee Kraus is a 42 y.o. patient who presents for follow up of   Chief Complaint   Patient presents with   • Follow-up     Repeat pap     41 yo est pt here for 6 month repeat pap smear. She had a ASCUS + HPV pap and RUMA 2 on bx that resulted in a LEEP in 9/2020, which was negative for dysplasia. Her ECC was negative. Her pap in 4/2021 was LGSIL + HPV and had RUMA 1 on bx. Her second 6 month repeat pap in 10/2021 was LGSIL as well. She did not show up for colpo because she got Covid. She is here today for a 6 month repeat pap. She saw GI and was dx with H pylori and is feeling better after treatment. Her pelvic pain has resolved and her cycles are normal.          The following portions of the patient's history were reviewed and updated as appropriate: allergies, current medications and problem list.    Review of Systems   Gastrointestinal: Negative for abdominal pain.   Genitourinary: Negative for pelvic pain, vaginal bleeding, vaginal discharge and vaginal pain.       /70   Ht 167.6 cm (66\")   Wt 73.9 kg (163 lb)   LMP 04/08/2022   Breastfeeding No   BMI 26.31 kg/m²     Physical Exam  Vitals and nursing note reviewed. Exam conducted with a chaperone present.   Constitutional:       Appearance: She is well-developed.   HENT:      Head: Normocephalic and atraumatic.   Eyes:      General: No scleral icterus.     Conjunctiva/sclera: Conjunctivae normal.   Neck:      Thyroid: No thyromegaly.   Abdominal:      General: There is no distension.      Palpations: Abdomen is soft. There is no mass.      Tenderness: There is no abdominal tenderness. There is no guarding or rebound.      Hernia: No hernia is present.   Genitourinary:     General: Normal vulva.      Vagina: Normal.      Cervix: Normal.      Uterus: Normal.       Adnexa: Right adnexa normal and left adnexa normal.   Skin:     General: Skin is warm and dry.   Neurological:      Mental Status: She is alert and oriented to person, place, and " time.   Psychiatric:         Mood and Affect: Mood normal.         Behavior: Behavior normal.         Thought Content: Thought content normal.         Judgment: Judgment normal.         A/P:  1. H/O abnormal pap smear- check pap/HPV today. Will call with results and needed follow up.   2. sched MMG 7/2022  3. RHM -RTO 11/2022 annual or prn.     Assessment/Plan   Diagnoses and all orders for this visit:    1. Pap smear, low-risk (Primary)  -     IgP, Aptima HPV    2. Screening for genitourinary condition  -     POC Pregnancy, Urine  -     POC Urinalysis Dipstick    3. Special screening examination for human papillomavirus (HPV)  -     IgP, Aptima HPV    4. Low grade squamous intraepithelial lesion on cytologic smear of cervix (LGSIL)    5. Encounter for screening mammogram for malignant neoplasm of breast  -     Mammo Screening Bilateral With CAD; Future                 No follow-ups on file.      Sarah Berger MD    4/25/2022  13:50 EDT

## 2022-04-29 LAB
CYTOLOGIST CVX/VAG CYTO: ABNORMAL
CYTOLOGY CVX/VAG DOC CYTO: ABNORMAL
CYTOLOGY CVX/VAG DOC THIN PREP: ABNORMAL
DX ICD CODE: ABNORMAL
DX ICD CODE: ABNORMAL
HIV 1 & 2 AB SER-IMP: ABNORMAL
HPV I/H RISK 4 DNA CVX QL PROBE+SIG AMP: POSITIVE
OTHER STN SPEC: ABNORMAL
PATHOLOGIST CVX/VAG CYTO: ABNORMAL
RECOM F/U CVX/VAG CYTO: ABNORMAL
STAT OF ADQ CVX/VAG CYTO-IMP: ABNORMAL

## 2022-06-02 ENCOUNTER — OFFICE VISIT (OUTPATIENT)
Dept: OBSTETRICS AND GYNECOLOGY | Facility: CLINIC | Age: 42
End: 2022-06-02

## 2022-06-02 VITALS
HEIGHT: 66 IN | BODY MASS INDEX: 26.36 KG/M2 | DIASTOLIC BLOOD PRESSURE: 68 MMHG | WEIGHT: 164 LBS | SYSTOLIC BLOOD PRESSURE: 110 MMHG

## 2022-06-02 DIAGNOSIS — Z13.89 SCREENING FOR GENITOURINARY CONDITION: ICD-10-CM

## 2022-06-02 DIAGNOSIS — R87.612 LOW GRADE SQUAMOUS INTRAEPITHELIAL LESION ON CYTOLOGIC SMEAR OF CERVIX (LGSIL): Primary | ICD-10-CM

## 2022-06-02 LAB
B-HCG UR QL: NEGATIVE
EXPIRATION DATE: NORMAL
INTERNAL NEGATIVE CONTROL: NEGATIVE
INTERNAL POSITIVE CONTROL: POSITIVE
Lab: NORMAL

## 2022-06-02 PROCEDURE — 57454 BX/CURETT OF CERVIX W/SCOPE: CPT | Performed by: OBSTETRICS & GYNECOLOGY

## 2022-06-02 PROCEDURE — 81025 URINE PREGNANCY TEST: CPT | Performed by: OBSTETRICS & GYNECOLOGY

## 2022-06-02 RX ORDER — CETIRIZINE HYDROCHLORIDE 10 MG/1
10 TABLET, FILM COATED ORAL DAILY
COMMUNITY
Start: 2022-05-17 | End: 2022-07-13

## 2022-06-02 RX ORDER — FLUTICASONE PROPIONATE 50 MCG
2 SPRAY, SUSPENSION (ML) NASAL DAILY
COMMUNITY
Start: 2022-05-17 | End: 2022-09-12

## 2022-06-02 NOTE — PROGRESS NOTES
Colposcopy Procedure Note    Procedures          Indications: Most recent Pap smear showed: low-grade squamous intraepithelial neoplasia (LGSIL - encompassing HPV,mild dysplasia,RUMA I), + HPV 4/2022. She had an abnormal pap off and on since 2018.     Prior cervical/vaginal disease: RUMA 2  Prior cervical treatment: LLETZ.  Contraception: condoms    Procedure Details   The risks and benefits of the procedure and Verbal informed consent obtained.  Pregnancy test: negative    Speculum placed in vagina and excellent visualization of cervix achieved, cervix swabbed x 3 with acetic acid solution and Lugol's solution     Findings:  Cervix: no punctation, no abnormal vasculature, visible lesion(s) at 2  o'clock and acetowhite lesion(s) noted at 2, 4, 7 o'clock; cervix swabbed with Lugol's solution, endocervical curettage performed, cervical biopsies taken at 2, 4, 7, o'clock, specimen labelled and sent to pathology and hemostasis achieved with Monsel's solution.  Vaginal inspection: normal without visible lesions.  Vulvar colposcopy: vulvar colposcopy not performed.  Colpo adequacy: was adquate    Specimens: 2, 4, 7, and ECC    Colpo impression: RUMA 2    Complications: none.   Patient tolerated procedure well.     Smoking Status: No      Plan:  Specimens labelled and sent to Pathology.  Will base further treatment on Pathology findings.  Treatment options discussed with patient.  Post biopsy instructions given to patient.  Return to discuss Pathology results in 2 weeks to discuss possible CKC    Sarah Berger MD   6/2/2022  14:06 EDT

## 2022-06-06 LAB
DX ICD CODE: NORMAL
DX ICD CODE: NORMAL
PATH REPORT.FINAL DX SPEC: NORMAL
PATH REPORT.GROSS SPEC: NORMAL
PATH REPORT.RELEVANT HX SPEC: NORMAL
PATH REPORT.SITE OF ORIGIN SPEC: NORMAL
PATHOLOGIST NAME: NORMAL
PAYMENT PROCEDURE: NORMAL

## 2022-06-16 ENCOUNTER — OFFICE VISIT (OUTPATIENT)
Dept: OBSTETRICS AND GYNECOLOGY | Facility: CLINIC | Age: 42
End: 2022-06-16

## 2022-06-16 ENCOUNTER — TELEPHONE (OUTPATIENT)
Dept: OBSTETRICS AND GYNECOLOGY | Facility: CLINIC | Age: 42
End: 2022-06-16

## 2022-06-16 VITALS
HEIGHT: 66 IN | SYSTOLIC BLOOD PRESSURE: 118 MMHG | DIASTOLIC BLOOD PRESSURE: 64 MMHG | WEIGHT: 161 LBS | BODY MASS INDEX: 25.88 KG/M2

## 2022-06-16 DIAGNOSIS — R87.618 OTHER ABNORMAL CYTOLOGICAL FINDING OF SPECIMEN FROM CERVIX: Primary | ICD-10-CM

## 2022-06-16 DIAGNOSIS — Z01.818 PREOPERATIVE EXAM FOR GYNECOLOGIC SURGERY: ICD-10-CM

## 2022-06-16 PROCEDURE — 99214 OFFICE O/P EST MOD 30 MIN: CPT | Performed by: OBSTETRICS & GYNECOLOGY

## 2022-06-16 NOTE — PROGRESS NOTES
"      Marilee Kraus is a 42 y.o. patient who presents for follow up of   Chief Complaint   Patient presents with   • Pre-op Exam     CKC       41 yo est pt here for preop CKC. She has had abnormal paps off and on since . In 2018 she had an ASCUS + HPV with no followup until 2020. Her pap was again ASCUS + HPV and she had RUMA 2 on biopsy.  She was interested in future pregnancy and a LEEP was more cervical sparing so she underwent a LEEP in 2020 that did not show dysplasia. . Her pap in 2021 was LGSIL + HPV and had RUMA 1 on bx. Her second 6 month repeat pap in 10/2021 was LGSIL as well. She did not show up for colpo because she got Covid. Her repeat pap in 2022 was LGSIL and had RUMA 1 on bx. She has decided that she is not interested in future pregnancy and so we discussed an excisional procedure that would evaluate the endocervical canal. We discussed the R/B/A of CKC at length , including the possibility of negative path again and r/o  labor in future pregnancies.       The following portions of the patient's history were reviewed and updated as appropriate: allergies, current medications and problem list.    Review of Systems   Constitutional: Positive for activity change.   Genitourinary: Negative for pelvic pain, vaginal bleeding and vaginal discharge.   Psychiatric/Behavioral: The patient is nervous/anxious.        /64   Ht 167.6 cm (66\")   Wt 73 kg (161 lb)   LMP 2022   BMI 25.99 kg/m²     Physical Exam  Vitals and nursing note reviewed.   Constitutional:       Appearance: Normal appearance. She is well-developed and normal weight.   HENT:      Head: Normocephalic and atraumatic.   Eyes:      General: No scleral icterus.     Conjunctiva/sclera: Conjunctivae normal.   Neck:      Thyroid: No thyromegaly.   Cardiovascular:      Rate and Rhythm: Normal rate and regular rhythm.   Pulmonary:      Effort: Pulmonary effort is normal.      Breath sounds: Normal breath sounds. "   Abdominal:      General: Bowel sounds are normal. There is no distension.      Palpations: Abdomen is soft. There is no mass.      Tenderness: There is no abdominal tenderness. There is no guarding or rebound.      Hernia: No hernia is present.   Musculoskeletal:      Cervical back: Neck supple.   Skin:     General: Skin is warm and dry.   Neurological:      Mental Status: She is alert and oriented to person, place, and time.   Psychiatric:         Behavior: Behavior normal.         Thought Content: Thought content normal.         Judgment: Judgment normal.         A/P:  1. Persistently abnormal pap smear- plan CKC in the OR. Risks, benefits and alternatives of the procedure were discussed, including , but not limited to: infection, bleeding, transfusion, injury to adjacent structures, laparotomy, possible nondiagnostic findings, possible findings of unexpected malignancy, reoperation, recurrent symptoms, thromboembolic events, anaeasthetic complications and death. Pre/intra/postop course was reviewed and all questions answered. Patient was encouraged to call for any additional questions she might have in the future.   2. Time Spent: I spent > 30 minutes caring for Marilee on this date of service. This time includes time spent by me in the following activities: preparing for the visit, reviewing tests, obtaining and/or reviewing a separately obtained history, performing a medically appropriate examination and/or evaluation, counseling and educating the patient/family/caregiver, ordering medications, tests, or procedures, documenting information in the medical record and care coordination.       Assessment & Plan   Diagnoses and all orders for this visit:    1. Other abnormal cytological finding of specimen from cervix (Primary)    2. Preoperative exam for gynecologic surgery                 No follow-ups on file.      Sarah Berger MD    6/16/2022  08:31 EDT

## 2022-06-20 ENCOUNTER — PREP FOR SURGERY (OUTPATIENT)
Dept: OTHER | Facility: HOSPITAL | Age: 42
End: 2022-06-20

## 2022-06-20 DIAGNOSIS — R87.618 OTHER ABNORMAL CYTOLOGICAL FINDING OF SPECIMEN FROM CERVIX: Primary | ICD-10-CM

## 2022-06-20 RX ORDER — SODIUM CHLORIDE 0.9 % (FLUSH) 0.9 %
10 SYRINGE (ML) INJECTION EVERY 12 HOURS SCHEDULED
Status: CANCELLED | OUTPATIENT
Start: 2022-06-20

## 2022-06-20 RX ORDER — SODIUM CHLORIDE 9 MG/ML
40 INJECTION, SOLUTION INTRAVENOUS AS NEEDED
Status: CANCELLED | OUTPATIENT
Start: 2022-06-20

## 2022-06-20 RX ORDER — SODIUM CHLORIDE 0.9 % (FLUSH) 0.9 %
10 SYRINGE (ML) INJECTION AS NEEDED
Status: CANCELLED | OUTPATIENT
Start: 2022-06-20

## 2022-06-20 RX ORDER — CEFAZOLIN SODIUM 2 G/50ML
2 SOLUTION INTRAVENOUS ONCE
Status: CANCELLED | OUTPATIENT
Start: 2022-06-20 | End: 2022-06-20

## 2022-06-22 ENCOUNTER — APPOINTMENT (OUTPATIENT)
Dept: ULTRASOUND IMAGING | Facility: HOSPITAL | Age: 42
End: 2022-06-22

## 2022-06-22 ENCOUNTER — HOSPITAL ENCOUNTER (EMERGENCY)
Facility: HOSPITAL | Age: 42
Discharge: HOME | End: 2022-06-22
Attending: EMERGENCY MEDICINE | Admitting: EMERGENCY MEDICINE

## 2022-06-22 VITALS
HEART RATE: 70 BPM | WEIGHT: 150 LBS | RESPIRATION RATE: 16 BRPM | OXYGEN SATURATION: 99 % | TEMPERATURE: 99 F | BODY MASS INDEX: 27.6 KG/M2 | SYSTOLIC BLOOD PRESSURE: 108 MMHG | HEIGHT: 62 IN | DIASTOLIC BLOOD PRESSURE: 66 MMHG

## 2022-06-22 DIAGNOSIS — R10.13 EPIGASTRIC ABDOMINAL PAIN: Primary | ICD-10-CM

## 2022-06-22 DIAGNOSIS — K21.9 GASTROESOPHAGEAL REFLUX DISEASE, UNSPECIFIED WHETHER ESOPHAGITIS PRESENT: ICD-10-CM

## 2022-06-22 DIAGNOSIS — K29.00 ACUTE GASTRITIS WITHOUT HEMORRHAGE, UNSPECIFIED GASTRITIS TYPE: ICD-10-CM

## 2022-06-22 LAB
ALBUMIN SERPL-MCNC: 4.4 G/DL (ref 3.5–5.2)
ALBUMIN/GLOB SERPL: 1.6 G/DL
ALP SERPL-CCNC: 93 U/L (ref 39–117)
ALT SERPL W P-5'-P-CCNC: 34 U/L (ref 1–33)
ANION GAP SERPL CALCULATED.3IONS-SCNC: 8.8 MMOL/L (ref 5–15)
AST SERPL-CCNC: 53 U/L (ref 1–32)
B-HCG UR QL: NEGATIVE
BACTERIA UR QL AUTO: ABNORMAL /HPF
BASOPHILS # BLD AUTO: 0.02 10*3/MM3 (ref 0–0.2)
BASOPHILS NFR BLD AUTO: 0.2 % (ref 0–1.5)
BILIRUB SERPL-MCNC: 0.3 MG/DL (ref 0–1.2)
BILIRUB UR QL STRIP: NEGATIVE
BUN SERPL-MCNC: 8 MG/DL (ref 6–20)
BUN/CREAT SERPL: 11.3 (ref 7–25)
CALCIUM SPEC-SCNC: 8.9 MG/DL (ref 8.6–10.5)
CHLORIDE SERPL-SCNC: 103 MMOL/L (ref 98–107)
CLARITY UR: CLEAR
CO2 SERPL-SCNC: 27.2 MMOL/L (ref 22–29)
COLOR UR: YELLOW
CREAT SERPL-MCNC: 0.71 MG/DL (ref 0.57–1)
DEPRECATED RDW RBC AUTO: 46.1 FL (ref 37–54)
EGFRCR SERPLBLD CKD-EPI 2021: 109.7 ML/MIN/1.73
EOSINOPHIL # BLD AUTO: 0.09 10*3/MM3 (ref 0–0.4)
EOSINOPHIL NFR BLD AUTO: 0.9 % (ref 0.3–6.2)
ERYTHROCYTE [DISTWIDTH] IN BLOOD BY AUTOMATED COUNT: 13 % (ref 12.3–15.4)
GLOBULIN UR ELPH-MCNC: 2.8 GM/DL
GLUCOSE SERPL-MCNC: 130 MG/DL (ref 65–99)
GLUCOSE UR STRIP-MCNC: NEGATIVE MG/DL
HCT VFR BLD AUTO: 42.1 % (ref 34–46.6)
HGB BLD-MCNC: 13.3 G/DL (ref 12–15.9)
HGB UR QL STRIP.AUTO: ABNORMAL
HYALINE CASTS UR QL AUTO: ABNORMAL /LPF
IMM GRANULOCYTES # BLD AUTO: 0.01 10*3/MM3 (ref 0–0.05)
IMM GRANULOCYTES NFR BLD AUTO: 0.1 % (ref 0–0.5)
KETONES UR QL STRIP: NEGATIVE
LEUKOCYTE ESTERASE UR QL STRIP.AUTO: NEGATIVE
LIPASE SERPL-CCNC: 21 U/L (ref 13–60)
LYMPHOCYTES # BLD AUTO: 1.86 10*3/MM3 (ref 0.7–3.1)
LYMPHOCYTES NFR BLD AUTO: 18 % (ref 19.6–45.3)
MCH RBC QN AUTO: 29.8 PG (ref 26.6–33)
MCHC RBC AUTO-ENTMCNC: 31.6 G/DL (ref 31.5–35.7)
MCV RBC AUTO: 94.4 FL (ref 79–97)
MONOCYTES # BLD AUTO: 0.62 10*3/MM3 (ref 0.1–0.9)
MONOCYTES NFR BLD AUTO: 6 % (ref 5–12)
NEUTROPHILS NFR BLD AUTO: 7.74 10*3/MM3 (ref 1.7–7)
NEUTROPHILS NFR BLD AUTO: 74.8 % (ref 42.7–76)
NITRITE UR QL STRIP: NEGATIVE
PH UR STRIP.AUTO: <=5 [PH] (ref 4.5–8)
PLATELET # BLD AUTO: 305 10*3/MM3 (ref 140–450)
PMV BLD AUTO: 10.2 FL (ref 6–12)
POTASSIUM SERPL-SCNC: 3.8 MMOL/L (ref 3.5–5.2)
PROT SERPL-MCNC: 7.2 G/DL (ref 6–8.5)
PROT UR QL STRIP: NEGATIVE
QT INTERVAL: 377 MS
RBC # BLD AUTO: 4.46 10*6/MM3 (ref 3.77–5.28)
RBC # UR STRIP: ABNORMAL /HPF
REF LAB TEST METHOD: ABNORMAL
SODIUM SERPL-SCNC: 139 MMOL/L (ref 136–145)
SP GR UR STRIP: 1.03 (ref 1–1.03)
SQUAMOUS #/AREA URNS HPF: ABNORMAL /HPF
TROPONIN T SERPL-MCNC: <0.01 NG/ML (ref 0–0.03)
UROBILINOGEN UR QL STRIP: ABNORMAL
WBC # UR STRIP: ABNORMAL /HPF
WBC NRBC COR # BLD: 10.34 10*3/MM3 (ref 3.4–10.8)

## 2022-06-22 PROCEDURE — 81025 URINE PREGNANCY TEST: CPT | Performed by: EMERGENCY MEDICINE

## 2022-06-22 PROCEDURE — 96374 THER/PROPH/DIAG INJ IV PUSH: CPT

## 2022-06-22 PROCEDURE — 81001 URINALYSIS AUTO W/SCOPE: CPT | Performed by: EMERGENCY MEDICINE

## 2022-06-22 PROCEDURE — 99284 EMERGENCY DEPT VISIT MOD MDM: CPT

## 2022-06-22 PROCEDURE — 85025 COMPLETE CBC W/AUTO DIFF WBC: CPT | Performed by: EMERGENCY MEDICINE

## 2022-06-22 PROCEDURE — 76705 ECHO EXAM OF ABDOMEN: CPT

## 2022-06-22 PROCEDURE — 84484 ASSAY OF TROPONIN QUANT: CPT | Performed by: EMERGENCY MEDICINE

## 2022-06-22 PROCEDURE — 80053 COMPREHEN METABOLIC PANEL: CPT | Performed by: EMERGENCY MEDICINE

## 2022-06-22 PROCEDURE — 93005 ELECTROCARDIOGRAM TRACING: CPT | Performed by: EMERGENCY MEDICINE

## 2022-06-22 PROCEDURE — 93010 ELECTROCARDIOGRAM REPORT: CPT | Performed by: INTERNAL MEDICINE

## 2022-06-22 PROCEDURE — 99283 EMERGENCY DEPT VISIT LOW MDM: CPT | Performed by: EMERGENCY MEDICINE

## 2022-06-22 PROCEDURE — 83690 ASSAY OF LIPASE: CPT | Performed by: EMERGENCY MEDICINE

## 2022-06-22 RX ORDER — SUCRALFATE 1 G/1
1 TABLET ORAL
Qty: 21 TABLET | Refills: 0 | Status: SHIPPED | OUTPATIENT
Start: 2022-06-22 | End: 2022-07-13

## 2022-06-22 RX ORDER — SODIUM CHLORIDE 0.9 % (FLUSH) 0.9 %
10 SYRINGE (ML) INJECTION AS NEEDED
Status: DISCONTINUED | OUTPATIENT
Start: 2022-06-22 | End: 2022-06-22 | Stop reason: HOSPADM

## 2022-06-22 RX ORDER — LIDOCAINE HYDROCHLORIDE 20 MG/ML
15 SOLUTION OROPHARYNGEAL ONCE
Status: COMPLETED | OUTPATIENT
Start: 2022-06-22 | End: 2022-06-22

## 2022-06-22 RX ORDER — ALUMINA, MAGNESIA, AND SIMETHICONE 2400; 2400; 240 MG/30ML; MG/30ML; MG/30ML
20 SUSPENSION ORAL ONCE
Status: COMPLETED | OUTPATIENT
Start: 2022-06-22 | End: 2022-06-22

## 2022-06-22 RX ORDER — FAMOTIDINE 10 MG/ML
20 INJECTION, SOLUTION INTRAVENOUS ONCE
Status: COMPLETED | OUTPATIENT
Start: 2022-06-22 | End: 2022-06-22

## 2022-06-22 RX ADMIN — ALUMINUM HYDROXIDE, MAGNESIUM HYDROXIDE, AND DIMETHICONE 20 ML: 400; 400; 40 SUSPENSION ORAL at 07:43

## 2022-06-22 RX ADMIN — LIDOCAINE HYDROCHLORIDE 15 ML: 20 SOLUTION ORAL; TOPICAL at 07:43

## 2022-06-22 RX ADMIN — FAMOTIDINE 20 MG: 10 INJECTION, SOLUTION INTRAVENOUS at 07:44

## 2022-06-22 NOTE — ED PROVIDER NOTES
EMERGENCY DEPARTMENT ENCOUNTER    Room Number:  12/12  Date of encounter:  6/22/2022  PCP: Provider, No Known  Historian: Pt    Patient was placed in face mask during triage process. Patient was wearing facemask when I entered the room and throughout our encounter. I wore full protective equipment throughout this patient encounter including a face mask, eye protection, and gloves. Hand hygiene was performed before donning protective equipment and again following doffing of PPE after leaving the room.    HPI:  Chief Complaint: Epigastric abdominal pain  A complete HPI/ROS/PMH/PSH/SH/FH are unobtainable due to: N/A   Context: Marilee Kraus is a 42 y.o. female who presents to the ED c/o epigastric abdominal pain since waking this morning.  When the pain is very severe she has some nausea with it and feels like she needs to vomit.  No diarrhea, black or bloody stools reported.  No fevers.  No cough, chest pain or shortness of breath.  She is had similar discomfort that has not been quite as severe a few mornings in the past 2 weeks.  Pain is moderate at this time.  No clear exacerbating relieving factors otherwise noted.  Patient is on PPI twice daily as prescribed.  She denies significant use of NSAIDs at this time.      MEDICAL HISTORY REVIEW  EMR reviewed:    Gastroenterology: [facility]          Seen in GI clinic in January of this year for similar complaint.  H. pylori stool negative on 2/4/2022.    PAST MEDICAL HISTORY  Active Ambulatory Problems     Diagnosis Date Noted   • ASCUS with positive high risk HPV cervical 07/05/2017   • Breast pain 06/26/2020   • Dysplasia of cervix, high grade RUMA 2 09/21/2020   • S/P LEEP 09/29/2020   • Epigastric pain 03/08/2021   • Gastroesophageal reflux disease with esophagitis without hemorrhage 08/16/2021   • Pneumonia due to COVID-19 virus 12/05/2021   • Cytokine release syndrome, grade 1 12/11/2021   • Chronic idiopathic constipation 01/03/2022   • Helicobacter pylori  gastritis 2022     Resolved Ambulatory Problems     Diagnosis Date Noted   • Well female exam with routine gynecological exam 2018     Past Medical History:   Diagnosis Date   • Abnormal Pap smear of cervix 2018   • Cervical dysplasia          PAST SURGICAL HISTORY  Past Surgical History:   Procedure Laterality Date   • CERVICAL BIOPSY  W/ LOOP ELECTRODE EXCISION     •  SECTION      placenta previa   •  SECTION     •  SECTION     • ENDOSCOPY N/A 2021    Procedure: ESOPHAGOGASTRODUODENOSCOPY with biopsies;  Surgeon: Lee Tubbs MD;  Location: [facility];  Service: Gastroenterology;  Laterality: N/A;  Reflux  Biopsies: gastric, distal esophagus   • LEEP N/A 2020    Procedure: LOOP ELECTROCAUTERY EXCISION PROCEDURE;  Surgeon: Sarah Berger MD;  Location: [facility];  Service: Obstetrics/Gynecology;  Laterality: N/A;         FAMILY HISTORY  Family History   Problem Relation Age of Onset   • Breast cancer Neg Hx    • Ovarian cancer Neg Hx    • Uterine cancer Neg Hx    • Colon cancer Neg Hx    • Prostate cancer Neg Hx    • Deep vein thrombosis Neg Hx          SOCIAL HISTORY  Social History     Socioeconomic History   • Marital status:    • Number of children: 2   Tobacco Use   • Smoking status: Never Smoker   • Smokeless tobacco: Never Used   Vaping Use   • Vaping Use: Never used   Substance and Sexual Activity   • Alcohol use: Never   • Drug use: Never   • Sexual activity: Yes     Partners: Male     Birth control/protection: Condom         ALLERGIES  Amoxapine and Amoxicillin        REVIEW OF SYSTEMS  Review of Systems     All systems reviewed and negative except for those discussed in HPI.       PHYSICAL EXAM    I have reviewed the triage vital signs and nursing notes.    ED Triage Vitals   Temp Heart Rate Resp BP SpO2   22 0720 22 0719 22 0719 22 0719 22 0719   99 °F (37.2 °C) 75 18 103/71 100 %       Temp src Heart Rate Source Patient Position BP Location FiO2 (%)   06/22/22 0720 06/22/22 0719 -- -- --   Oral Monitor          Physical Exam    Physical Exam   Constitutional: No distress.  Uncomfortable appearing but not overtly toxic.  HENT:  Head: Normocephalic and atraumatic.   Oropharynx: Mucous membranes are moist.   Eyes: No scleral icterus. No conjunctival pallor.  Neck: Painless range of motion noted. Neck supple.   Cardiovascular: Normal rate, regular rhythm and intact distal pulses.  Pulmonary/Chest: No respiratory distress. There are no wheezes, no rhonchi, and no rales.   Abdominal: Soft. There is moderate epigastric discomfort with palpation without any rebound or rigidity.  Negative Farmer's.   Musculoskeletal: Moves all extremities equally. There is no pedal edema or calf tenderness.   Neurological: Alert.  Baseline strength and sensation noted.   Skin: Skin is pink, warm, and dry. No pallor.   Psychiatric: Mood and affect normal.   Nursing note and vitals reviewed.    LAB RESULTS  Recent Results (from the past 24 hour(s))   Comprehensive Metabolic Panel    Collection Time: 06/22/22  7:44 AM    Specimen: Blood   Result Value Ref Range    Glucose 130 (H) 65 - 99 mg/dL    BUN 8 6 - 20 mg/dL    Creatinine 0.71 0.57 - 1.00 mg/dL    Sodium 139 136 - 145 mmol/L    Potassium 3.8 3.5 - 5.2 mmol/L    Chloride 103 98 - 107 mmol/L    CO2 27.2 22.0 - 29.0 mmol/L    Calcium 8.9 8.6 - 10.5 mg/dL    Total Protein 7.2 6.0 - 8.5 g/dL    Albumin 4.40 3.50 - 5.20 g/dL    ALT (SGPT) 34 (H) 1 - 33 U/L    AST (SGOT) 53 (H) 1 - 32 U/L    Alkaline Phosphatase 93 39 - 117 U/L    Total Bilirubin 0.3 0.0 - 1.2 mg/dL    Globulin 2.8 gm/dL    A/G Ratio 1.6 g/dL    BUN/Creatinine Ratio 11.3 7.0 - 25.0    Anion Gap 8.8 5.0 - 15.0 mmol/L    eGFR 109.7 >60.0 mL/min/1.73   Lipase    Collection Time: 06/22/22  7:44 AM    Specimen: Blood   Result Value Ref Range    Lipase 21 13 - 60 U/L   Urinalysis With Microscopic If Indicated (No  Culture) - Urine, Clean Catch    Collection Time: 06/22/22  7:44 AM    Specimen: Urine, Clean Catch   Result Value Ref Range    Color, UA Yellow Yellow, Straw    Appearance, UA Clear Clear    pH, UA <=5.0 4.5 - 8.0    Specific Gravity, UA 1.029 1.003 - 1.030    Glucose, UA Negative Negative    Ketones, UA Negative Negative    Bilirubin, UA Negative Negative    Blood, UA Trace (A) Negative    Protein, UA Negative Negative    Leuk Esterase, UA Negative Negative    Nitrite, UA Negative Negative    Urobilinogen, UA 0.2 E.U./dL 0.2 - 1.0 E.U./dL   Pregnancy, Urine - Urine, Clean Catch    Collection Time: 06/22/22  7:44 AM    Specimen: Urine, Clean Catch   Result Value Ref Range    HCG, Urine QL Negative Negative   CBC Auto Differential    Collection Time: 06/22/22  7:44 AM    Specimen: Blood   Result Value Ref Range    WBC 10.34 3.40 - 10.80 10*3/mm3    RBC 4.46 3.77 - 5.28 10*6/mm3    Hemoglobin 13.3 12.0 - 15.9 g/dL    Hematocrit 42.1 34.0 - 46.6 %    MCV 94.4 79.0 - 97.0 fL    MCH 29.8 26.6 - 33.0 pg    MCHC 31.6 31.5 - 35.7 g/dL    RDW 13.0 12.3 - 15.4 %    RDW-SD 46.1 37.0 - 54.0 fl    MPV 10.2 6.0 - 12.0 fL    Platelets 305 140 - 450 10*3/mm3    Neutrophil % 74.8 42.7 - 76.0 %    Lymphocyte % 18.0 (L) 19.6 - 45.3 %    Monocyte % 6.0 5.0 - 12.0 %    Eosinophil % 0.9 0.3 - 6.2 %    Basophil % 0.2 0.0 - 1.5 %    Immature Grans % 0.1 0.0 - 0.5 %    Neutrophils, Absolute 7.74 (H) 1.70 - 7.00 10*3/mm3    Lymphocytes, Absolute 1.86 0.70 - 3.10 10*3/mm3    Monocytes, Absolute 0.62 0.10 - 0.90 10*3/mm3    Eosinophils, Absolute 0.09 0.00 - 0.40 10*3/mm3    Basophils, Absolute 0.02 0.00 - 0.20 10*3/mm3    Immature Grans, Absolute 0.01 0.00 - 0.05 10*3/mm3   Troponin    Collection Time: 06/22/22  7:44 AM    Specimen: Blood   Result Value Ref Range    Troponin T <0.010 0.000 - 0.030 ng/mL   Urinalysis, Microscopic Only - Urine, Clean Catch    Collection Time: 06/22/22  7:44 AM    Specimen: Urine, Clean Catch   Result Value Ref  Range    RBC, UA 3-5 (A) None Seen /HPF    WBC, UA None Seen None Seen /HPF    Bacteria, UA None Seen None Seen /HPF    Squamous Epithelial Cells, UA 3-6 (A) None Seen, 0-2 /HPF    Hyaline Casts, UA None Seen None Seen /LPF    Methodology Manual Light Microscopy    ECG 12 Lead    Collection Time: 06/22/22  7:48 AM   Result Value Ref Range    QT Interval 377 ms       Ordered the above labs and independently reviewed the results.        RADIOLOGY  US Gallbladder    Result Date: 6/22/2022  ULTRASOUND ABDOMEN, LIMITED, 06/22/2022  HISTORY: Epigastric pain. Symptoms one month  TECHNIQUE: Grayscale ultrasound imaging of the right upper quadrant was performed.  FINDINGS:  Pancreas unremarkable as visualized. Significant portions of the body and tail obscured from view by bowel gas and not visualized or assessed. Survey images of the liver are negative. There is no liver mass or ascites. The gallbladder is distended up to about 3.7 cm. Multiple shadowing stones are present. No reported sonographic Farmer's sign. No gallbladder wall thickening or pericholecystic fluid. Extrahepatic common bile duct measures 5 mm. The main portal vein is patent with appropriate directional flow. Liver measures 12.8 cm. Right kidney nonobstructed and measures 11 cm.       1. Borderline dilatation of the gallbladder with cholelithiasis. No additional ultrasound evidence of acute cholecystitis. No biliary ductal dilatation. 2. Limited evaluation of the pancreas.  This report was finalized on 6/22/2022 8:45 AM by Dr. Mayank Lopez MD.        I ordered the above noted radiological studies. Reviewed by me and discussed with radiologist.  See dictation for official radiology interpretation.      PROCEDURES    Procedures        MEDICATIONS GIVEN IN ER    Medications   sodium chloride 0.9 % flush 10 mL (has no administration in time range)   famotidine (PEPCID) injection 20 mg (20 mg Intravenous Given 6/22/22 0765)   aluminum-magnesium  hydroxide-simethicone (MAALOX MAX) 400-400-40 MG/5ML suspension 20 mL (20 mL Oral Given 6/22/22 0743)   Lidocaine Viscous HCl (XYLOCAINE) 2 % solution 15 mL (15 mL Mouth/Throat Given 6/22/22 0743)         PROGRESS, DATA ANALYSIS, CONSULTS, AND MEDICAL DECISION MAKING    My differential diagnosis for abdominal pain includes but is not limited to:  Gastritis, gastroenteritis, peptic ulcer disease, GERD, esophageal perforation, acute appendicitis, mesenteric adenitis, Meckel’s diverticulum, epiploic appendagitis, diverticulitis, colon cancer, ulcerative colitis, Crohn’s disease, intussusception, small bowel obstruction, adhesions, ischemic bowel, perforated viscus, ileus, obstipation, biliary colic, cholecystitis, cholelithiasis, Sudhakar-Michael Phuc, hepatitis, pancreatitis, common bile duct obstruction, cholangitis, bile leak, splenic trauma, splenic rupture, splenic infarction, splenic abscess, abdominal abscess, ascites, spontaneous bacterial peritonitis, hernia, UTI, cystitis,ureterolithiasis, urinary obstruction, ovarian cyst, torsion, pregnancy, ectopic pregnancy, PID, pelvic abscess, mittelschmerz, endometriosis, AAA, myocardial infarction, pneumonia, cancer, porphyria, DKA, medications, sickle cell, viral syndrome, zoster      All labs have been independently reviewed by me.  All radiology studies have been reviewed by me and discussed with radiologist dictating the report.   EKG's independently viewed and interpreted by me.  Discussion below represents my analysis of pertinent findings related to patient's condition, differential diagnosis, treatment plan and final disposition.      ED Course as of 06/22/22 0932   Wed Jun 22, 2022   0754 EKG           EKG time: 0748  Rhythm/Rate: Sinus, 75  P waves and CO: NICOLETTE within normal  QRS, axis: Narrow QRS complex  ST and T waves: No STEMI; QTC within normal limits    Interpreted Contemporaneously by me, independently viewed  Comparison: 9/8/2020   [RS]   0800 HCG, Urine  QL: Negative [RS]   0801 Leukocytes, UA: Negative [RS]   0801 Nitrite, UA: Negative [RS]   0827 Bacteria, UA: None Seen [RS]   0827 WBC, UA: None Seen [RS]   0827 BUN: 8 [RS]   0827 Creatinine: 0.71 [RS]   0827 Sodium: 139 [RS]   0827 Potassium: 3.8 [RS]   0827 Troponin T: <0.010 [RS]   0827 Lipase: 21 [RS]   0827 Total Bilirubin: 0.3 [RS]   0841 Laboratory evaluation reassuring.  Await gallbladder ultrasound result. [RS]   0930 I reviewed the ultrasound and lab results with the patient who is now pain-free.  We talked about gastritis and esophagitis as well as need for close outpatient gastroenterology follow-up.  Her symptoms do not get worse with food and I do not think her cholelithiasis is the source of her discomfort.  We discussed red flags which would indicate need for ED return and we will start the patient on some Carafate.  Patient agreeable with plan for discharge and close outpatient gastroenterology follow-up. [RS]      ED Course User Index  [RS] Milton Cooney MD       AS OF 09:32 EDT VITALS:    BP - 112/71  HR - 79  TEMP - 99 °F (37.2 °C) (Oral)  O2 SATS - 100%        DIAGNOSIS  Final diagnoses:   Epigastric abdominal pain   Gastroesophageal reflux disease, unspecified whether esophagitis present   Acute gastritis without hemorrhage, unspecified gastritis type         DISPOSITION  DISCHARGE    Patient discharged in stable condition.    Reviewed implications of results, diagnosis, meds, responsibility to follow up, warning signs and symptoms of possible worsening, potential complications and reasons to return to ER.    Patient/Family voiced understanding of above instructions.    Discussed plan for discharge, as there is no emergent indication for admission. Patient referred to primary care provider for regular health maintenance. Pt/family is agreeable and understands need for follow up and possible repeat testing.  Pt is aware that discharge does not mean that nothing is wrong but it indicates no  emergency is present that requires admission and they must continue care with follow-up as given below or physician of their choice.     FOLLOW-UP  Your primary care provider    Schedule an appointment as soon as possible for a visit   Regular health maintenance    [facility], Lee Vaughan MD  [address] [address] [phone]    Schedule an appointment as soon as possible for a visit   Next available         Medication List      New Prescriptions    sucralfate 1 g tablet  Commonly known as: Carafate  Take 1 tablet by mouth 3 (Three) Times a Day Before Meals. Take 1 tablet by mouth 3 times a day before meals           Where to Get Your Medications      These medications were sent to [facility] [address] [place] [address] - [address] [facility] - [phone]  - [phone] FX  [address] [facility] [address]    Phone: [phone]   · sucralfate 1 g tablet            Milton Cooney MD  06/22/22 8042

## 2022-07-13 ENCOUNTER — PRE-ADMISSION TESTING (OUTPATIENT)
Dept: PREADMISSION TESTING | Facility: HOSPITAL | Age: 42
End: 2022-07-13

## 2022-07-13 VITALS
SYSTOLIC BLOOD PRESSURE: 105 MMHG | BODY MASS INDEX: 26.03 KG/M2 | HEIGHT: 66 IN | RESPIRATION RATE: 16 BRPM | OXYGEN SATURATION: 100 % | WEIGHT: 162 LBS | HEART RATE: 80 BPM | DIASTOLIC BLOOD PRESSURE: 66 MMHG

## 2022-07-13 RX ORDER — ERGOCALCIFEROL 1.25 MG/1
50000 CAPSULE ORAL WEEKLY
COMMUNITY
End: 2023-03-13

## 2022-07-13 NOTE — DISCHARGE INSTRUCTIONS
PRE-ADMISSION TESTING INSTRUCTIONS FOR ADULTS    Take these medications the morning of surgery with a small sip of water: Protonix      Do not take any insulin or diabetes medications the morning of surgery.      No aspirin, advil, aleve, ibuprofen, naproxen, diet pills, decongestants, or herbal/vitamins for a week prior to surgery.    General Instructions:    DO NOT EAT SOLID FOOD AFTER MIDNIGHT THE NIGHT BEFORE SURGERY. No gum, mints, or hard candy after midnight the night before surgery.  You may drink clear liquids the day of surgery up until 2 hours before your arrival time.  Clear liquids are liquids you can see through. Nothing RED in color.    Plain water    Sports drinks  Sodas     Gelatin (Jell-O)  Fruit juices without pulp such as white grape juice and apple juice  Popsicles that contain no fruit or yogurt  Tea or coffee (no cream or milk added)    It is beneficial for you to have a clear drink that contains carbohydrates just before you leave your house and before your fasting time begins.  We suggest a 20 ounce bottle of Gatorade or Powerade for non-diabetic patients or a 20 ounce bottle of G2 or Powerade Zero for diabetic patients.     Patients who avoid smoking, chewing tobacco and alcohol for 4 weeks prior to surgery have a reduced risk of post-operative complications.  If at all possible, quit smoking as many days before surgery as you can.    Do not smoke, use chewing tobacco or drink alcohol the day of surgery    Bring your C-PAP/ BI-PAP machine if you use one.  Wear clean comfortable clothes and socks.  Do not wear contact lenses, lotion, deodorant, or make-up.  Bring a case for your glasses if applicable. You may brush your teeth the morning of surgery.  You may wear dentures/partials, do not put adhesive/glue on them.  Leave all other jewelry and valuables at home.      Preventing a Surgical Site Infection:    Shower the night before and on the morning of surgery using the chlorhexidine soap  you were given.  Use a clean washcloth with the soap.  Place clean sheets on your bed after showering the night before surgery. Do not use the CHG soap on your hair, face, or private areas. Wash your body gently for five (5) minutes. Do not scrub your skin.  Dry with a clean towel and dress in clean clothing.  Do not shave the surgical area for 10 days-2 weeks prior to surgery  because the razor can irritate skin and make it easier to develop an infection.  Make sure you, your family, and all healthcare providers clean their hands with soap and water or an alcohol based hand  before caring for you or your wound.      Day of surgery:    Your surgeon’s office will advise you of your arrival time for the day of surgery.    Upon arrival, a Pre-op nurse and Anesthesia provider will review your health history, obtain vital signs, and answer questions you may have.  The only belongings needed at this time will be your home medications and if applicable your C-PAP/BI-PAP machine.  If you are staying overnight your family can leave the rest of your belongings in the car and bring them to your room later.  A Pre-op nurse will start an IV and you may receive medication in preparation for surgery, including something to help you relax.  Your family will be able to see you in the Pre-op area.  While you are in surgery your family should notify the waiting room  if they leave the waiting room area and provide a contact phone number.    IF you have any questions, you can call the Pre-Admission Department at [phone] or your surgeon's office.  Notify your surgeon if  you become sick, have a fever, productive cough, or cannot be here the day of surgery    Please be aware that surgery does come with discomfort.  We want to make every effort to control your discomfort so please discuss any uncontrolled symptoms with your nurse.   Your doctor will most likely have prescribed pain medications.      If you are  going home after surgery, you will receive individualized written care instructions before being discharged.  A responsible adult (over the age of 18) must drive you to and from the hospital on the day of your surgery and stay with you for 24 hours after anesthesia.    If you are staying overnight following surgery, you will be transported to your hospital room following the recovery period.  [facility] has all private rooms.    You may receive a survey regarding the care you received. Your feedback is very important and will be used to collect the necessary data to help us to continue to provide excellent care.     Deductibles and co-payments are collected on the day of service. Please be prepared to pay the required co-pay, deductible or deposit on the day of service as defined by your plan.

## 2022-07-13 NOTE — PAT
Pt here for PAT visit.  Pre-op tests completed, chg soap given, and instructions reviewed.  Instructed clears until 2 hrs prior to arrival time, voiced understanding. Pt primary language is Qatari but speaks and understands English,  offered and pt refused, Covid 7/16

## 2022-07-16 ENCOUNTER — LAB (OUTPATIENT)
Dept: LAB | Facility: HOSPITAL | Age: 42
End: 2022-07-16

## 2022-07-16 DIAGNOSIS — R87.618 OTHER ABNORMAL CYTOLOGICAL FINDING OF SPECIMEN FROM CERVIX: ICD-10-CM

## 2022-07-16 LAB — SARS-COV-2 RNA PNL SPEC NAA+PROBE: NOT DETECTED

## 2022-07-16 PROCEDURE — C9803 HOPD COVID-19 SPEC COLLECT: HCPCS

## 2022-07-16 PROCEDURE — 87635 SARS-COV-2 COVID-19 AMP PRB: CPT | Performed by: OBSTETRICS & GYNECOLOGY

## 2022-07-18 ENCOUNTER — ANESTHESIA EVENT (OUTPATIENT)
Dept: PERIOP | Facility: HOSPITAL | Age: 42
End: 2022-07-18

## 2022-07-18 PROCEDURE — S0260 H&P FOR SURGERY: HCPCS | Performed by: OBSTETRICS & GYNECOLOGY

## 2022-07-18 NOTE — H&P
Patient Care Team:  Sarah Berger MD as PCP - General (Obstetrics and Gynecology)  Shruthi Martinez MD (Internal Medicine)    Chief complaint persistently abnormal paps       HPI:   41 yo est pt here for [facility]. She has had abnormal paps off and on since . In 2018 she had an ASCUS + HPV with no followup until 2020. Her pap was again ASCUS + HPV and she had RUMA 2 on biopsy.  She was interested in future pregnancy and a LEEP was more cervical sparing so she underwent a LEEP in 2020 that did not show dysplasia. . Her pap in 2021 was LGSIL + HPV and had RUMA 1 on bx. Her second 6 month repeat pap in 10/2021 was LGSIL as well. She did not show up for colpo because she got Covid. Her repeat pap in 2022 was LGSIL and had RUMA 1 on bx. She has decided that she is not interested in future pregnancy and so we discussed an excisional procedure that would evaluate the endocervical canal. We discussed the R/B/A of [facility] at length , including the possibility of negative path again and r/o  labor in future pregnancies.       PMH:   Past Medical History:   Diagnosis Date   • Abnormal Pap smear of cervix 2018    ASCUS + HPV =- no follow up   • Cervical dysplasia     RUMA 2 on bx, neg LEEP   • GERD (gastroesophageal reflux disease)    • S/P LEEP 2020         PSH:   Past Surgical History:   Procedure Laterality Date   • CERVICAL BIOPSY  W/ LOOP ELECTRODE EXCISION     •  SECTION      placenta previa   •  SECTION     •  SECTION     • ENDOSCOPY N/A 2021    Procedure: ESOPHAGOGASTRODUODENOSCOPY with biopsies;  Surgeon: Lee Tubbs MD;  Location: [facility] OR;  Service: Gastroenterology;  Laterality: N/A;  Reflux  Biopsies: gastric, distal esophagus   • LEEP N/A 2020    Procedure: LOOP ELECTROCAUTERY EXCISION PROCEDURE;  Surgeon: Sarah Berger MD;  Location: [facility] OR;  Service: Obstetrics/Gynecology;  Laterality: N/A;       SoHx:    Social History     Socioeconomic History   • Marital status:    • Number of children: 2   Tobacco Use   • Smoking status: Never Smoker   • Smokeless tobacco: Never Used   Vaping Use   • Vaping Use: Never used   Substance and Sexual Activity   • Alcohol use: Never   • Drug use: Never   • Sexual activity: Yes     Partners: Male     Birth control/protection: Condom       FHx:   Family History   Problem Relation Age of Onset   • Breast cancer Neg Hx    • Ovarian cancer Neg Hx    • Uterine cancer Neg Hx    • Colon cancer Neg Hx    • Prostate cancer Neg Hx    • Deep vein thrombosis Neg Hx      OB History         2    Para   2    Term   2       0    AB   0    Living   2        SAB   0    IAB   0    Ectopic   0    Molar        Multiple   0    Live Births   2           Obstetric Comments   Considering pregnancy                Menarche: 15  Current contraception: none  History of abnormal Pap smear: yes- LEEP 2020- neg path  History of abnormal mammogram: no  Family history of uterine, colon or ovarian cancer: no  Family history of breast cancer: no  H/o STDs: none  Last pap:2022- LGSIL + HPV, RUMA 1 on bx  Gardasil:missed  JIGNA: none          Allergies: Amoxapine and Amoxicillin    Medications:   No current facility-administered medications on file prior to encounter.     Current Outpatient Medications on File Prior to Encounter   Medication Sig Dispense Refill   • Diclofenac Sodium (VOLTAREN) 1 % gel gel      • fluticasone (FLONASE) 50 MCG/ACT nasal spray 2 sprays Daily.     • pantoprazole (PROTONIX) 40 MG EC tablet Take 1 tablet by mouth 2 (Two) Times a Day Before Meals. 180 tablet 3       There were no vitals taken for this visit.    Review of Systems   Constitutional: Positive for activity change.   Genitourinary: Negative for pelvic pain, vaginal bleeding and vaginal discharge.   Psychiatric/Behavioral: The patient is nervous/anxious.        Physical Exam  Vitals and nursing note reviewed.    Constitutional:       Appearance: Normal appearance. She is well-developed and normal weight.   HENT:      Head: Normocephalic and atraumatic.   Eyes:      General: No scleral icterus.     Conjunctiva/sclera: Conjunctivae normal.   Neck:      Thyroid: No thyromegaly.   Cardiovascular:      Rate and Rhythm: Normal rate and regular rhythm.   Pulmonary:      Effort: Pulmonary effort is normal.      Breath sounds: Normal breath sounds.   Abdominal:      General: Bowel sounds are normal. There is no distension.      Palpations: Abdomen is soft. There is no mass.      Tenderness: There is no abdominal tenderness. There is no guarding or rebound.      Hernia: No hernia is present.   Musculoskeletal:      Cervical back: Neck supple.   Skin:     General: Skin is warm and dry.   Neurological:      Mental Status: She is alert and oriented to person, place, and time.   Psychiatric:         Behavior: Behavior normal.         Thought Content: Thought content normal.         Judgment: Judgment normal.         Debilities/Disabilities Identified: None    Labs:     Lab Results (last 7 days)     ** No results found for the last 168 hours. **          Assessment/Plan:   1. Persistently abnormal pap smear- plan [facility] in the OR. Risks, benefits and alternatives of the procedure were discussed, including , but not limited to: infection, bleeding, transfusion, injury to adjacent structures, laparotomy, possible nondiagnostic findings, possible findings of unexpected malignancy, reoperation, recurrent symptoms, thromboembolic events, anaeasthetic complications and death. Pre/intra/postop course was reviewed and all questions answered. Patient was encouraged to call for any additional questions she might have in the future.       I discussed the patients findings and my recommendations with patient.     Sarah Berger MD  07/18/22  17:02 EDT

## 2022-07-19 ENCOUNTER — HOSPITAL ENCOUNTER (OUTPATIENT)
Facility: HOSPITAL | Age: 42
Setting detail: HOSPITAL OUTPATIENT SURGERY
Discharge: HOME | End: 2022-07-19
Attending: OBSTETRICS & GYNECOLOGY | Admitting: OBSTETRICS & GYNECOLOGY

## 2022-07-19 ENCOUNTER — ANESTHESIA (OUTPATIENT)
Dept: PERIOP | Facility: HOSPITAL | Age: 42
End: 2022-07-19

## 2022-07-19 VITALS
SYSTOLIC BLOOD PRESSURE: 102 MMHG | OXYGEN SATURATION: 96 % | RESPIRATION RATE: 12 BRPM | DIASTOLIC BLOOD PRESSURE: 71 MMHG | HEART RATE: 64 BPM | TEMPERATURE: 97.4 F | BODY MASS INDEX: 25.82 KG/M2 | WEIGHT: 160 LBS

## 2022-07-19 DIAGNOSIS — R87.618 OTHER ABNORMAL CYTOLOGICAL FINDING OF SPECIMEN FROM CERVIX: ICD-10-CM

## 2022-07-19 DIAGNOSIS — Z98.890 S/P CONIZATION OF CERVIX: Primary | ICD-10-CM

## 2022-07-19 LAB — HCG SERPL QL: NEGATIVE

## 2022-07-19 PROCEDURE — 25010000002 PROPOFOL 10 MG/ML EMULSION: Performed by: NURSE ANESTHETIST, CERTIFIED REGISTERED

## 2022-07-19 PROCEDURE — 25010000002 DEXAMETHASONE PER 1 MG: Performed by: NURSE ANESTHETIST, CERTIFIED REGISTERED

## 2022-07-19 PROCEDURE — 25010000002 FENTANYL CITRATE (PF) 50 MCG/ML SOLUTION: Performed by: NURSE ANESTHETIST, CERTIFIED REGISTERED

## 2022-07-19 PROCEDURE — 88305 TISSUE EXAM BY PATHOLOGIST: CPT | Performed by: OBSTETRICS & GYNECOLOGY

## 2022-07-19 PROCEDURE — 0 CEFAZOLIN SODIUM-DEXTROSE 2-3 GM-%(50ML) RECONSTITUTED SOLUTION: Performed by: OBSTETRICS & GYNECOLOGY

## 2022-07-19 PROCEDURE — 57520 CONIZATION OF CERVIX: CPT | Performed by: OBSTETRICS & GYNECOLOGY

## 2022-07-19 PROCEDURE — 25010000002 MIDAZOLAM PER 1MG: Performed by: NURSE ANESTHETIST, CERTIFIED REGISTERED

## 2022-07-19 PROCEDURE — 25010000002 KETOROLAC TROMETHAMINE PER 15 MG: Performed by: NURSE ANESTHETIST, CERTIFIED REGISTERED

## 2022-07-19 PROCEDURE — 25010000002 ONDANSETRON PER 1 MG: Performed by: NURSE ANESTHETIST, CERTIFIED REGISTERED

## 2022-07-19 PROCEDURE — 88307 TISSUE EXAM BY PATHOLOGIST: CPT | Performed by: OBSTETRICS & GYNECOLOGY

## 2022-07-19 PROCEDURE — 84703 CHORIONIC GONADOTROPIN ASSAY: CPT | Performed by: NURSE ANESTHETIST, CERTIFIED REGISTERED

## 2022-07-19 RX ORDER — MIDAZOLAM HYDROCHLORIDE 2 MG/2ML
1 INJECTION, SOLUTION INTRAMUSCULAR; INTRAVENOUS
Status: DISCONTINUED | OUTPATIENT
Start: 2022-07-19 | End: 2022-07-19 | Stop reason: HOSPADM

## 2022-07-19 RX ORDER — LIDOCAINE HYDROCHLORIDE 20 MG/ML
INJECTION, SOLUTION INFILTRATION; PERINEURAL AS NEEDED
Status: DISCONTINUED | OUTPATIENT
Start: 2022-07-19 | End: 2022-07-19 | Stop reason: SURG

## 2022-07-19 RX ORDER — IBUPROFEN 600 MG/1
600 TABLET ORAL EVERY 8 HOURS PRN
Qty: 20 TABLET | Refills: 0 | Status: SHIPPED | OUTPATIENT
Start: 2022-07-19

## 2022-07-19 RX ORDER — SODIUM CHLORIDE, SODIUM LACTATE, POTASSIUM CHLORIDE, CALCIUM CHLORIDE 600; 310; 30; 20 MG/100ML; MG/100ML; MG/100ML; MG/100ML
9 INJECTION, SOLUTION INTRAVENOUS CONTINUOUS
Status: DISCONTINUED | OUTPATIENT
Start: 2022-07-19 | End: 2022-07-19 | Stop reason: HOSPADM

## 2022-07-19 RX ORDER — ONDANSETRON 2 MG/ML
4 INJECTION INTRAMUSCULAR; INTRAVENOUS ONCE
Status: COMPLETED | OUTPATIENT
Start: 2022-07-19 | End: 2022-07-19

## 2022-07-19 RX ORDER — ONDANSETRON 2 MG/ML
4 INJECTION INTRAMUSCULAR; INTRAVENOUS ONCE AS NEEDED
Status: DISCONTINUED | OUTPATIENT
Start: 2022-07-19 | End: 2022-07-19 | Stop reason: HOSPADM

## 2022-07-19 RX ORDER — LIDOCAINE HYDROCHLORIDE 10 MG/ML
0.5 INJECTION, SOLUTION EPIDURAL; INFILTRATION; INTRACAUDAL; PERINEURAL ONCE AS NEEDED
Status: DISCONTINUED | OUTPATIENT
Start: 2022-07-19 | End: 2022-07-19 | Stop reason: HOSPADM

## 2022-07-19 RX ORDER — ACETAMINOPHEN 500 MG
500 TABLET ORAL ONCE
Status: COMPLETED | OUTPATIENT
Start: 2022-07-19 | End: 2022-07-19

## 2022-07-19 RX ORDER — SODIUM CHLORIDE 9 MG/ML
INJECTION, SOLUTION INTRAVENOUS AS NEEDED
Status: DISCONTINUED | OUTPATIENT
Start: 2022-07-19 | End: 2022-07-19 | Stop reason: HOSPADM

## 2022-07-19 RX ORDER — PROPOFOL 10 MG/ML
VIAL (ML) INTRAVENOUS AS NEEDED
Status: DISCONTINUED | OUTPATIENT
Start: 2022-07-19 | End: 2022-07-19 | Stop reason: SURG

## 2022-07-19 RX ORDER — OXYCODONE HYDROCHLORIDE AND ACETAMINOPHEN 5; 325 MG/1; MG/1
1 TABLET ORAL ONCE AS NEEDED
Status: DISCONTINUED | OUTPATIENT
Start: 2022-07-19 | End: 2022-07-19 | Stop reason: HOSPADM

## 2022-07-19 RX ORDER — SODIUM CHLORIDE 0.9 % (FLUSH) 0.9 %
10 SYRINGE (ML) INJECTION AS NEEDED
Status: DISCONTINUED | OUTPATIENT
Start: 2022-07-19 | End: 2022-07-19 | Stop reason: HOSPADM

## 2022-07-19 RX ORDER — SODIUM CHLORIDE, SODIUM LACTATE, POTASSIUM CHLORIDE, CALCIUM CHLORIDE 600; 310; 30; 20 MG/100ML; MG/100ML; MG/100ML; MG/100ML
100 INJECTION, SOLUTION INTRAVENOUS CONTINUOUS
Status: DISCONTINUED | OUTPATIENT
Start: 2022-07-19 | End: 2022-07-19 | Stop reason: HOSPADM

## 2022-07-19 RX ORDER — DEXMEDETOMIDINE HYDROCHLORIDE 100 UG/ML
INJECTION, SOLUTION INTRAVENOUS AS NEEDED
Status: DISCONTINUED | OUTPATIENT
Start: 2022-07-19 | End: 2022-07-19 | Stop reason: SURG

## 2022-07-19 RX ORDER — FENTANYL CITRATE 50 UG/ML
25 INJECTION, SOLUTION INTRAMUSCULAR; INTRAVENOUS
Status: DISCONTINUED | OUTPATIENT
Start: 2022-07-19 | End: 2022-07-19 | Stop reason: HOSPADM

## 2022-07-19 RX ORDER — MAGNESIUM HYDROXIDE 1200 MG/15ML
LIQUID ORAL AS NEEDED
Status: DISCONTINUED | OUTPATIENT
Start: 2022-07-19 | End: 2022-07-19 | Stop reason: HOSPADM

## 2022-07-19 RX ORDER — SODIUM CHLORIDE 0.9 % (FLUSH) 0.9 %
10 SYRINGE (ML) INJECTION EVERY 12 HOURS SCHEDULED
Status: DISCONTINUED | OUTPATIENT
Start: 2022-07-19 | End: 2022-07-19 | Stop reason: HOSPADM

## 2022-07-19 RX ORDER — FAMOTIDINE 10 MG/ML
20 INJECTION, SOLUTION INTRAVENOUS
Status: COMPLETED | OUTPATIENT
Start: 2022-07-19 | End: 2022-07-19

## 2022-07-19 RX ORDER — KETOROLAC TROMETHAMINE 30 MG/ML
INJECTION, SOLUTION INTRAMUSCULAR; INTRAVENOUS AS NEEDED
Status: DISCONTINUED | OUTPATIENT
Start: 2022-07-19 | End: 2022-07-19 | Stop reason: SURG

## 2022-07-19 RX ORDER — CEFAZOLIN SODIUM 2 G/50ML
2 SOLUTION INTRAVENOUS ONCE
Status: COMPLETED | OUTPATIENT
Start: 2022-07-19 | End: 2022-07-19

## 2022-07-19 RX ORDER — FENTANYL CITRATE 50 UG/ML
INJECTION, SOLUTION INTRAMUSCULAR; INTRAVENOUS AS NEEDED
Status: DISCONTINUED | OUTPATIENT
Start: 2022-07-19 | End: 2022-07-19 | Stop reason: SURG

## 2022-07-19 RX ORDER — HYDROCODONE BITARTRATE AND ACETAMINOPHEN 5; 325 MG/1; MG/1
1-2 TABLET ORAL EVERY 4 HOURS PRN
Qty: 15 TABLET | Refills: 0 | Status: SHIPPED | OUTPATIENT
Start: 2022-07-19 | End: 2022-09-12

## 2022-07-19 RX ORDER — SODIUM CHLORIDE 9 MG/ML
40 INJECTION, SOLUTION INTRAVENOUS AS NEEDED
Status: DISCONTINUED | OUTPATIENT
Start: 2022-07-19 | End: 2022-07-19 | Stop reason: HOSPADM

## 2022-07-19 RX ORDER — FERRIC SUBSULFATE 0.21 G/G
LIQUID TOPICAL AS NEEDED
Status: DISCONTINUED | OUTPATIENT
Start: 2022-07-19 | End: 2022-07-19 | Stop reason: HOSPADM

## 2022-07-19 RX ORDER — DEXAMETHASONE SODIUM PHOSPHATE 4 MG/ML
8 INJECTION, SOLUTION INTRA-ARTICULAR; INTRALESIONAL; INTRAMUSCULAR; INTRAVENOUS; SOFT TISSUE ONCE
Status: COMPLETED | OUTPATIENT
Start: 2022-07-19 | End: 2022-07-19

## 2022-07-19 RX ADMIN — FENTANYL CITRATE 50 MCG: 50 INJECTION INTRAMUSCULAR; INTRAVENOUS at 11:56

## 2022-07-19 RX ADMIN — DEXAMETHASONE SODIUM PHOSPHATE 8 MG: 4 INJECTION, SOLUTION INTRAMUSCULAR; INTRAVENOUS at 11:12

## 2022-07-19 RX ADMIN — FAMOTIDINE 20 MG: 10 INJECTION, SOLUTION INTRAVENOUS at 11:11

## 2022-07-19 RX ADMIN — PROPOFOL 50 MG: 10 INJECTION, EMULSION INTRAVENOUS at 12:15

## 2022-07-19 RX ADMIN — PROPOFOL 50 MG: 10 INJECTION, EMULSION INTRAVENOUS at 12:13

## 2022-07-19 RX ADMIN — CEFAZOLIN SODIUM 2 G: 2 SOLUTION INTRAVENOUS at 12:00

## 2022-07-19 RX ADMIN — FENTANYL CITRATE 25 MCG: 50 INJECTION, SOLUTION INTRAMUSCULAR; INTRAVENOUS at 13:12

## 2022-07-19 RX ADMIN — PROPOFOL 20 MG: 10 INJECTION, EMULSION INTRAVENOUS at 11:59

## 2022-07-19 RX ADMIN — PROPOFOL 10 MG: 10 INJECTION, EMULSION INTRAVENOUS at 12:02

## 2022-07-19 RX ADMIN — PROPOFOL 20 MG: 10 INJECTION, EMULSION INTRAVENOUS at 11:56

## 2022-07-19 RX ADMIN — ONDANSETRON 4 MG: 2 INJECTION INTRAMUSCULAR; INTRAVENOUS at 11:11

## 2022-07-19 RX ADMIN — MIDAZOLAM HYDROCHLORIDE 1 MG: 1 INJECTION, SOLUTION INTRAMUSCULAR; INTRAVENOUS at 11:34

## 2022-07-19 RX ADMIN — DEXMEDETOMIDINE 10 MCG: 100 INJECTION, SOLUTION, CONCENTRATE INTRAVENOUS at 11:59

## 2022-07-19 RX ADMIN — PROPOFOL 50 MG: 10 INJECTION, EMULSION INTRAVENOUS at 12:09

## 2022-07-19 RX ADMIN — FENTANYL CITRATE 25 MCG: 50 INJECTION INTRAMUSCULAR; INTRAVENOUS at 12:01

## 2022-07-19 RX ADMIN — KETOROLAC TROMETHAMINE 30 MG: 30 INJECTION, SOLUTION INTRAMUSCULAR; INTRAVENOUS at 12:20

## 2022-07-19 RX ADMIN — SODIUM CHLORIDE, POTASSIUM CHLORIDE, SODIUM LACTATE AND CALCIUM CHLORIDE 9 ML/HR: 600; 310; 30; 20 INJECTION, SOLUTION INTRAVENOUS at 11:10

## 2022-07-19 RX ADMIN — LIDOCAINE HYDROCHLORIDE 50 MG: 20 INJECTION, SOLUTION INFILTRATION; PERINEURAL at 11:56

## 2022-07-19 RX ADMIN — ACETAMINOPHEN 500 MG: 500 TABLET ORAL at 11:10

## 2022-07-19 RX ADMIN — OXYCODONE HYDROCHLORIDE AND ACETAMINOPHEN 1 TABLET: 5; 325 TABLET ORAL at 13:30

## 2022-07-19 RX ADMIN — FENTANYL CITRATE 25 MCG: 50 INJECTION INTRAMUSCULAR; INTRAVENOUS at 12:08

## 2022-07-19 RX ADMIN — DEXMEDETOMIDINE 10 MCG: 100 INJECTION, SOLUTION, CONCENTRATE INTRAVENOUS at 11:56

## 2022-07-19 NOTE — ANESTHESIA POSTPROCEDURE EVALUATION
Patient: Marilee Kraus    Procedure Summary     Date: 07/19/22 Room / Location:  [facility] /  [facility]    Anesthesia Start: 1152 Anesthesia Stop: 1231    Procedure: CERVICAL CONIZATION (N/A Cervix) Diagnosis:       Other abnormal cytological finding of specimen from cervix      (Other abnormal cytological finding of specimen from cervix [R87.618])    Surgeons: Sarah Berger MD Provider: Liu Block CRNA    Anesthesia Type: MAC ASA Status: 2          Anesthesia Type: MAC    Vitals  Vitals Value Taken Time   /69 07/19/22 1345   Temp 97.4 °F (36.3 °C) 07/19/22 1230   Pulse 73 07/19/22 1345   Resp 15 07/19/22 1345   SpO2 97 % 07/19/22 1345           Post Anesthesia Care and Evaluation    Patient location during evaluation: PHASE II  Patient participation: complete - patient participated  Level of consciousness: awake and alert  Pain score: 0  Pain management: adequate    Airway patency: patent  Anesthetic complications: No anesthetic complications  PONV Status: none  Cardiovascular status: acceptable  Respiratory status: acceptable  Hydration status: acceptable

## 2022-07-19 NOTE — ANESTHESIA PREPROCEDURE EVALUATION
Anesthesia Evaluation     Patient summary reviewed and Nursing notes reviewed   no history of anesthetic complications:  NPO Solid Status: > 8 hours  NPO Liquid Status: > 8 hours           Airway   Mallampati: II  TM distance: >3 FB  Neck ROM: full  No difficulty expected  Dental - normal exam     Pulmonary - negative pulmonary ROS and normal exam    breath sounds clear to auscultation  (-) pneumonia  Cardiovascular - negative cardio ROS and normal exam  Exercise tolerance: good (4-7 METS)    Rhythm: regular  Rate: normal        Neuro/Psych- negative ROS  GI/Hepatic/Renal/Endo    (+)  GERD well controlled,      Musculoskeletal (-) negative ROS    Abdominal  - normal exam   Substance History - negative use     OB/GYN negative ob/gyn ROS         Other - negative ROS                       Anesthesia Plan    ASA 2     MAC     intravenous induction     Anesthetic plan, risks, benefits, and alternatives have been provided, discussed and informed consent has been obtained with: patient.  Use of blood products discussed with patient  Consented to blood products.       CODE STATUS:

## 2022-07-19 NOTE — OP NOTE
Subjective     Date of Service:  07/19/22  Time of Service:  12:26 EDT    Surgical Staff: Surgeon(s) and Role:     * Sarah Berger MD - Primary   Additional Staff: none   Pre-operative diagnosis(es): Pre-Op Diagnosis Codes:     * Other abnormal cytological finding of specimen from cervix [R87.618]     Post-operative diagnosis(es): Post-Op Diagnosis Codes:     * Other abnormal cytological finding of specimen from cervix [R87.618]   Procedure(s): Procedure(s):  CERVICAL CONIZATION     Antibiotics: cefazolin (Ancef) ordered on call to OR     Anesthesia: Type: Choice  ASA:  II     Objective      Operative findings: Normal appearing cervix  Normal parametrium   Specimens removed: ID Type Source Tests Collected by Time   A (Not marked as sent) : enco cervical currettings Tissue Endocervix TISSUE PATHOLOGY EXAM Sarah Berger MD 7/19/2022 1215   B (Not marked as sent) : cervical cone Tissue Cervix TISSUE PATHOLOGY EXAM Sraah Berger MD 7/19/2022 1216      Fluid Intake: 300mL   Output: Documented Output  Est. Blood Loss 15mL  Urine Output 150mL      I/O this shift:  In: 350 [I.V.:300; IV Piggyback:50]  Out: -      Blood products used: No   Drains: * No LDAs found *   Implant Information: Nothing was implanted during the procedure   Complications: None apparent   Intraoperative consult(s): none   Condition: stable   Disposition: to PACU and then admit to  home         Assessment & Plan     After informed consent was obtained, the patient was taken to the operating room where MAC anesthesia was administered without difficulty.  She was placed in yellowfin stirrups and prepped and draped in normal sterile fashion after normal bimanual exam.  A bivalve speculum was placed in the vagina and the anterior lip of the cervix was grasped with a single-tooth tenaculum.  The cervix was injected with dilute vasopressin.  The patient tolerated this well.  Stay sutures of 0 Vicryl were then placed at 3  and 9:00 and secured.  Lugol's was then placed on the cervix and the transformation zone  was seen.  An 11 blade scalpel was then used to create a cone shaped defect and this was then amputated at the base and taken off the table intact to 12 with silk.  Endocervical curettings were then collected.  These were also sent for permanent section.  The cone bed was then cauterized with the rollerball.  The cone bed was hemostatic.  Monsel's was then placed in the cone bed and the stay sutures were cut with long ties.  All instruments were removed from the vagina.  The patient tolerated the procedure well and all counts were correct for the procedure.  The patient was extruded without difficulty and taken to recovery room in good condition.  Postop instructions and restrictions were reviewed the patient and family detail and all of her questions were answered.  The patient is to undergo pelvic rest for the next 2 weeks and follow-up in my office in 2 weeks for postop check.    Sarah Berger MD

## 2022-07-19 NOTE — INTERVAL H&P NOTE
H&P reviewed. The patient was examined and there are no changes to the H&P.  /76 (BP Location: Left arm, Patient Position: Lying)   Pulse 80   Temp 98.2 °F (36.8 °C) (Oral)   Resp 16   Wt 72.6 kg (160 lb)   SpO2 100%   BMI 25.82 kg/m²   Procedure reviewed and all questions answered.   Sarah Berger MD

## 2022-07-20 ENCOUNTER — TELEPHONE (OUTPATIENT)
Dept: OBSTETRICS AND GYNECOLOGY | Facility: CLINIC | Age: 42
End: 2022-07-20

## 2022-07-20 NOTE — TELEPHONE ENCOUNTER
Caller: Marilee Waller   NEEDS     Relationship: Self    Best call back number: [phone]    What form or medical record are you requesting: OUT OF WORK NOTE FOR 7-20/7-22 DUE TO SURGERY AND ON MEDS NOT ABLE TO DRIVE. NEEDS IT SENT TO HER JOB AT [employer] AT [address] , [facility], [address]  PH#[phone]  I WAS UNABLE TO GET A FAX # FROM THEM, IM IN A PHONE TREE THAT DOESN'T GIVE ME ANY OPTIONS FOR THAT. PT DID NOT HAVE FAX #..   SHE ALSO WOULD PRIMARILY LIKE PAPERWORK SENT TO HER EMAIL AT [email or web address]  (I JUST ACTIVATED HER MYCHART TODAY) MAY NEED TO CALL HER WHEN YOU GET PAPERWORK COMPLETED. I MADE 2X ATTEMPTS TO OBTAIN A FAX # FROM HER JOB BUT THERE ARE NO PHONE OPTIONS.      Who is requesting this form or medical record from you: [employer], [place]    How would you like to receive the form or medical records (pick-up, mail, fax): EMAIL  If fax, what is the fax number:   If mail, what is the address:   If pick-up, provide patient with address and location details    Timeframe paperwork needed: SHE WILL BE GOING BACK TO WORK ON Monday 7-25    Additional notes:

## 2022-07-21 LAB
LAB AP CASE REPORT: NORMAL
PATH REPORT.FINAL DX SPEC: NORMAL
PATH REPORT.GROSS SPEC: NORMAL

## 2022-07-22 NOTE — PROGRESS NOTES
PIP= you will need a  for this call.  Colon biopsy shows high-grade dysplasia, no cancer.  We will discuss this further at her postop visit.

## 2022-08-01 ENCOUNTER — OFFICE VISIT (OUTPATIENT)
Dept: OBSTETRICS AND GYNECOLOGY | Facility: CLINIC | Age: 42
End: 2022-08-01

## 2022-08-01 VITALS
WEIGHT: 163 LBS | HEIGHT: 66 IN | SYSTOLIC BLOOD PRESSURE: 120 MMHG | BODY MASS INDEX: 26.2 KG/M2 | DIASTOLIC BLOOD PRESSURE: 76 MMHG

## 2022-08-01 DIAGNOSIS — D06.9 HIGH GRADE SQUAMOUS INTRAEPITHELIAL LESION (HGSIL), GRADE 3 CIN, ON BIOPSY OF CERVIX: ICD-10-CM

## 2022-08-01 DIAGNOSIS — Z09 POSTOPERATIVE FOLLOW-UP: Primary | ICD-10-CM

## 2022-08-01 DIAGNOSIS — B96.89 BV (BACTERIAL VAGINOSIS): ICD-10-CM

## 2022-08-01 DIAGNOSIS — N76.0 BV (BACTERIAL VAGINOSIS): ICD-10-CM

## 2022-08-01 LAB
BILIRUB BLD-MCNC: NEGATIVE MG/DL
CLARITY, POC: CLEAR
COLOR UR: YELLOW
GLUCOSE UR STRIP-MCNC: NEGATIVE MG/DL
KETONES UR QL: NEGATIVE
LEUKOCYTE EST, POC: NEGATIVE
NITRITE UR-MCNC: NEGATIVE MG/ML
PH UR: 5 [PH] (ref 5–8)
PROT UR STRIP-MCNC: NEGATIVE MG/DL
RBC # UR STRIP: ABNORMAL /UL
SP GR UR: 1.03 (ref 1–1.03)
UROBILINOGEN UR QL: NORMAL

## 2022-08-01 PROCEDURE — 81002 URINALYSIS NONAUTO W/O SCOPE: CPT | Performed by: OBSTETRICS & GYNECOLOGY

## 2022-08-01 PROCEDURE — 99213 OFFICE O/P EST LOW 20 MIN: CPT | Performed by: OBSTETRICS & GYNECOLOGY

## 2022-08-01 RX ORDER — METRONIDAZOLE 500 MG/1
500 TABLET ORAL 2 TIMES DAILY
Qty: 14 TABLET | Refills: 0 | Status: SHIPPED | OUTPATIENT
Start: 2022-08-01 | End: 2022-08-08

## 2022-08-01 NOTE — PROGRESS NOTES
"Surgical follow up visit     Marilee Kraus is a 42 y.o. female who presents to the clinic 2 weeks status post Cold Knife Cone for cervical dysplasia Eating a regular diet without difficulty. Bowel movements are normal. The patient is not having any pain..  Vaginal bleeding is none but she does have vaginal discharge. Path shows RUMA 3 with negative margins and a neg ECC. We discussed rates of recurrence about 10% with negative margins and we will repeat her pap smear in 4 months and if it is still abnormal, will consider definitive surgery in the form of TLH.     The following portions of the patient's history were reviewed and updated as appropriate: allergies, current medications, past family history, past medical history, past social history, past surgical history and problem list.    Review of Systems  Review of Systems   Constitutional: Positive for activity change.   Genitourinary: Positive for vaginal discharge.   All other systems reviewed and are negative.       Objective    /76   Ht 167.6 cm (66\")   Wt 73.9 kg (163 lb)   LMP 07/08/2022   BMI 26.31 kg/m²     Physical Exam  Vitals and nursing note reviewed. Exam conducted with a chaperone present.   Constitutional:       Appearance: Normal appearance. She is well-developed.   HENT:      Head: Normocephalic and atraumatic.   Eyes:      General: No scleral icterus.     Conjunctiva/sclera: Conjunctivae normal.   Neck:      Thyroid: No thyromegaly.   Abdominal:      General: There is no distension.      Palpations: Abdomen is soft. There is no mass.      Tenderness: There is no abdominal tenderness. There is no guarding or rebound.      Hernia: No hernia is present.   Genitourinary:     General: Normal vulva.      Vagina: Vaginal discharge and bleeding present.      Cervix: Normal.      Uterus: Normal.       Adnexa: Right adnexa normal and left adnexa normal.      Comments: Suture material removed  + BV  Musculoskeletal:      Cervical back: Neck " supple.   Skin:     General: Skin is warm and dry.   Neurological:      Mental Status: She is alert and oriented to person, place, and time.   Psychiatric:         Mood and Affect: Mood normal.         Behavior: Behavior normal.         Thought Content: Thought content normal.         Judgment: Judgment normal.         Assessment     1) Post op Cold Knife Cone- Doing well postoperatively. RUMA 3, neg margins and neg ECC. Repeat pap/HPV in 4 months, if abnormal, consider TLH  2) Operative findings again reviewed. Pathology report discussed.     3) BV- ERX Flagyl 500 mg BID x 7 days     Plan    1. Continue any current medications.  2. Wound care discussed.  3. Activity restrictions: none  4. Anticipated return to work: now.  5. Follow up: 4 month(s) for repeat pap.     Sarah Berger MD     08/01/2022     14:24 EDT

## 2022-08-03 ENCOUNTER — HOSPITAL ENCOUNTER (OUTPATIENT)
Dept: MAMMOGRAPHY | Facility: HOSPITAL | Age: 42
Discharge: HOME | End: 2022-08-03
Admitting: OBSTETRICS & GYNECOLOGY

## 2022-08-03 DIAGNOSIS — Z12.31 ENCOUNTER FOR SCREENING MAMMOGRAM FOR MALIGNANT NEOPLASM OF BREAST: ICD-10-CM

## 2022-08-03 PROCEDURE — 77067 SCR MAMMO BI INCL CAD: CPT

## 2022-08-03 PROCEDURE — 77063 BREAST TOMOSYNTHESIS BI: CPT

## 2022-09-12 ENCOUNTER — OFFICE VISIT (OUTPATIENT)
Dept: GASTROENTEROLOGY | Facility: CLINIC | Age: 42
End: 2022-09-12

## 2022-09-12 VITALS
HEIGHT: 66 IN | SYSTOLIC BLOOD PRESSURE: 100 MMHG | WEIGHT: 161.2 LBS | BODY MASS INDEX: 25.91 KG/M2 | DIASTOLIC BLOOD PRESSURE: 70 MMHG

## 2022-09-12 DIAGNOSIS — R79.89 LFTS ABNORMAL: Primary | ICD-10-CM

## 2022-09-12 DIAGNOSIS — B96.81 HELICOBACTER PYLORI GASTRITIS: ICD-10-CM

## 2022-09-12 DIAGNOSIS — K29.70 HELICOBACTER PYLORI GASTRITIS: ICD-10-CM

## 2022-09-12 DIAGNOSIS — K21.00 GASTROESOPHAGEAL REFLUX DISEASE WITH ESOPHAGITIS WITHOUT HEMORRHAGE: ICD-10-CM

## 2022-09-12 PROCEDURE — 99213 OFFICE O/P EST LOW 20 MIN: CPT | Performed by: INTERNAL MEDICINE

## 2022-09-12 RX ORDER — PANTOPRAZOLE SODIUM 40 MG/1
40 TABLET, DELAYED RELEASE ORAL
Qty: 180 TABLET | Refills: 3 | Status: SHIPPED | OUTPATIENT
Start: 2022-09-12

## 2022-09-12 NOTE — PROGRESS NOTES
PATIENT INFORMATION  Marilee Kraus       - 1980    CHIEF COMPLAINT  Chief Complaint   Patient presents with   • Elevated Hepatic Enzymes   • Heartburn   • Chronic idiopathic constipation       HISTORY OF PRESENT ILLNESS  Her for follow u and is on BID PPI  And dong well but also followed for LFTs so will repeat he Karen labs today (CMP)    Worse when she doesn't eat like when she skips lunch at work but better with AA          REVIEWED PERTINENT RESULTS/ LABS  Lab Results   Component Value Date    CASEREPORT  2022     Surgical Pathology Report                         Case: [id number]                                  Authorizing Provider:  Sarah Berger,  Collected:           2022 12:15 PM                                 MD                                                                           Ordering Location:     [facility]   Received:            2022 01:06 PM                                 OR                                                                           Pathologist:           Shruti Menjivar MD                                                          Specimens:   1) - Cervix, Endocervix, endo cervical currettings                                                  2) - Cervix, cervical cone                                                                 FINALDX  2022     1. Endocervix, Curettings:   A. Fragments of nondysplastic glandular endocervical mucosa and submucosa.    2. Cervix, Cone Excision: Squamoglandular mucosa and submucosa with    A. High grade squamous intraepithelial lesion (RUMA III); dysplasia does not extend to the visualized inked endo or                    ectocervical margins of excision.    [facility]/jse        Lab Results   Component Value Date    HGB 13.3 2022    MCV 94.4 2022     2022    ALT 34 (H) 2022    AST 53 (H) 2022    FERRITIN 229.00 (H) 2021    IRON 81 2022     [facility] 422 04/25/2022      No results found.    REVIEW OF SYSTEMS  Review of Systems   Constitutional: Negative for activity change, chills, fever and unexpected weight change.   HENT: Negative for congestion.    Eyes: Negative for visual disturbance.   Respiratory: Negative for shortness of breath.    Cardiovascular: Negative for chest pain and palpitations.   Gastrointestinal: Positive for abdominal distention. Negative for abdominal pain and blood in stool.   Endocrine: Negative for cold intolerance and heat intolerance.   Genitourinary: Negative for hematuria.   Musculoskeletal: Negative for gait problem.   Skin: Negative for color change.   Allergic/Immunologic: Negative for immunocompromised state.   Neurological: Negative for weakness and light-headedness.   Hematological: Negative for adenopathy.   Psychiatric/Behavioral: Negative for sleep disturbance. The patient is not nervous/anxious.          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]    • Pneumonia due to COVID-19 virus [U07.1, J12.82]    • Gastroesophageal reflux disease with esophagitis without hemorrhage [K21.00]    • Epigastric pain [R10.13]    • S/P LEEP [Z98.890]    • Dysplasia of cervix, high grade RUMA 2 [N87.1]    • Breast pain [N64.4]    • ASCUS with positive high risk HPV cervical [R87.610, R87.810]          PAST MEDICAL HISTORY  Past Medical History:   Diagnosis Date   • Abnormal Pap smear of cervix 04/2018    ASCUS + HPV =- no follow up   • Cervical dysplasia     RUMA 2 on bx, neg LEEP   • GERD (gastroesophageal reflux disease)    • S/P LEEP 09/29/2020         SURGICAL HISTORY  Past Surgical History:   Procedure Laterality Date   • CERVICAL BIOPSY  W/ LOOP ELECTRODE EXCISION     • CERVICAL CONIZATION N/A 7/19/2022    Procedure: CERVICAL  CONIZATION;  Surgeon: Sarah Berger MD;  Location: [facility] OR;  Service: Obstetrics/Gynecology;  Laterality: N/A;   •  SECTION      placenta previa   •  SECTION     •  SECTION     • ENDOSCOPY N/A 2021    Procedure: ESOPHAGOGASTRODUODENOSCOPY with biopsies;  Surgeon: Lee Tubbs MD;  Location: [facility] OR;  Service: Gastroenterology;  Laterality: N/A;  Reflux  Biopsies: gastric, distal esophagus   • LEEP N/A 2020    Procedure: LOOP ELECTROCAUTERY EXCISION PROCEDURE;  Surgeon: Sarah Berger MD;  Location: [facility] OR;  Service: Obstetrics/Gynecology;  Laterality: N/A;         FAMILY HISTORY  Family History   Problem Relation Age of Onset   • Breast cancer Neg Hx    • Ovarian cancer Neg Hx    • Uterine cancer Neg Hx    • Colon cancer Neg Hx    • Prostate cancer Neg Hx    • Deep vein thrombosis Neg Hx          SOCIAL HISTORY  Social History     Occupational History   • Occupation: unemployed   Tobacco Use   • Smoking status: Never Smoker   • Smokeless tobacco: Never Used   Vaping Use   • Vaping Use: Never used   Substance and Sexual Activity   • Alcohol use: Never   • Drug use: Never   • Sexual activity: Yes     Partners: Male     Birth control/protection: Condom         CURRENT MEDICATIONS    Current Outpatient Medications:   •  Diclofenac Sodium (VOLTAREN) 1 % gel gel, , Disp: , Rfl:   •  ibuprofen (ADVIL,MOTRIN) 600 MG tablet, Take 1 tablet by mouth Every 8 (Eight) Hours As Needed for Mild or Moderate Pain., Disp: 20 tablet, Rfl: 0  •  pantoprazole (PROTONIX) 40 MG EC tablet, Take 1 tablet by mouth 2 (Two) Times a Day Before Meals., Disp: 180 tablet, Rfl: 3  •  vitamin D (ERGOCALCIFEROL) 1.25 MG (00424 UT) capsule capsule, Take 50,000 Units by mouth 1 (One) Time Per Week., Disp: , Rfl:     ALLERGIES  Amoxapine and Amoxicillin    VITALS  Vitals:    22 1505   BP: 100/70   BP Location: Left arm   Patient Position: Sitting   Cuff Size:  "Large Adult   Weight: 73.1 kg (161 lb 3.2 oz)   Height: 167.6 cm (66\")       PHYSICAL EXAM  Debilities/Disabilities Identified: None  Emotional Behavior: Appropriate  Wt Readings from Last 3 Encounters:   09/12/22 73.1 kg (161 lb 3.2 oz)   08/01/22 73.9 kg (163 lb)   07/19/22 72.6 kg (160 lb)     Ht Readings from Last 1 Encounters:   09/12/22 167.6 cm (66\")     Body mass index is 26.02 kg/m².  Physical Exam  Constitutional:       Appearance: She is well-developed. She is not diaphoretic.   HENT:      Head: Normocephalic and atraumatic.   Eyes:      General: No scleral icterus.     Conjunctiva/sclera: Conjunctivae normal.      Pupils: Pupils are equal, round, and reactive to light.   Neck:      Thyroid: No thyromegaly.   Cardiovascular:      Rate and Rhythm: Normal rate and regular rhythm.      Heart sounds: Normal heart sounds. No murmur heard.    No gallop.   Pulmonary:      Effort: Pulmonary effort is normal.      Breath sounds: Normal breath sounds. No wheezing or rales.   Abdominal:      General: Bowel sounds are normal. There is no distension or abdominal bruit.      Palpations: Abdomen is soft. There is no shifting dullness, fluid wave or mass.      Tenderness: There is no abdominal tenderness. There is no guarding. Negative signs include Farmer's sign.      Hernia: There is no hernia in the ventral area.   Musculoskeletal:         General: Normal range of motion.      Cervical back: Normal range of motion and neck supple.   Lymphadenopathy:      Cervical: No cervical adenopathy.   Skin:     General: Skin is warm and dry.      Findings: No erythema or rash.   Neurological:      Mental Status: She is alert and oriented to person, place, and time.   Psychiatric:         Mood and Affect: Mood normal.         Behavior: Behavior normal.         CLINICAL DATA REVIEWED   reviewed previous lab results and integrated with today's visit, reviewed notes from other physicians and/or last GI encounter, reviewed previous " endoscopy results and available photos, reviewed surgical pathology results from previous biopsies    ASSESSMENT  Diagnoses and all orders for this visit:    LFTs abnormal  -     Comprehensive Metabolic Panel; Future    Helicobacter pylori gastritis    Gastroesophageal reflux disease with esophagitis without hemorrhage          PLAN  Call if she feels hr HP comes back she should call for re-test and treat    Return in about 6 months (around 3/12/2023).    I have discussed the above plan with the patient.  They verbalize understanding and are in agreement with the plan.  They have been advised to contact the office for any questions, concerns, or changes related to their health.

## 2023-01-05 ENCOUNTER — OFFICE VISIT (OUTPATIENT)
Dept: OBSTETRICS AND GYNECOLOGY | Facility: CLINIC | Age: 43
End: 2023-01-05
Payer: COMMERCIAL

## 2023-01-05 VITALS
BODY MASS INDEX: 26 KG/M2 | WEIGHT: 161.8 LBS | DIASTOLIC BLOOD PRESSURE: 74 MMHG | HEIGHT: 66 IN | SYSTOLIC BLOOD PRESSURE: 114 MMHG

## 2023-01-05 DIAGNOSIS — Z12.31 ENCOUNTER FOR SCREENING MAMMOGRAM FOR MALIGNANT NEOPLASM OF BREAST: ICD-10-CM

## 2023-01-05 DIAGNOSIS — Z01.419 PAP SMEAR, LOW-RISK: Primary | ICD-10-CM

## 2023-01-05 DIAGNOSIS — Z11.51 SPECIAL SCREENING EXAMINATION FOR HUMAN PAPILLOMAVIRUS (HPV): ICD-10-CM

## 2023-01-05 DIAGNOSIS — Z01.419 ROUTINE GYNECOLOGICAL EXAMINATION: ICD-10-CM

## 2023-01-05 DIAGNOSIS — Z30.011 ORAL CONTRACEPTION INITIAL PRESCRIPTION: ICD-10-CM

## 2023-01-05 DIAGNOSIS — D06.9 HIGH GRADE SQUAMOUS INTRAEPITHELIAL LESION (HGSIL), GRADE 3 CIN, ON BIOPSY OF CERVIX: ICD-10-CM

## 2023-01-05 LAB
B-HCG UR QL: NEGATIVE
BILIRUB BLD-MCNC: NEGATIVE MG/DL
CLARITY, POC: CLEAR
COLOR UR: YELLOW
EXPIRATION DATE: NORMAL
GLUCOSE UR STRIP-MCNC: NEGATIVE MG/DL
INTERNAL NEGATIVE CONTROL: NORMAL
INTERNAL POSITIVE CONTROL: NORMAL
KETONES UR QL: NEGATIVE
LEUKOCYTE EST, POC: NEGATIVE
Lab: NORMAL
NITRITE UR-MCNC: NEGATIVE MG/ML
PH UR: 5 [PH] (ref 5–8)
PROT UR STRIP-MCNC: NEGATIVE MG/DL
RBC # UR STRIP: NEGATIVE /UL
SP GR UR: 1 (ref 1–1.03)
UROBILINOGEN UR QL: NORMAL

## 2023-01-05 PROCEDURE — 81025 URINE PREGNANCY TEST: CPT | Performed by: OBSTETRICS & GYNECOLOGY

## 2023-01-05 PROCEDURE — 81002 URINALYSIS NONAUTO W/O SCOPE: CPT | Performed by: OBSTETRICS & GYNECOLOGY

## 2023-01-05 PROCEDURE — 99396 PREV VISIT EST AGE 40-64: CPT | Performed by: OBSTETRICS & GYNECOLOGY

## 2023-01-05 RX ORDER — NORETHINDRONE ACETATE AND ETHINYL ESTRADIOL AND FERROUS FUMARATE 1MG-20(24)
1 KIT ORAL DAILY
Qty: 84 TABLET | Refills: 3 | Status: SHIPPED | OUTPATIENT
Start: 2023-01-05

## 2023-01-05 NOTE — PROGRESS NOTES
GYN Exam    CC- Here for annual     Marilee Kraus is a 42 y.o. female est pt who presents for annual and 6 month repeat pap smear. She had a CKC in 2022 and had RUMA 3 with neg margins and a neg ECC. This is her first repeat pap and if it is abnormal she is interested in TLH. She would also like to start on OCPS. She has [employer] insurance, which [facility] is no longer in network with, so I have given her paperwork to petition a continuation of care as well as names of [facility] providers. She wants Gardasil vaccine but needs to make sure she is in network first due to cost. She is willing to pay for today's visit if needed. Her cycles are light and regular and overall she feels well.     OB History        2    Para   2    Term   2       0    AB   0    Living   2       SAB   0    IAB   0    Ectopic   0    Molar   0    Multiple        Live Births   2          Obstetric Comments   No plans             Menarche: 15  Current contraception: none  History of abnormal Pap smear: yes- LEEP 2020- neg path, 2022- CKC with RUMA 3, neg margins and ECC  History of abnormal mammogram: no  Family history of uterine, colon or ovarian cancer: no  Family history of breast cancer: no  H/o STDs: none  Last pap:2022- LGSIL + HPV  Gardasil:missed  JIGNA: none    Health Maintenance   Topic Date Due   • COVID-19 Vaccine (1) Never done   • TDAP/TD VACCINES (1 - Tdap) Never done   • ANNUAL PHYSICAL  Never done   • INFLUENZA VACCINE  Never done   • Annual Gynecologic Pelvic and Breast Exam  10/26/2022   • PAP SMEAR  2025   • HEPATITIS C SCREENING  Completed   • Pneumococcal Vaccine 0-64  Aged Out       Past Medical History:   Diagnosis Date   • Abnormal Pap smear of cervix 2018    ASCUS + HPV =- no follow up   • Cervical dysplasia     RUMA 2 on bx, neg LEEP, RUMA 3 on CKC, neg margins   • GERD (gastroesophageal reflux disease)    • S/P LEEP 2020       Past Surgical History:   Procedure Laterality Date   •  CERVICAL BIOPSY  W/ LOOP ELECTRODE EXCISION     • CERVICAL CONIZATION N/A 2022    Procedure: CERVICAL CONIZATION;  Surgeon: Sarah Berger MD;  Location: [facility] OR;  Service: Obstetrics/Gynecology;  Laterality: N/A;   •  SECTION      placenta previa   •  SECTION     •  SECTION     • ENDOSCOPY N/A 2021    Procedure: ESOPHAGOGASTRODUODENOSCOPY with biopsies;  Surgeon: Lee Tubbs MD;  Location: [facility] OR;  Service: Gastroenterology;  Laterality: N/A;  Reflux  Biopsies: gastric, distal esophagus   • LEEP N/A 2020    Procedure: LOOP ELECTROCAUTERY EXCISION PROCEDURE;  Surgeon: Sarah Berger MD;  Location: [facility] OR;  Service: Obstetrics/Gynecology;  Laterality: N/A;         Current Outpatient Medications:   •  ibuprofen (ADVIL,MOTRIN) 600 MG tablet, Take 1 tablet by mouth Every 8 (Eight) Hours As Needed for Mild or Moderate Pain., Disp: 20 tablet, Rfl: 0  •  pantoprazole (PROTONIX) 40 MG EC tablet, Take 1 tablet by mouth 2 (Two) Times a Day Before Meals., Disp: 180 tablet, Rfl: 3  •  Diclofenac Sodium (VOLTAREN) 1 % gel gel, , Disp: , Rfl:   •  norethindrone-ethinyl estradiol-ferrous fumarate (LOESTIN 24 FE) 1-20 MG-MCG(24) per tablet, Take 1 tablet by mouth Daily., Disp: 84 tablet, Rfl: 3  •  vitamin D (ERGOCALCIFEROL) 1.25 MG (59099 UT) capsule capsule, Take 50,000 Units by mouth 1 (One) Time Per Week., Disp: , Rfl:     Allergies   Allergen Reactions   • Amoxapine Itching   • Amoxicillin Itching       Social History     Tobacco Use   • Smoking status: Never   • Smokeless tobacco: Never   Vaping Use   • Vaping Use: Never used   Substance Use Topics   • Alcohol use: Never   • Drug use: Never       Family History   Problem Relation Age of Onset   • Breast cancer Neg Hx    • Ovarian cancer Neg Hx    • Uterine cancer Neg Hx    • Colon cancer Neg Hx    • Prostate cancer Neg Hx    • Deep vein thrombosis Neg Hx        Review of Systems    Constitutional: Negative for activity change, appetite change, fatigue, fever and unexpected weight change.   HENT:        + sour taste in mouth   Eyes: Negative for photophobia and visual disturbance.   Respiratory: Negative for cough and shortness of breath.    Cardiovascular: Negative for chest pain and palpitations.   Gastrointestinal: Negative for abdominal distention, abdominal pain, constipation, diarrhea and nausea.   Endocrine: Negative for cold intolerance and heat intolerance.   Genitourinary: Positive for vaginal bleeding (spotting today). Negative for dyspareunia, dysuria, menstrual problem, pelvic pain and vaginal discharge.   Musculoskeletal: Negative for back pain.   Skin: Negative for color change and rash.   Allergic/Immunologic: Negative for environmental allergies and food allergies.   Neurological: Negative for headaches.   Hematological: Negative for adenopathy. Does not bruise/bleed easily.   Psychiatric/Behavioral: Negative for dysphoric mood. The patient is not nervous/anxious.    All other systems reviewed and are negative.      /74   Ht 167.6 cm (66\")   Wt 73.4 kg (161 lb 12.8 oz)   LMP 01/01/2023 (Exact Date)   BMI 26.12 kg/m²     Physical Exam   Constitutional: She is oriented to person, place, and time. She appears well-developed.   HENT:   Head: Normocephalic and atraumatic.   Eyes: Conjunctivae are normal. No scleral icterus.   Neck: No thyromegaly present.   Cardiovascular: Normal rate and regular rhythm.   Pulmonary/Chest: Effort normal and breath sounds normal. Right breast exhibits no inverted nipple, no mass, no nipple discharge, no skin change and no tenderness. Left breast exhibits no inverted nipple, no mass, no nipple discharge, no skin change and no tenderness.   Breasts are symmetrical, large size  No palpable masses, no skin changes, no LAD     Abdominal: Soft. Normal appearance. She exhibits no distension and no mass. There is no abdominal tenderness. There is  no rebound and no guarding. No hernia.   Genitourinary:    Right adnexa and left adnexa normal.   There is no rash, tenderness, lesion or injury on the right labia. There is no rash, tenderness, lesion or injury on the left labia. Uterus is not deviated, not enlarged, not fixed and not tender. Cervix exhibits no motion tenderness, no lesion, no discharge and no friability.    Vaginal bleeding present.      No vaginal discharge, erythema or tenderness.   There is bleeding in the vagina. No erythema or tenderness in the vagina.    No foreign body in the vagina.      No signs of injury or lesions in the vagina.     Neurological: She is alert and oriented to person, place, and time.   Skin: Skin is warm and dry.   Psychiatric: Her behavior is normal. Mood, judgment and thought content normal.   Nursing note and vitals reviewed.            Assessment/Plan      1) GYN HM: s/p CKC 8/2022. First 6 month pap/HPV today.   SBE demonstrated and encouraged.  2) STD screening: declines. . Condoms encouraged.  3) Contraception: condoms. Wants to start OCPS. .ERX Loestrin 24,Discussed with patient correct usage of oral contraceptive pills/patches/rings and what to do for a missed dose.  Patient reminded that condoms are the only form of contraceptive that can also prevent STDs and so use is encouraged with every act of coitus.  We reviewed ACHES warning signs (abdominal pain, chest pain, headache, eye vision changes or severe leg pain and or swelling).  Patient is encouraged to call for any questions or concerns.    4) Family Planning: family planning: considering kids, uncertain, encourage folic acid daily  5) Diet and Exercise discussed  6) Smoking Status: No  7) Social: no issues  8) MMG- UTD 8/2022, B1. Schedule MMG 8/2023  9)ENc pt to have Gardasil vaccine with in network provider or health department  10)I saw the patient with a face mask, gloves and eye protection  The patient herself was masked.  Social distancing was  observed as appropriate.  10)Parts of this document have been copied or forwarded from her previous visits and have been reviewed, updated and edited as indicated.   11)Follow up 6 month repeat pap and 1 year annual      Diagnoses and all orders for this visit:    1. Pap smear, low-risk (Primary)  -     POC Urinalysis Dipstick  -     POC Pregnancy, Urine  -     IGP, Apt HPV,rfx 16 / 18,45    2. Routine gynecological examination  -     POC Urinalysis Dipstick  -     POC Pregnancy, Urine  -     IGP, Apt HPV,rfx 16 / 18,45    3. Special screening examination for human papillomavirus (HPV)  -     POC Urinalysis Dipstick  -     POC Pregnancy, Urine  -     IGP, Apt HPV,rfx 16 / 18,45    4. Encounter for screening mammogram for malignant neoplasm of breast  -     Mammo Screening Digital Tomosynthesis Bilateral With CAD; Future    5. Oral contraception initial prescription    6. High grade squamous intraepithelial lesion (HGSIL), grade 3 RUMA, on biopsy of cervix    Other orders  -     norethindrone-ethinyl estradiol-ferrous fumarate (LOESTIN 24 FE) 1-20 MG-MCG(24) per tablet; Take 1 tablet by mouth Daily.  Dispense: 84 tablet; Refill: 3          Sarah Berger MD  1/5/2023  12:33 EST

## 2023-01-10 LAB
CYTOLOGIST CVX/VAG CYTO: ABNORMAL
CYTOLOGY CVX/VAG DOC CYTO: ABNORMAL
CYTOLOGY CVX/VAG DOC THIN PREP: ABNORMAL
DX ICD CODE: ABNORMAL
DX ICD CODE: ABNORMAL
HIV 1 & 2 AB SER-IMP: ABNORMAL
HPV I/H RISK 4 DNA CVX QL PROBE+SIG AMP: NEGATIVE
OTHER STN SPEC: ABNORMAL
PATHOLOGIST CVX/VAG CYTO: ABNORMAL
RECOM F/U CVX/VAG CYTO: ABNORMAL
STAT OF ADQ CVX/VAG CYTO-IMP: ABNORMAL

## 2023-02-02 ENCOUNTER — OFFICE VISIT (OUTPATIENT)
Dept: OBSTETRICS AND GYNECOLOGY | Facility: CLINIC | Age: 43
End: 2023-02-02
Payer: COMMERCIAL

## 2023-02-02 VITALS
WEIGHT: 167.2 LBS | DIASTOLIC BLOOD PRESSURE: 72 MMHG | SYSTOLIC BLOOD PRESSURE: 128 MMHG | HEIGHT: 66 IN | BODY MASS INDEX: 26.87 KG/M2

## 2023-02-02 DIAGNOSIS — D06.9 HIGH GRADE SQUAMOUS INTRAEPITHELIAL LESION (HGSIL), GRADE 3 CIN, ON BIOPSY OF CERVIX: ICD-10-CM

## 2023-02-02 DIAGNOSIS — Z87.42 HISTORY OF ABNORMAL CERVICAL PAP SMEAR: Primary | ICD-10-CM

## 2023-02-02 DIAGNOSIS — Z13.89 SCREENING FOR GENITOURINARY CONDITION: ICD-10-CM

## 2023-02-02 LAB
B-HCG UR QL: NEGATIVE
EXPIRATION DATE: NORMAL
INTERNAL NEGATIVE CONTROL: NORMAL
INTERNAL POSITIVE CONTROL: NORMAL
Lab: NORMAL

## 2023-02-02 PROCEDURE — 81025 URINE PREGNANCY TEST: CPT | Performed by: OBSTETRICS & GYNECOLOGY

## 2023-02-02 PROCEDURE — 57454 BX/CURETT OF CERVIX W/SCOPE: CPT | Performed by: OBSTETRICS & GYNECOLOGY

## 2023-02-02 PROCEDURE — 99214 OFFICE O/P EST MOD 30 MIN: CPT | Performed by: OBSTETRICS & GYNECOLOGY

## 2023-02-02 NOTE — PROGRESS NOTES
"      Marilee Kraus is a 43 y.o. patient who presents for follow up of   Chief Complaint   Patient presents with   • Colposcopy       42 yo est pt here for colpo. She had a ASCUS + HPV pap and RUMA 2 on bx that resulted in a LEEP in 9/2020, which was negative for dysplasia. Her ECC was negative. She then continued to have abnormal pap smears and in 8/2022 had a CKC with RUMA 3, neg margins and neg ECC. Her first 6 month repeat pap smear in 1/2023 was LGSIL and neg HPV. We had previously discussed that if this pap was abnormal that she would like to proceed with TLH, since she has had abnormal pap smears off and on since 2018. She wants to wait until her  is back in town in March to have help postop. She is also interested in having him at her preop exam and so we will get that scheduled. She stopped the pills because her  [place] the country.       The following portions of the patient's history were reviewed and updated as appropriate: allergies, current medications and problem list.    Review of Systems   Constitutional: Positive for activity change.   Genitourinary: Negative for pelvic pain, vaginal bleeding, vaginal discharge and vaginal pain.   Musculoskeletal: Negative for back pain.       /72   Ht 167.6 cm (66\")   Wt 75.8 kg (167 lb 3.2 oz)   BMI 26.99 kg/m²     Physical Exam  Vitals and nursing note reviewed. Exam conducted with a chaperone present.   Constitutional:       Appearance: She is well-developed.   HENT:      Head: Normocephalic and atraumatic.   Eyes:      General: No scleral icterus.     Conjunctiva/sclera: Conjunctivae normal.   Neck:      Thyroid: No thyromegaly.   Abdominal:      General: There is no distension.      Palpations: Abdomen is soft. There is no mass.      Tenderness: There is no abdominal tenderness. There is no guarding or rebound.      Hernia: No hernia is present.   Genitourinary:     General: Normal vulva.      Vagina: Normal.      Cervix: " Friability present.            Comments: 50 % TZ seen  Skin:     General: Skin is warm and dry.   Neurological:      Mental Status: She is alert and oriented to person, place, and time.   Psychiatric:         Mood and Affect: Mood normal.         Behavior: Behavior normal.         Thought Content: Thought content normal.         Judgment: Judgment normal.         Colposcopy Procedure Note    Procedures          Indications: Most recent Pap smear showed: low-grade squamous intraepithelial neoplasia (LGSIL - encompassing HPV,mild dysplasia,RUMA I), HPV negative    Prior cervical/vaginal disease: RUMA 3 on CKC  Prior cervical treatment: LLETZ and cone.  Contraception: abstinence    Procedure Details   The risks and benefits of the procedure and Verbal informed consent obtained.  Pregnancy test: negative    Speculum placed in vagina and excellent visualization of cervix achieved, cervix swabbed x 3 with acetic acid solution and Lugol's solution     Findings:  Cervix: no punctation, no abnormal vasculature and visible lesion(s) at 6 o'clock; cervix swabbed with Lugol's solution, endocervical curettage performed, cervical biopsies taken at 6 o'clock, specimen labelled and sent to pathology and hemostasis achieved with Monsel's solution.  Vaginal inspection: normal without visible lesions.  Vulvar colposcopy: vulvar colposcopy not performed.  Colpo adequacy: was not adquate    Specimens: 6, ECC    Colpo impression:  RUMA 1-2    Complications: none.   Patient tolerated procedure well.     Smoking Status: No      A/Plan:  1. Persistently abnormal pap smears after LEEP and CKC-     Specimens labelled and sent to Pathology.Will base further treatment on Pathology findings.Treatment options discussed with patient. We discussed options including serial pap/ECC, rpeeat CKC and TLH. She is interested in a TLH and info on this was given to her in [place].  We discussed the surgery at length, including the time it takes to recover.    Return to discuss Pathology results in 3/2023 per her request.    2.  I spent 30  minutes on the separately reported service of therapy options for persistent dysplasia. This time is not included in the time used to support the  E/M service also reported today.                Assessment & Plan   Diagnoses and all orders for this visit:    1. History of abnormal cervical Pap smear (Primary)  -     Reference Histopathology    2. Screening for genitourinary condition  -     POC Pregnancy, Urine    3. High grade squamous intraepithelial lesion (HGSIL), grade 3 RUMA, on biopsy of cervix  -     Reference Histopathology                 No follow-ups on file.      Sarah Berger MD    2/2/2023  12:41 EST

## 2023-02-06 LAB
DX ICD CODE: NORMAL
PATH REPORT.FINAL DX SPEC: NORMAL
PATH REPORT.GROSS SPEC: NORMAL
PATH REPORT.SITE OF ORIGIN SPEC: NORMAL
PATHOLOGIST NAME: NORMAL
PAYMENT PROCEDURE: NORMAL

## 2023-02-20 ENCOUNTER — TELEPHONE (OUTPATIENT)
Dept: OBSTETRICS AND GYNECOLOGY | Facility: CLINIC | Age: 43
End: 2023-02-20
Payer: COMMERCIAL

## 2023-02-20 NOTE — TELEPHONE ENCOUNTER
I saw where Dr. Berger noted for Marilee to keep her appointment in March.   Marilee is aware to keep the appt. Through an .

## 2023-02-20 NOTE — TELEPHONE ENCOUNTER
Marilee called and wanted to know if she still needs her surgery?  She said she thought a lab came back normal .

## 2023-03-13 ENCOUNTER — OFFICE VISIT (OUTPATIENT)
Dept: GASTROENTEROLOGY | Facility: CLINIC | Age: 43
End: 2023-03-13
Payer: COMMERCIAL

## 2023-03-13 ENCOUNTER — LAB (OUTPATIENT)
Dept: LAB | Facility: HOSPITAL | Age: 43
End: 2023-03-13
Payer: COMMERCIAL

## 2023-03-13 VITALS
HEIGHT: 66 IN | WEIGHT: 165.4 LBS | DIASTOLIC BLOOD PRESSURE: 72 MMHG | BODY MASS INDEX: 26.58 KG/M2 | SYSTOLIC BLOOD PRESSURE: 116 MMHG

## 2023-03-13 DIAGNOSIS — K21.00 GASTROESOPHAGEAL REFLUX DISEASE WITH ESOPHAGITIS WITHOUT HEMORRHAGE: ICD-10-CM

## 2023-03-13 DIAGNOSIS — K59.04 CHRONIC IDIOPATHIC CONSTIPATION: ICD-10-CM

## 2023-03-13 DIAGNOSIS — R79.89 ELEVATED LFTS: Primary | ICD-10-CM

## 2023-03-13 LAB
ALBUMIN SERPL-MCNC: 4.5 G/DL (ref 3.5–5.2)
ALBUMIN/GLOB SERPL: 1.4 G/DL
ALP SERPL-CCNC: 84 U/L (ref 39–117)
ALT SERPL W P-5'-P-CCNC: 17 U/L (ref 1–33)
ANION GAP SERPL CALCULATED.3IONS-SCNC: 8 MMOL/L (ref 5–15)
AST SERPL-CCNC: 20 U/L (ref 1–32)
BILIRUB SERPL-MCNC: 0.3 MG/DL (ref 0–1.2)
BUN SERPL-MCNC: 9 MG/DL (ref 6–20)
BUN/CREAT SERPL: 17.3 (ref 7–25)
CALCIUM SPEC-SCNC: 8.9 MG/DL (ref 8.6–10.5)
CHLORIDE SERPL-SCNC: 105 MMOL/L (ref 98–107)
CO2 SERPL-SCNC: 27 MMOL/L (ref 22–29)
CREAT SERPL-MCNC: 0.52 MG/DL (ref 0.57–1)
EGFRCR SERPLBLD CKD-EPI 2021: 119.1 ML/MIN/1.73
GLOBULIN UR ELPH-MCNC: 3.2 GM/DL
GLUCOSE SERPL-MCNC: 99 MG/DL (ref 65–99)
POTASSIUM SERPL-SCNC: 4.1 MMOL/L (ref 3.5–5.2)
PROT SERPL-MCNC: 7.7 G/DL (ref 6–8.5)
SODIUM SERPL-SCNC: 140 MMOL/L (ref 136–145)

## 2023-03-13 PROCEDURE — 99214 OFFICE O/P EST MOD 30 MIN: CPT

## 2023-03-13 PROCEDURE — 80053 COMPREHEN METABOLIC PANEL: CPT

## 2023-03-13 PROCEDURE — 36415 COLL VENOUS BLD VENIPUNCTURE: CPT

## 2023-03-13 NOTE — PROGRESS NOTES
PATIENT INFORMATION  Marilee Kraus       - 1980    CHIEF COMPLAINT  Chief Complaint   Patient presents with   • Follow-up     GERD; CIC; Hx Hpylori       HISTORY OF PRESENT ILLNESS  Here for GERD follow-up    Professional  used for visit.    Reports she feeling much better than last visit and is now on once a day PPI for the past 2 weeks, no breakthrough symptoms. Reviewed that BID PPI has been required and concern that symptoms will return on the lower dose. No nausea, abdominal pains. NSAIDs PRN, 1-2 times a month maybe.    Previously treated for HP, negative stool 2022, so PPI increased to BID at that time for sx.    At LOV was supposed to have labs rechecked since AST>ALT previously elevated, labs not collected.     Some difficulty initiating BM, is once a day, not solid, but incomplete emptying. No bleeding or pain. Reviewed water, fiber, exercise.     REVIEWED PERTINENT RESULTS/ LABS  Lab Results   Component Value Date    CASEREPORT  2022     Surgical Pathology Report                         Case: [id number]                                  Authorizing Provider:  Sarah Berger,  Collected:           2022 12:15 PM                                 MD                                                                           Ordering Location:     [facility]   Received:            2022 01:06 PM                                 OR                                                                           Pathologist:           Shruti Menjivar MD                                                          Specimens:   1) - Cervix, Endocervix, endo cervical currettings                                                  2) - Cervix, cervical cone                                                                 FINALDX  2022     1. Endocervix, Curettings:   A. Fragments of nondysplastic glandular endocervical mucosa and submucosa.    2. Cervix,  Cone Excision: Squamoglandular mucosa and submucosa with    A. High grade squamous intraepithelial lesion (RUMA III); dysplasia does not extend to the visualized inked endo or                    ectocervical margins of excision.    [facility]/e        Lab Results   Component Value Date    HGB 13.3 06/22/2022    MCV 94.4 06/22/2022     06/22/2022    ALT 34 (H) 06/22/2022    AST 53 (H) 06/22/2022    FERRITIN 229.00 (H) 12/11/2021    IRON 81 04/25/2022    TIBC 422 04/25/2022      No results found.    REVIEW OF SYSTEMS  Review of Systems   Constitutional: Negative.    HENT:        Recent FLU in Jan 2023   Eyes: Negative.    Respiratory: Negative.    Cardiovascular: Negative.    Gastrointestinal: Negative.    Endocrine: Negative.    Genitourinary: Negative.    Musculoskeletal: Positive for arthralgias and joint swelling (right shoulder ).   Skin: Negative.    Allergic/Immunologic: Negative.    Neurological: Negative.    Hematological: Negative.    Psychiatric/Behavioral: Negative.          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]    • Pneumonia due to COVID-19 virus [U07.1, J12.82]    • Gastroesophageal reflux disease with esophagitis without hemorrhage [K21.00]    • Epigastric pain [R10.13]    • S/P LEEP [Z98.890]    • Dysplasia of cervix, high grade RUMA 2 [N87.1]    • Breast pain [N64.4]    • ASCUS with positive high risk HPV cervical [R87.610, R87.810]          PAST MEDICAL HISTORY  Past Medical History:   Diagnosis Date   • Abnormal Pap smear of cervix 04/2018    ASCUS + HPV =- no follow up   • Cervical dysplasia     RUMA 2 on bx, neg LEEP, RUMA 3 on CKC, neg margins   • GERD (gastroesophageal reflux disease)    • S/P LEEP 09/29/2020         SURGICAL HISTORY  Past Surgical History:   Procedure Laterality Date   •  CERVICAL BIOPSY  W/ LOOP ELECTRODE EXCISION     • CERVICAL CONIZATION N/A 2022    Procedure: CERVICAL CONIZATION;  Surgeon: Sarah Berger MD;  Location: [facility] OR;  Service: Obstetrics/Gynecology;  Laterality: N/A;   •  SECTION      placenta previa   •  SECTION     •  SECTION     • ENDOSCOPY N/A 2021    Procedure: ESOPHAGOGASTRODUODENOSCOPY with biopsies;  Surgeon: Lee Tubbs MD;  Location: [facility] OR;  Service: Gastroenterology;  Laterality: N/A;  Reflux  Biopsies: gastric, distal esophagus   • LEEP N/A 2020    Procedure: LOOP ELECTROCAUTERY EXCISION PROCEDURE;  Surgeon: Sarah Berger MD;  Location: [facility] OR;  Service: Obstetrics/Gynecology;  Laterality: N/A;         FAMILY HISTORY  Family History   Problem Relation Age of Onset   • Breast cancer Neg Hx    • Ovarian cancer Neg Hx    • Uterine cancer Neg Hx    • Colon cancer Neg Hx    • Prostate cancer Neg Hx    • Deep vein thrombosis Neg Hx          SOCIAL HISTORY  Social History     Occupational History   • Occupation: unemployed   Tobacco Use   • Smoking status: Never   • Smokeless tobacco: Never   Vaping Use   • Vaping Use: Never used   Substance and Sexual Activity   • Alcohol use: Never   • Drug use: Never   • Sexual activity: Not Currently     Partners: Male     Birth control/protection: Condom         CURRENT MEDICATIONS    Current Outpatient Medications:   •  Diclofenac Sodium (VOLTAREN) 1 % gel gel, , Disp: , Rfl:   •  pantoprazole (PROTONIX) 40 MG EC tablet, Take 1 tablet by mouth 2 (Two) Times a Day Before Meals., Disp: 180 tablet, Rfl: 3  •  ibuprofen (ADVIL,MOTRIN) 600 MG tablet, Take 1 tablet by mouth Every 8 (Eight) Hours As Needed for Mild or Moderate Pain., Disp: 20 tablet, Rfl: 0  •  norethindrone-ethinyl estradiol-ferrous fumarate (LOESTIN 24 FE) 1-20 MG-MCG(24) per tablet, Take 1 tablet by mouth Daily., Disp: 84 tablet, Rfl: 3    ALLERGIES  Amoxapine and  "Amoxicillin    VITALS  Vitals:    03/13/23 1306   BP: 116/72   BP Location: Left arm   Patient Position: Sitting   Cuff Size: Adult   Weight: 75 kg (165 lb 6.4 oz)   Height: 167.6 cm (65.98\")       PHYSICAL EXAM  Debilities/Disabilities Identified: None  Emotional Behavior: Appropriate  Wt Readings from Last 3 Encounters:   03/13/23 75 kg (165 lb 6.4 oz)   02/02/23 75.8 kg (167 lb 3.2 oz)   01/05/23 73.4 kg (161 lb 12.8 oz)     Ht Readings from Last 1 Encounters:   03/13/23 167.6 cm (65.98\")     Body mass index is 26.71 kg/m².  Physical Exam  Constitutional:       General: She is not in acute distress.     Appearance: Normal appearance. She is not ill-appearing.   HENT:      Head: Normocephalic and atraumatic.      Mouth/Throat:      Mouth: Mucous membranes are moist.      Pharynx: No posterior oropharyngeal erythema.   Eyes:      General: No scleral icterus.  Cardiovascular:      Rate and Rhythm: Normal rate and regular rhythm.      Heart sounds: Normal heart sounds.   Pulmonary:      Effort: Pulmonary effort is normal.      Breath sounds: Normal breath sounds.   Abdominal:      General: Abdomen is flat. Bowel sounds are normal. There is no distension.      Palpations: Abdomen is soft. There is no mass.      Tenderness: There is no abdominal tenderness. There is no guarding or rebound. Negative signs include Farmer's sign.      Hernia: No hernia is present.   Musculoskeletal:      Cervical back: Neck supple.   Skin:     General: Skin is warm.      Capillary Refill: Capillary refill takes less than 2 seconds.   Neurological:      General: No focal deficit present.      Mental Status: She is alert and oriented to person, place, and time.   Psychiatric:         Mood and Affect: Mood normal.         Behavior: Behavior normal.         Thought Content: Thought content normal.         Judgment: Judgment normal.         CLINICAL DATA REVIEWED   reviewed previous lab results and integrated with today's visit, reviewed notes " from other physicians and/or last GI encounter, reviewed previous endoscopy results and available photos, reviewed surgical pathology results from previous biopsies    ASSESSMENT  Diagnoses and all orders for this visit:    Elevated LFTs  -     Comprehensive Metabolic Panel    Chronic idiopathic constipation    Gastroesophageal reflux disease with esophagitis without hemorrhage          PLAN    Resume BID PPI  Check CMP, investigate further if still elevated  Water, fiber, exercise    Return in about 4 months (around 7/13/2023).    I have discussed the above plan with the patient.  They verbalize understanding and are in agreement with the plan.  They have been advised to contact the office for any questions, concerns, or changes related to their health.

## 2023-03-23 ENCOUNTER — TELEPHONE (OUTPATIENT)
Dept: OBSTETRICS AND GYNECOLOGY | Facility: CLINIC | Age: 43
End: 2023-03-23
Payer: COMMERCIAL

## 2023-03-23 ENCOUNTER — OFFICE VISIT (OUTPATIENT)
Dept: OBSTETRICS AND GYNECOLOGY | Facility: CLINIC | Age: 43
End: 2023-03-23
Payer: COMMERCIAL

## 2023-03-23 VITALS
SYSTOLIC BLOOD PRESSURE: 110 MMHG | DIASTOLIC BLOOD PRESSURE: 70 MMHG | WEIGHT: 158 LBS | HEIGHT: 66 IN | BODY MASS INDEX: 25.39 KG/M2

## 2023-03-23 DIAGNOSIS — Z01.818 PREOPERATIVE EXAM FOR GYNECOLOGIC SURGERY: ICD-10-CM

## 2023-03-23 DIAGNOSIS — D06.9 HIGH GRADE SQUAMOUS INTRAEPITHELIAL LESION (HGSIL), GRADE 3 CIN, ON BIOPSY OF CERVIX: Primary | ICD-10-CM

## 2023-03-23 DIAGNOSIS — Z13.89 SCREENING FOR GENITOURINARY CONDITION: ICD-10-CM

## 2023-03-23 NOTE — PROGRESS NOTES
"      Marilee Kraus is a 43 y.o. patient who presents for follow up of   Chief Complaint   Patient presents with   • Pre-op Exam       42 yo est pt here for preop TLH and endo bx. She had a ASCUS + HPV pap and RUMA 2 on bx that resulted in a LEEP in 9/2020, which was negative for dysplasia. Her ECC was negative. She then continued to have abnormal pap smears and in 8/2022 had a CKC with RUMA 3, neg margins and neg ECC. Her first 6 month repeat pap smear in 1/2023 was LGSIL and neg HP and negative biopsies.. She would like to proceed with TLH, since she has had abnormal pap smears off and on since 2018. She desires to keep her ovaries if they appear normal at the time of her procedure. Postop restrictions were reviewed at length with the help from the  service.       The following portions of the patient's history were reviewed and updated as appropriate: allergies, current medications and problem list.    Review of Systems   Constitutional: Negative for appetite change, fatigue, fever and unexpected weight change.   Eyes: Negative for photophobia and visual disturbance.   Respiratory: Negative for cough and shortness of breath.    Cardiovascular: Negative for chest pain and palpitations.   Gastrointestinal: Negative for abdominal distention, abdominal pain, constipation, diarrhea and nausea.   Endocrine: Negative for cold intolerance and heat intolerance.   Genitourinary: Negative for dyspareunia, dysuria, menstrual problem, pelvic pain and vaginal discharge.   Musculoskeletal: Negative for back pain.   Skin: Negative for color change and rash.   Neurological: Negative for headaches.   Hematological: Negative for adenopathy. Does not bruise/bleed easily.   Psychiatric/Behavioral: Negative for dysphoric mood. The patient is not nervous/anxious.        /70   Ht 167.6 cm (66\")   Wt 71.7 kg (158 lb)   LMP 03/01/2023   Breastfeeding No   BMI 25.50 kg/m²     Physical Exam  Vitals and nursing note " reviewed. Exam conducted with a chaperone present.   Constitutional:       Appearance: Normal appearance. She is well-developed and normal weight.   HENT:      Head: Normocephalic and atraumatic.   Eyes:      General: No scleral icterus.     Conjunctiva/sclera: Conjunctivae normal.   Neck:      Thyroid: No thyromegaly.   Cardiovascular:      Rate and Rhythm: Normal rate and regular rhythm.   Pulmonary:      Effort: Pulmonary effort is normal.      Breath sounds: Normal breath sounds.   Abdominal:      General: There is no distension.      Palpations: Abdomen is soft. There is no mass.      Tenderness: There is no abdominal tenderness. There is no guarding or rebound.      Hernia: No hernia is present.   Genitourinary:     General: Normal vulva.      Vagina: Normal.      Cervix: Normal.      Uterus: Normal.       Adnexa: Right adnexa normal and left adnexa normal.   Skin:     General: Skin is warm and dry.   Neurological:      Mental Status: She is alert and oriented to person, place, and time.   Psychiatric:         Mood and Affect: Mood normal.         Behavior: Behavior normal.         Thought Content: Thought content normal.         Judgment: Judgment normal.       PROCEDURE NOTE    Procedures      Diagnosis  Pre-op       Patient's last menstrual period was 03/01/2023.         UCG: negative  Menopausal No   HRT  negative   Current contraception: abstinence    Applying Universal Precautions I properly identified the patient and sought her signature with informed consent.  The reason for the biopsy was discussed.  Using a betadine prep on the cervix the cervix was grasped with a tenaculum and the uterus sounded to 7 cm.  A disposable Pipelle was used to retrieve the specimen by passing it 5 times into the cavity hoping to sample more than one area.     Additional procedures necessary were not necessary  The specimen was labelled and placed in a formalin container.  Tissue was adequate  The patient tolerated the  procedure    Instructions were given on vaginal rest, OTC tylenol, Motrin or Aleve, and expected future bleeding.  Resulting and follow up were discussed.        A/P:  1. H/O RUMA 3 and abnormal paps since 2018- plan TLH/BS with IRINA, possible JOSÉ.Risks, benefits and alternatives of the procedure were discussed, including , but not limited to: infection, bleeding, transfusion, injury to adjacent structures, laparotomy, possible nondiagnostic findings, possible findings of unexpected malignancy, reoperation, recurrent symptoms, thromboembolic events, anaeasthetic complications and death. Pre/intra/postop course was reviewed and all questions answered. Patient was encouraged to call for any additional questions she might have in the future.         Assessment & Plan   Diagnoses and all orders for this visit:    1. High grade squamous intraepithelial lesion (HGSIL), grade 3 RUMA, on biopsy of cervix (Primary)  -     Reference Histopathology    2. Screening for genitourinary condition  -     POC Pregnancy, Urine    3. Preoperative exam for gynecologic surgery                 No follow-ups on file.      Sarah Berger MD    3/23/2023  22:25 EDT

## 2023-03-25 ENCOUNTER — PREP FOR SURGERY (OUTPATIENT)
Dept: OTHER | Facility: HOSPITAL | Age: 43
End: 2023-03-25
Payer: COMMERCIAL

## 2023-03-25 DIAGNOSIS — D06.9 HIGH GRADE SQUAMOUS INTRAEPITHELIAL LESION (HGSIL), GRADE 3 CIN, ON BIOPSY OF CERVIX: Primary | ICD-10-CM

## 2023-03-25 DIAGNOSIS — R87.619 ABNORMAL CERVICAL PAPANICOLAOU SMEAR, UNSPECIFIED ABNORMAL PAP FINDING: ICD-10-CM

## 2023-03-25 RX ORDER — CEFAZOLIN SODIUM 2 G/50ML
2 SOLUTION INTRAVENOUS ONCE
OUTPATIENT
Start: 2023-03-25 | End: 2023-03-25

## 2023-03-25 RX ORDER — SODIUM CHLORIDE 9 MG/ML
40 INJECTION, SOLUTION INTRAVENOUS AS NEEDED
OUTPATIENT
Start: 2023-03-25

## 2023-03-25 RX ORDER — SODIUM CHLORIDE 0.9 % (FLUSH) 0.9 %
10 SYRINGE (ML) INJECTION EVERY 12 HOURS SCHEDULED
OUTPATIENT
Start: 2023-03-25

## 2023-03-25 RX ORDER — SODIUM CHLORIDE 0.9 % (FLUSH) 0.9 %
10 SYRINGE (ML) INJECTION AS NEEDED
OUTPATIENT
Start: 2023-03-25

## 2023-03-27 PROBLEM — D06.9 HIGH GRADE SQUAMOUS INTRAEPITHELIAL LESION (HGSIL), GRADE 3 CIN, ON BIOPSY OF CERVIX: Status: ACTIVE | Noted: 2023-03-27

## 2023-05-09 ENCOUNTER — TELEPHONE (OUTPATIENT)
Dept: OBSTETRICS AND GYNECOLOGY | Facility: CLINIC | Age: 43
End: 2023-05-09
Payer: COMMERCIAL

## 2023-05-09 NOTE — TELEPHONE ENCOUNTER
Patient is sick and has cancelled surgery for 5/23/23, will call back to reschedule once she is feeling better.

## 2023-08-04 ENCOUNTER — HOSPITAL ENCOUNTER (OUTPATIENT)
Dept: MAMMOGRAPHY | Facility: HOSPITAL | Age: 43
Discharge: HOME | End: 2023-08-04
Admitting: OBSTETRICS & GYNECOLOGY
Payer: COMMERCIAL

## 2023-08-04 DIAGNOSIS — Z12.31 ENCOUNTER FOR SCREENING MAMMOGRAM FOR MALIGNANT NEOPLASM OF BREAST: ICD-10-CM

## 2023-08-04 PROCEDURE — 77063 BREAST TOMOSYNTHESIS BI: CPT

## 2023-08-04 PROCEDURE — 77067 SCR MAMMO BI INCL CAD: CPT

## 2023-08-08 ENCOUNTER — PRE-ADMISSION TESTING (OUTPATIENT)
Dept: PREADMISSION TESTING | Facility: HOSPITAL | Age: 43
End: 2023-08-08
Payer: COMMERCIAL

## 2023-08-08 VITALS
HEART RATE: 88 BPM | WEIGHT: 172.18 LBS | OXYGEN SATURATION: 98 % | HEIGHT: 64 IN | DIASTOLIC BLOOD PRESSURE: 61 MMHG | RESPIRATION RATE: 16 BRPM | SYSTOLIC BLOOD PRESSURE: 92 MMHG | BODY MASS INDEX: 29.4 KG/M2

## 2023-08-08 DIAGNOSIS — Z01.818 PREOPERATIVE EXAM FOR GYNECOLOGIC SURGERY: ICD-10-CM

## 2023-08-08 LAB
ABO GROUP BLD: NORMAL
ABO GROUP BLD: NORMAL
BLD GP AB SCN SERPL QL: NEGATIVE
DEPRECATED RDW RBC AUTO: 42.9 FL (ref 37–54)
ERYTHROCYTE [DISTWIDTH] IN BLOOD BY AUTOMATED COUNT: 12.3 % (ref 12.3–15.4)
HCT VFR BLD AUTO: 42.4 % (ref 34–46.6)
HGB BLD-MCNC: 13.5 G/DL (ref 12–15.9)
MCH RBC QN AUTO: 30.1 PG (ref 26.6–33)
MCHC RBC AUTO-ENTMCNC: 31.8 G/DL (ref 31.5–35.7)
MCV RBC AUTO: 94.4 FL (ref 79–97)
PLATELET # BLD AUTO: 336 10*3/MM3 (ref 140–450)
PMV BLD AUTO: 9.9 FL (ref 6–12)
RBC # BLD AUTO: 4.49 10*6/MM3 (ref 3.77–5.28)
RH BLD: POSITIVE
RH BLD: POSITIVE
T&S EXPIRATION DATE: NORMAL
WBC NRBC COR # BLD: 7.93 10*3/MM3 (ref 3.4–10.8)

## 2023-08-08 PROCEDURE — 85027 COMPLETE CBC AUTOMATED: CPT | Performed by: OBSTETRICS & GYNECOLOGY

## 2023-08-08 PROCEDURE — 86901 BLOOD TYPING SEROLOGIC RH(D): CPT

## 2023-08-08 PROCEDURE — 36415 COLL VENOUS BLD VENIPUNCTURE: CPT

## 2023-08-08 PROCEDURE — 86900 BLOOD TYPING SEROLOGIC ABO: CPT | Performed by: OBSTETRICS & GYNECOLOGY

## 2023-08-08 PROCEDURE — 86901 BLOOD TYPING SEROLOGIC RH(D): CPT | Performed by: OBSTETRICS & GYNECOLOGY

## 2023-08-08 PROCEDURE — 86850 RBC ANTIBODY SCREEN: CPT | Performed by: OBSTETRICS & GYNECOLOGY

## 2023-08-08 PROCEDURE — 86900 BLOOD TYPING SEROLOGIC ABO: CPT

## 2023-08-08 RX ORDER — ALBUTEROL SULFATE 90 UG/1
2 AEROSOL, METERED RESPIRATORY (INHALATION) EVERY 4 HOURS PRN
COMMUNITY

## 2023-08-08 NOTE — LETTER
August 8, 2023      [facility]  [address]  [place] [place] [place] [address]  [phone]          Patient: Marilee Kraus   YOB: 1980   Date of Visit: 8/8/2023       To Whom It May Concern:    Marilee Kraus was seen at [facility] on 8/8/2023.            Sincerely,        [facility] PAT 1

## 2023-08-08 NOTE — PAT
Pt here for PAT visit.  Pre-op tests completed, chg soap given, and instructions reviewed.  Instructed clears until 4:30 am dos, voiced understanding. ERAS handouts given and reviewed, voiced understanding.

## 2023-08-14 ENCOUNTER — ANESTHESIA EVENT (OUTPATIENT)
Dept: PERIOP | Facility: HOSPITAL | Age: 43
End: 2023-08-14
Payer: COMMERCIAL

## 2023-08-14 PROCEDURE — S0260 H&P FOR SURGERY: HCPCS | Performed by: OBSTETRICS & GYNECOLOGY

## 2023-08-15 ENCOUNTER — ANESTHESIA (OUTPATIENT)
Dept: PERIOP | Facility: HOSPITAL | Age: 43
End: 2023-08-15
Payer: COMMERCIAL

## 2023-08-15 ENCOUNTER — HOSPITAL ENCOUNTER (OUTPATIENT)
Facility: HOSPITAL | Age: 43
Discharge: HOME | End: 2023-08-16
Attending: OBSTETRICS & GYNECOLOGY | Admitting: OBSTETRICS & GYNECOLOGY
Payer: COMMERCIAL

## 2023-08-15 DIAGNOSIS — Z90.710 STATUS POST HYSTERECTOMY: Primary | ICD-10-CM

## 2023-08-15 DIAGNOSIS — D06.9 HIGH GRADE SQUAMOUS INTRAEPITHELIAL LESION (HGSIL), GRADE 3 CIN, ON BIOPSY OF CERVIX: ICD-10-CM

## 2023-08-15 DIAGNOSIS — R87.619 ABNORMAL CERVICAL PAPANICOLAOU SMEAR, UNSPECIFIED ABNORMAL PAP FINDING: ICD-10-CM

## 2023-08-15 LAB — HCG SERPL QL: NEGATIVE

## 2023-08-15 PROCEDURE — 25010000002 FENTANYL CITRATE (PF) 50 MCG/ML SOLUTION: Performed by: NURSE ANESTHETIST, CERTIFIED REGISTERED

## 2023-08-15 PROCEDURE — 25010000002 HYDROMORPHONE 1 MG/ML SOLUTION: Performed by: NURSE ANESTHETIST, CERTIFIED REGISTERED

## 2023-08-15 PROCEDURE — 94799 UNLISTED PULMONARY SVC/PX: CPT

## 2023-08-15 PROCEDURE — 58571 TLH W/T/O 250 G OR LESS: CPT | Performed by: SPECIALIST/TECHNOLOGIST, OTHER

## 2023-08-15 PROCEDURE — G0378 HOSPITAL OBSERVATION PER HR: HCPCS

## 2023-08-15 PROCEDURE — 84703 CHORIONIC GONADOTROPIN ASSAY: CPT | Performed by: OBSTETRICS & GYNECOLOGY

## 2023-08-15 PROCEDURE — 25010000002 SUGAMMADEX 200 MG/2ML SOLUTION: Performed by: NURSE ANESTHETIST, CERTIFIED REGISTERED

## 2023-08-15 PROCEDURE — 94761 N-INVAS EAR/PLS OXIMETRY MLT: CPT

## 2023-08-15 PROCEDURE — 58571 TLH W/T/O 250 G OR LESS: CPT | Performed by: OBSTETRICS & GYNECOLOGY

## 2023-08-15 PROCEDURE — 0 MORPHINE PER 10 MG: Performed by: NURSE ANESTHETIST, CERTIFIED REGISTERED

## 2023-08-15 PROCEDURE — 0 CEFAZOLIN SODIUM-DEXTROSE 2-3 GM-%(50ML) RECONSTITUTED SOLUTION: Performed by: OBSTETRICS & GYNECOLOGY

## 2023-08-15 PROCEDURE — 25010000002 DEXAMETHASONE PER 1 MG: Performed by: NURSE ANESTHETIST, CERTIFIED REGISTERED

## 2023-08-15 PROCEDURE — 25010000002 ONDANSETRON PER 1 MG: Performed by: NURSE ANESTHETIST, CERTIFIED REGISTERED

## 2023-08-15 PROCEDURE — 88302 TISSUE EXAM BY PATHOLOGIST: CPT | Performed by: OBSTETRICS & GYNECOLOGY

## 2023-08-15 PROCEDURE — 25010000002 BUPIVACAINE 0.5 % SOLUTION: Performed by: NURSE ANESTHETIST, CERTIFIED REGISTERED

## 2023-08-15 PROCEDURE — 25010000002 PROPOFOL 200 MG/20ML EMULSION: Performed by: NURSE ANESTHETIST, CERTIFIED REGISTERED

## 2023-08-15 RX ORDER — SODIUM CHLORIDE 0.9 % (FLUSH) 0.9 %
10 SYRINGE (ML) INJECTION AS NEEDED
Status: DISCONTINUED | OUTPATIENT
Start: 2023-08-15 | End: 2023-08-15 | Stop reason: HOSPADM

## 2023-08-15 RX ORDER — OXYCODONE AND ACETAMINOPHEN 10; 325 MG/1; MG/1
1 TABLET ORAL EVERY 4 HOURS PRN
Status: DISCONTINUED | OUTPATIENT
Start: 2023-08-15 | End: 2023-08-16 | Stop reason: HOSPADM

## 2023-08-15 RX ORDER — MAGNESIUM HYDROXIDE 1200 MG/15ML
LIQUID ORAL AS NEEDED
Status: DISCONTINUED | OUTPATIENT
Start: 2023-08-15 | End: 2023-08-15 | Stop reason: HOSPADM

## 2023-08-15 RX ORDER — CEFAZOLIN SODIUM 2 G/50ML
2 SOLUTION INTRAVENOUS ONCE
Status: COMPLETED | OUTPATIENT
Start: 2023-08-15 | End: 2023-08-15

## 2023-08-15 RX ORDER — ONDANSETRON 2 MG/ML
4 INJECTION INTRAMUSCULAR; INTRAVENOUS ONCE AS NEEDED
Status: DISCONTINUED | OUTPATIENT
Start: 2023-08-15 | End: 2023-08-15 | Stop reason: HOSPADM

## 2023-08-15 RX ORDER — DIPHENHYDRAMINE HYDROCHLORIDE 50 MG/ML
25 INJECTION INTRAMUSCULAR; INTRAVENOUS NIGHTLY PRN
Status: DISCONTINUED | OUTPATIENT
Start: 2023-08-15 | End: 2023-08-16 | Stop reason: HOSPADM

## 2023-08-15 RX ORDER — MEPERIDINE HYDROCHLORIDE 25 MG/ML
12.5 INJECTION INTRAMUSCULAR; INTRAVENOUS; SUBCUTANEOUS
Status: DISCONTINUED | OUTPATIENT
Start: 2023-08-15 | End: 2023-08-15 | Stop reason: HOSPADM

## 2023-08-15 RX ORDER — FENTANYL CITRATE 50 UG/ML
50 INJECTION, SOLUTION INTRAMUSCULAR; INTRAVENOUS
Status: DISCONTINUED | OUTPATIENT
Start: 2023-08-15 | End: 2023-08-15 | Stop reason: HOSPADM

## 2023-08-15 RX ORDER — PANTOPRAZOLE SODIUM 20 MG/1
40 TABLET, DELAYED RELEASE ORAL 2 TIMES DAILY
Status: DISCONTINUED | OUTPATIENT
Start: 2023-08-15 | End: 2023-08-15 | Stop reason: HOSPADM

## 2023-08-15 RX ORDER — SODIUM CHLORIDE, SODIUM LACTATE, POTASSIUM CHLORIDE, CALCIUM CHLORIDE 600; 310; 30; 20 MG/100ML; MG/100ML; MG/100ML; MG/100ML
125 INJECTION, SOLUTION INTRAVENOUS CONTINUOUS
Status: DISCONTINUED | OUTPATIENT
Start: 2023-08-15 | End: 2023-08-16 | Stop reason: HOSPADM

## 2023-08-15 RX ORDER — BUPIVACAINE HYDROCHLORIDE AND EPINEPHRINE 5; 5 MG/ML; UG/ML
INJECTION, SOLUTION EPIDURAL; INTRACAUDAL; PERINEURAL AS NEEDED
Status: DISCONTINUED | OUTPATIENT
Start: 2023-08-15 | End: 2023-08-15 | Stop reason: HOSPADM

## 2023-08-15 RX ORDER — FAMOTIDINE 10 MG/ML
20 INJECTION, SOLUTION INTRAVENOUS
Status: COMPLETED | OUTPATIENT
Start: 2023-08-15 | End: 2023-08-15

## 2023-08-15 RX ORDER — OXYCODONE AND ACETAMINOPHEN 7.5; 325 MG/1; MG/1
2 TABLET ORAL EVERY 4 HOURS PRN
Status: DISCONTINUED | OUTPATIENT
Start: 2023-08-15 | End: 2023-08-15 | Stop reason: HOSPADM

## 2023-08-15 RX ORDER — DOCUSATE SODIUM 100 MG/1
100 CAPSULE, LIQUID FILLED ORAL 2 TIMES DAILY PRN
Status: DISCONTINUED | OUTPATIENT
Start: 2023-08-15 | End: 2023-08-16 | Stop reason: HOSPADM

## 2023-08-15 RX ORDER — ONDANSETRON 4 MG/1
4 TABLET, FILM COATED ORAL EVERY 6 HOURS PRN
Status: DISCONTINUED | OUTPATIENT
Start: 2023-08-15 | End: 2023-08-16 | Stop reason: HOSPADM

## 2023-08-15 RX ORDER — BUPIVACAINE HYDROCHLORIDE 5 MG/ML
INJECTION, SOLUTION PERINEURAL
Status: COMPLETED | OUTPATIENT
Start: 2023-08-15 | End: 2023-08-15

## 2023-08-15 RX ORDER — PROPOFOL 10 MG/ML
INJECTION, EMULSION INTRAVENOUS AS NEEDED
Status: DISCONTINUED | OUTPATIENT
Start: 2023-08-15 | End: 2023-08-15 | Stop reason: SURG

## 2023-08-15 RX ORDER — ROCURONIUM BROMIDE 10 MG/ML
INJECTION, SOLUTION INTRAVENOUS AS NEEDED
Status: DISCONTINUED | OUTPATIENT
Start: 2023-08-15 | End: 2023-08-15 | Stop reason: SURG

## 2023-08-15 RX ORDER — SIMETHICONE 80 MG
80 TABLET,CHEWABLE ORAL 4 TIMES DAILY PRN
Status: DISCONTINUED | OUTPATIENT
Start: 2023-08-15 | End: 2023-08-16 | Stop reason: HOSPADM

## 2023-08-15 RX ORDER — SODIUM CHLORIDE, SODIUM LACTATE, POTASSIUM CHLORIDE, CALCIUM CHLORIDE 600; 310; 30; 20 MG/100ML; MG/100ML; MG/100ML; MG/100ML
100 INJECTION, SOLUTION INTRAVENOUS CONTINUOUS
Status: DISCONTINUED | OUTPATIENT
Start: 2023-08-15 | End: 2023-08-16 | Stop reason: HOSPADM

## 2023-08-15 RX ORDER — ALBUTEROL SULFATE 2.5 MG/3ML
2.5 SOLUTION RESPIRATORY (INHALATION) EVERY 4 HOURS PRN
Status: DISCONTINUED | OUTPATIENT
Start: 2023-08-15 | End: 2023-08-15 | Stop reason: HOSPADM

## 2023-08-15 RX ORDER — LIDOCAINE HYDROCHLORIDE 20 MG/ML
INJECTION, SOLUTION INFILTRATION; PERINEURAL AS NEEDED
Status: DISCONTINUED | OUTPATIENT
Start: 2023-08-15 | End: 2023-08-15 | Stop reason: SURG

## 2023-08-15 RX ORDER — SODIUM CHLORIDE, SODIUM LACTATE, POTASSIUM CHLORIDE, CALCIUM CHLORIDE 600; 310; 30; 20 MG/100ML; MG/100ML; MG/100ML; MG/100ML
9 INJECTION, SOLUTION INTRAVENOUS CONTINUOUS PRN
Status: DISCONTINUED | OUTPATIENT
Start: 2023-08-15 | End: 2023-08-16 | Stop reason: HOSPADM

## 2023-08-15 RX ORDER — DIPHENHYDRAMINE HCL 25 MG
25 CAPSULE ORAL NIGHTLY PRN
Status: DISCONTINUED | OUTPATIENT
Start: 2023-08-15 | End: 2023-08-16 | Stop reason: HOSPADM

## 2023-08-15 RX ORDER — MIDAZOLAM HYDROCHLORIDE 2 MG/2ML
2 INJECTION, SOLUTION INTRAMUSCULAR; INTRAVENOUS ONCE
Status: DISCONTINUED | OUTPATIENT
Start: 2023-08-15 | End: 2023-08-15 | Stop reason: HOSPADM

## 2023-08-15 RX ORDER — MORPHINE SULFATE 0.5 MG/ML
INJECTION, SOLUTION EPIDURAL; INTRATHECAL; INTRAVENOUS AS NEEDED
Status: DISCONTINUED | OUTPATIENT
Start: 2023-08-15 | End: 2023-08-15 | Stop reason: SURG

## 2023-08-15 RX ORDER — SODIUM CHLORIDE 0.9 % (FLUSH) 0.9 %
10 SYRINGE (ML) INJECTION EVERY 12 HOURS SCHEDULED
Status: DISCONTINUED | OUTPATIENT
Start: 2023-08-15 | End: 2023-08-15 | Stop reason: HOSPADM

## 2023-08-15 RX ORDER — DIPHENHYDRAMINE HYDROCHLORIDE 50 MG/ML
12.5 INJECTION INTRAMUSCULAR; INTRAVENOUS
Status: DISCONTINUED | OUTPATIENT
Start: 2023-08-15 | End: 2023-08-15 | Stop reason: HOSPADM

## 2023-08-15 RX ORDER — FENTANYL CITRATE 50 UG/ML
50 INJECTION, SOLUTION INTRAMUSCULAR; INTRAVENOUS ONCE
Status: DISCONTINUED | OUTPATIENT
Start: 2023-08-15 | End: 2023-08-16 | Stop reason: HOSPADM

## 2023-08-15 RX ORDER — SODIUM CHLORIDE 9 MG/ML
40 INJECTION, SOLUTION INTRAVENOUS AS NEEDED
Status: DISCONTINUED | OUTPATIENT
Start: 2023-08-15 | End: 2023-08-15 | Stop reason: HOSPADM

## 2023-08-15 RX ORDER — ONDANSETRON 2 MG/ML
4 INJECTION INTRAMUSCULAR; INTRAVENOUS EVERY 6 HOURS PRN
Status: DISCONTINUED | OUTPATIENT
Start: 2023-08-15 | End: 2023-08-16 | Stop reason: HOSPADM

## 2023-08-15 RX ORDER — DEXAMETHASONE SODIUM PHOSPHATE 4 MG/ML
4 INJECTION, SOLUTION INTRA-ARTICULAR; INTRALESIONAL; INTRAMUSCULAR; INTRAVENOUS; SOFT TISSUE ONCE AS NEEDED
Status: COMPLETED | OUTPATIENT
Start: 2023-08-15 | End: 2023-08-15

## 2023-08-15 RX ORDER — ONDANSETRON 2 MG/ML
4 INJECTION INTRAMUSCULAR; INTRAVENOUS ONCE AS NEEDED
Status: COMPLETED | OUTPATIENT
Start: 2023-08-15 | End: 2023-08-15

## 2023-08-15 RX ORDER — NALOXONE HCL 0.4 MG/ML
0.4 VIAL (ML) INJECTION AS NEEDED
Status: DISCONTINUED | OUTPATIENT
Start: 2023-08-15 | End: 2023-08-15 | Stop reason: HOSPADM

## 2023-08-15 RX ORDER — HYDROCODONE BITARTRATE AND ACETAMINOPHEN 5; 325 MG/1; MG/1
1 TABLET ORAL ONCE AS NEEDED
Status: DISCONTINUED | OUTPATIENT
Start: 2023-08-15 | End: 2023-08-15 | Stop reason: HOSPADM

## 2023-08-15 RX ORDER — ALBUTEROL SULFATE 2.5 MG/3ML
2.5 SOLUTION RESPIRATORY (INHALATION) ONCE AS NEEDED
Status: DISCONTINUED | OUTPATIENT
Start: 2023-08-15 | End: 2023-08-15 | Stop reason: HOSPADM

## 2023-08-15 RX ORDER — OXYCODONE HYDROCHLORIDE AND ACETAMINOPHEN 5; 325 MG/1; MG/1
1 TABLET ORAL EVERY 4 HOURS PRN
Status: DISCONTINUED | OUTPATIENT
Start: 2023-08-15 | End: 2023-08-16 | Stop reason: HOSPADM

## 2023-08-15 RX ORDER — FENTANYL CITRATE 50 UG/ML
INJECTION, SOLUTION INTRAMUSCULAR; INTRAVENOUS AS NEEDED
Status: DISCONTINUED | OUTPATIENT
Start: 2023-08-15 | End: 2023-08-15 | Stop reason: SURG

## 2023-08-15 RX ADMIN — ROCURONIUM BROMIDE 50 MG: 10 INJECTION, SOLUTION INTRAVENOUS at 08:06

## 2023-08-15 RX ADMIN — MORPHINE SULFATE 150 MCG: 0.5 INJECTION, SOLUTION EPIDURAL; INTRATHECAL; INTRAVENOUS at 07:54

## 2023-08-15 RX ADMIN — ONDANSETRON 4 MG: 2 INJECTION INTRAMUSCULAR; INTRAVENOUS at 07:22

## 2023-08-15 RX ADMIN — FENTANYL CITRATE 25 MCG: 50 INJECTION, SOLUTION INTRAMUSCULAR; INTRAVENOUS at 08:18

## 2023-08-15 RX ADMIN — SODIUM CHLORIDE, POTASSIUM CHLORIDE, SODIUM LACTATE AND CALCIUM CHLORIDE 9 ML/HR: 600; 310; 30; 20 INJECTION, SOLUTION INTRAVENOUS at 07:22

## 2023-08-15 RX ADMIN — SUGAMMADEX 200 MG: 100 INJECTION, SOLUTION INTRAVENOUS at 09:18

## 2023-08-15 RX ADMIN — OXYCODONE HYDROCHLORIDE AND ACETAMINOPHEN 1 TABLET: 5; 325 TABLET ORAL at 11:29

## 2023-08-15 RX ADMIN — PROPOFOL INJECTABLE EMULSION 200 MG: 10 INJECTION, EMULSION INTRAVENOUS at 08:06

## 2023-08-15 RX ADMIN — FENTANYL CITRATE 25 MCG: 50 INJECTION, SOLUTION INTRAMUSCULAR; INTRAVENOUS at 08:34

## 2023-08-15 RX ADMIN — FAMOTIDINE 20 MG: 10 INJECTION INTRAVENOUS at 07:22

## 2023-08-15 RX ADMIN — LIDOCAINE HYDROCHLORIDE 100 MG: 20 INJECTION, SOLUTION INFILTRATION; PERINEURAL at 08:06

## 2023-08-15 RX ADMIN — FENTANYL CITRATE 25 MCG: 50 INJECTION, SOLUTION INTRAMUSCULAR; INTRAVENOUS at 08:59

## 2023-08-15 RX ADMIN — ROCURONIUM BROMIDE 10 MG: 10 INJECTION, SOLUTION INTRAVENOUS at 08:36

## 2023-08-15 RX ADMIN — FENTANYL CITRATE 25 MCG: 50 INJECTION, SOLUTION INTRAMUSCULAR; INTRAVENOUS at 08:51

## 2023-08-15 RX ADMIN — CEFAZOLIN SODIUM 2 G: 2 SOLUTION INTRAVENOUS at 08:00

## 2023-08-15 RX ADMIN — SODIUM CHLORIDE, POTASSIUM CHLORIDE, SODIUM LACTATE AND CALCIUM CHLORIDE 100 ML/HR: 600; 310; 30; 20 INJECTION, SOLUTION INTRAVENOUS at 19:57

## 2023-08-15 RX ADMIN — BUPIVACAINE HYDROCHLORIDE 1 ML: 5 INJECTION, SOLUTION PERINEURAL at 07:54

## 2023-08-15 RX ADMIN — SODIUM CHLORIDE, POTASSIUM CHLORIDE, SODIUM LACTATE AND CALCIUM CHLORIDE 100 ML/HR: 600; 310; 30; 20 INJECTION, SOLUTION INTRAVENOUS at 10:10

## 2023-08-15 RX ADMIN — OXYCODONE HYDROCHLORIDE AND ACETAMINOPHEN 1 TABLET: 5; 325 TABLET ORAL at 19:57

## 2023-08-15 RX ADMIN — SODIUM CHLORIDE, POTASSIUM CHLORIDE, SODIUM LACTATE AND CALCIUM CHLORIDE 125 ML/HR: 600; 310; 30; 20 INJECTION, SOLUTION INTRAVENOUS at 11:16

## 2023-08-15 RX ADMIN — DEXAMETHASONE SODIUM PHOSPHATE 4 MG: 4 INJECTION, SOLUTION INTRAMUSCULAR; INTRAVENOUS at 07:22

## 2023-08-15 RX ADMIN — FENTANYL CITRATE 50 MCG: 50 INJECTION, SOLUTION INTRAMUSCULAR; INTRAVENOUS at 09:53

## 2023-08-15 RX ADMIN — SODIUM CHLORIDE, POTASSIUM CHLORIDE, SODIUM LACTATE AND CALCIUM CHLORIDE 125 ML/HR: 600; 310; 30; 20 INJECTION, SOLUTION INTRAVENOUS at 12:39

## 2023-08-15 RX ADMIN — HYDROMORPHONE HYDROCHLORIDE 0.25 MG: 1 INJECTION, SOLUTION INTRAMUSCULAR; INTRAVENOUS; SUBCUTANEOUS at 10:08

## 2023-08-15 NOTE — H&P
Patient Care Team:  Sarah Berger MD as PCP - General (Obstetrics and Gynecology)  Shruthi Martinez MD (Internal Medicine)    Chief complaint recurrent  dysplasia, RUMA 3       HPI:    42 yo est pt here for TLH. . She was scheduled for a TLH in 2023 but had to cancel because she had a URI. She is feeling much better and is now scheduled for 8/15/2023.  She had a ASCUS + HPV pap and RUMA 2 on bx that resulted in a LEEP in 2020, which was negative for dysplasia. Her ECC was negative. She then continued to have abnormal pap smears and in 2022 had a CKC with RUMA 3, neg margins and neg ECC. Her first 6 month repeat pap smear in 2023 was LGSIL and neg HPV and negative biopsies.. She would like to proceed with TLH, since she has had abnormal pap smears off and on since . She desires to keep her ovaries if they appear normal at the time of her procedure.       PMH:   Past Medical History:   Diagnosis Date    Abnormal Pap smear of cervix 2018    ASCUS + HPV =- no follow up    Asthma     Cervical dysplasia     RUMA 2 on bx, neg LEEP, RUMA 3 on CKC, neg margins    GERD (gastroesophageal reflux disease)     S/P LEEP 2020         PSH:   Past Surgical History:   Procedure Laterality Date    CERVICAL BIOPSY  W/ LOOP ELECTRODE EXCISION      CERVICAL CONIZATION N/A 2022    Procedure: CERVICAL CONIZATION;  Surgeon: Sarah Berger MD;  Location: [facility];  Service: Obstetrics/Gynecology;  Laterality: N/A;     SECTION      placenta previa     SECTION       SECTION      ENDOSCOPY N/A 2021    Procedure: ESOPHAGOGASTRODUODENOSCOPY with biopsies;  Surgeon: Lee Tubbs MD;  Location: [facility] OR;  Service: Gastroenterology;  Laterality: N/A;  Reflux  Biopsies: gastric, distal esophagus    LEEP N/A 2020    Procedure: LOOP ELECTROCAUTERY EXCISION PROCEDURE;  Surgeon: Sarah Berger MD;  Location: [facility] OR;  Service:  Obstetrics/Gynecology;  Laterality: N/A;       SoHx:   Social History     Socioeconomic History    Marital status:     Number of children: 2   Tobacco Use    Smoking status: Never    Smokeless tobacco: Never   Vaping Use    Vaping Use: Never used   Substance and Sexual Activity    Alcohol use: Never    Drug use: Never    Sexual activity: Not Currently     Partners: Male     Birth control/protection: Condom       FHx:   Family History   Problem Relation Age of Onset    Breast cancer Neg Hx     Ovarian cancer Neg Hx     Uterine cancer Neg Hx     Colon cancer Neg Hx     Prostate cancer Neg Hx     Deep vein thrombosis Neg Hx     Malig Hyperthermia Neg Hx      OB History         2    Para   2    Term   2       0    AB   0    Living   2        SAB   0    IAB   0    Ectopic   0    Molar   0    Multiple        Live Births   2           Obstetric Comments   No plans                Menarche: 15  Current contraception: none  History of abnormal Pap smear: yes- LEEP 2020- neg path, 2022- CKC with RUMA 3, neg margins and ECC  History of abnormal mammogram: no  Family history of uterine, colon or ovarian cancer: no  Family history of breast cancer: no  H/o STDs: none  Last pap: 2023- LGSIL, neg HPV  Gardasil:missed  JIGNA: none    Allergies: Amoxapine and Amoxicillin    Medications:   No current facility-administered medications on file prior to encounter.     Current Outpatient Medications on File Prior to Encounter   Medication Sig Dispense Refill    norethindrone-ethinyl estradiol-ferrous fumarate (LOESTIN 24 FE) 1-20 MG-MCG(24) per tablet Take 1 tablet by mouth Daily. (Patient not taking: Reported on 2023) 84 tablet 3    pantoprazole (PROTONIX) 40 MG EC tablet Take 1 tablet by mouth 2 (Two) Times a Day Before Meals. (Patient taking differently: Take 1 tablet by mouth 2 (Two) Times a Day. Take dos) 180 tablet 3       There were no vitals taken for this visit.    Review of Systems   Constitutional:   Positive for activity change.   Respiratory:  Negative for chest tightness, shortness of breath, wheezing and stridor.    Genitourinary:  Negative for pelvic pain, urgency and vaginal bleeding.     Physical Exam  Vitals and nursing note reviewed.   Constitutional:       Appearance: Normal appearance. She is well-developed and normal weight.   HENT:      Head: Normocephalic and atraumatic.   Eyes:      General: No scleral icterus.     Conjunctiva/sclera: Conjunctivae normal.   Neck:      Thyroid: No thyromegaly.   Cardiovascular:      Rate and Rhythm: Normal rate and regular rhythm.   Pulmonary:      Effort: Pulmonary effort is normal.      Breath sounds: Normal breath sounds.   Abdominal:      General: Bowel sounds are normal. There is no distension.      Palpations: Abdomen is soft. There is no mass.      Tenderness: There is no abdominal tenderness. There is no guarding or rebound.      Hernia: No hernia is present.   Musculoskeletal:      Cervical back: Neck supple.   Skin:     General: Skin is warm and dry.   Neurological:      Mental Status: She is alert and oriented to person, place, and time.   Psychiatric:         Mood and Affect: Mood normal.         Behavior: Behavior normal.         Thought Content: Thought content normal.         Judgment: Judgment normal.       Debilities/Disabilities Identified: None    Labs:     Lab Results (last 7 days)       ** No results found for the last 168 hours. **            Assessment/Plan:   1. H/O RUMA 3- s/p CKC in 8/2022 with RUMA 3 and neg ECC. Pt declines serial paps/ECC and wants to proceed with TLH/BS , possible IRINA, possible JOSÉ.Risks, benefits and alternatives of the procedure were discussed, including , but not limited to: infection, bleeding, transfusion, injury to adjacent structures, laparotomy, possible nondiagnostic findings, possible findings of unexpected malignancy, reoperation, recurrent symptoms, thromboembolic events, anaeasthetic complications and death.  Pre/intra/postop course was reviewed and all questions answered       I discussed the patients findings and my recommendations with patient.     Sarah Berger MD  08/14/23  20:06 EDT

## 2023-08-15 NOTE — INTERVAL H&P NOTE
H&P reviewed. The patient was examined and there are no changes to the H&P.  /74   Pulse 98   Temp 98.3 øF (36.8 øC) (Oral)   Resp 14   Wt 78.3 kg (172 lb 9.6 oz)   LMP 07/19/2023 (Exact Date)   SpO2 100%   BMI 29.63 kg/mý   Procedure reviewed and all questions answered via a .   Sarah Berger MD

## 2023-08-15 NOTE — ANESTHESIA POSTPROCEDURE EVALUATION
Patient: Marilee Kraus    Procedure Summary       Date: 08/15/23 Room / Location:  [facility] OR 2 /  [facility] OR    Anesthesia Start: 0800 Anesthesia Stop: 0937    Procedure: TOTAL LAPAROSCOPIC HYSTERECTOMY , bilateral salpingectomy, lysis of adhesions (Abdomen) Diagnosis:       High grade squamous intraepithelial lesion (HGSIL), grade 3 RUMA, on biopsy of cervix      Abnormal cervical Papanicolaou smear, unspecified abnormal pap finding      (High grade squamous intraepithelial lesion (HGSIL), grade 3 RUMA, on biopsy of cervix [D06.9])      (Abnormal cervical Papanicolaou smear, unspecified abnormal pap finding [R87.619])    Surgeons: Sarah Berger MD Provider: Dianne Cole CRNA    Anesthesia Type: general ASA Status: 2            Anesthesia Type: general    Vitals  Vitals Value Taken Time   /63 08/15/23 1020   Temp 97.8 øF (36.6 øC) 08/15/23 0931   Pulse 77 08/15/23 1030   Resp 16 08/15/23 1020   SpO2 99 % 08/15/23 1030   Vitals shown include unvalidated device data.        Post Anesthesia Care and Evaluation    Patient location during evaluation: PACU  Patient participation: complete - patient participated  Level of consciousness: awake and alert  Pain score: 4  Pain management: adequate    Airway patency: patent  Anesthetic complications: No anesthetic complications  PONV Status: none  Cardiovascular status: acceptable  Respiratory status: acceptable  Hydration status: acceptable

## 2023-08-15 NOTE — ANESTHESIA PROCEDURE NOTES
Spinal Block    Pre-sedation assessment completed: 8/15/2023 7:42 AM    Patient reassessed immediately prior to procedure    Patient location during procedure: pre-op  Start Time: 8/15/2023 7:42 AM  Stop Time: 8/15/2023 7:54 AM  Performed By  CRNA/CAA: Dianne Cole CRNA  Preanesthetic Checklist  Completed: patient identified, IV checked, risks and benefits discussed, surgical consent, monitors and equipment checked, pre-op evaluation and timeout performed  Spinal Block Prep:  Patient Position:sitting  Sterile Tech:cap, gloves, mask and sterile barriers  Prep:Chloraprep  Patient Monitoring:blood pressure monitoring, continuous pulse oximetry and EKG    Spinal Block Procedure  Approach:midline  Guidance:landmark technique  Location:L4-L5  Needle Type:Sprotte  Needle Gauge:24 G  Placement of Spinal needle event:cerebrospinal fluid aspirated  Paresthesia: left and transient  Fluid Appearance:clear  Medications: bupivacaine (MARCAINE) injection 0.5% - Injection   1 mL - 8/15/2023 7:54:00 AM   Post Assessment  Patient Tolerance:patient tolerated the procedure well with no apparent complications  Complications no

## 2023-08-15 NOTE — ANESTHESIA PROCEDURE NOTES
Airway  Urgency: elective    Date/Time: 8/15/2023 8:10 AM  Airway not difficult    General Information and Staff    Patient location during procedure: OR  CRNA/CAA: Dianne Cole CRNA    Indications and Patient Condition  Indications for airway management: airway protection    Preoxygenated: yes  Mask difficulty assessment: 1 - vent by mask    Final Airway Details  Final airway type: endotracheal airway      Successful airway: ETT  Cuffed: yes   Successful intubation technique: direct laryngoscopy  Facilitating devices/methods: intubating stylet  Endotracheal tube insertion site: oral  Blade: Rigoberto  Blade size: 3  ETT size (mm): 7.0  Cormack-Lehane Classification: grade I - full view of glottis  Placement verified by: chest auscultation and capnometry   Cuff volume (mL): 9  Measured from: lips  ETT/EBT  to lips (cm): 20  Number of attempts at approach: 1  Assessment: lips, teeth, and gum same as pre-op and atraumatic intubation

## 2023-08-15 NOTE — OP NOTE
Subjective     Date of Service:  08/15/23  Time of Service:  09:27 EDT    Surgical Staff: Surgeon(s) and Role:     * Sarah Berger MD - Primary   Additional Staff: Rubén Tolliver CFA   Pre-operative diagnosis(es): Pre-Op Diagnosis Codes:     * High grade squamous intraepithelial lesion (HGSIL), grade 3 RUMA, on biopsy of cervix [D06.9]     * Abnormal cervical Papanicolaou smear, unspecified abnormal pap finding [R87.619]     Post-operative diagnosis(es): Post-Op Diagnosis Codes:     * High grade squamous intraepithelial lesion (HGSIL), grade 3 RUMA, on biopsy of cervix [D06.9]     * Abnormal cervical Papanicolaou smear, unspecified abnormal pap finding [R87.619]   Procedure(s): Procedure(s):  TOTAL LAPAROSCOPIC HYSTERECTOMY , bilateral salpingectomy, lysis of adhesions     Antibiotics: cefazolin (Ancef)ordered on call to OR     Anesthesia: Type: Choice  ASA:  II     Objective      Operative findings: Omental adhesions to anterior abdominal wall  Normal uterus, tubes and ovaries  Normal appendix and liver edge  Normal peritoneal surfaces       Specimens removed: ID Type Source Tests Collected by Time   A (Not marked as sent) :  Tissue Fallopian Tube, Left TISSUE PATHOLOGY EXAM Sarah Berger MD 8/15/2023 0856   B (Not marked as sent) :  Tissue Fallopian Tube, Right TISSUE PATHOLOGY EXAM Sarah Berger MD 8/15/2023 0856   C (Not marked as sent) :  Tissue Uterus with Cervix TISSUE PATHOLOGY EXAM Sarah Berger MD 8/15/2023 0856      Fluid Intake: 800mL   Output: Documented Output  Est. Blood Loss 25mL  Urine Output 600 mL clear at the end of the procedure      I/O this shift:  In: 400 [I.V.:350; IV Piggyback:50]  Out: 625 [Urine:600; Blood:25]     Blood products used: No   Drains: Urethral Catheter Silicone 16 Fr. (Active)      Implant Information: Nothing was implanted during the procedure   Complications: None apparent   Intraoperative consult(s): none   Condition: stable    Disposition: to PACU and then admit to medical - surgical floor         Assessment & Plan     After informed consent was obtained and the patient's pregnancy test was negative, she received intrathecal narcotics and was taken to the operating room where general endotracheal anesthesia was administered without difficulty.  She was placed in yellowfin stirrups and prepped and draped in normal sterile fashion.  A Whyte catheter was inserted.  The Delilah uterine manipulator system was then placed according to 's instructions by the assistant.  An infra umbilical skin incision was made and the Veress needle was inserted at a 45 degree angle.  The water drop test was positive.  Opening pressures were low and insufflation was begun until there was tympany in all 4 quadrants.  A 5 mm clear view trocar was then placed under direct visualization direct entry into the abdomen was confirmed with the laparoscope.  An 10 mm left lower quadrant and a 5 mm right lower quadrant port were then placed under direct visualization.  The patient was then placed in Trendelenburg.  The liver edge and appendix were both normal. There was a curtain of filmy omental adhesions to the anterior abdominal wall that was taken down with the Harmonic scalpel.  All peritoneal surfaces were normal.  Survey of the pelvis revealed normal uterus, tubes and ovaries.   Both ureters were located and noted to be peristalsing normally.  The bowel was moved out of the pelvis.  The harmonic scalpel was then used to create a bilateral salpingectomy.  The round ligaments on both sides were then coagulated and cut with harmonic scalpel and the anterior leaf of the broad ligament was then taken down and the bladder was dissected down sharply past the level of the colpotomy cup.  The uterine ovarian arteries were then coagulated and cut.  The uterine arteries were then skeletonized and pretreated at the level of the cup with the bipolar cautery.   Blanching of the uterus consistent with bilateral ligation of the uterine arteries was noted.  The anterior colpotomy was then made with a active blade of the harmonic scalpel and taken around circumferentially until the uterus was detached.  The Delilah uterine manipulator system was then removed.  The uterus was then pulled out of the pelvis and into the vagina to try to find a continuing means of pneumoperitoneum.  The cuff was then closed from above with a series of 0 Vicryl sutures using extracorporeal knot tying.  The specimen was then removed from the vagina and sent off for permanent section.  The cuff was explored from below while watching from above.  Good closure was noted and no evidence of defect was appreciated. Both ureters were located and noted to be peristalsing normally.     The pelvis was then copiously irrigated and cleared of all clot and debris and all operative sites were hemostatic.  The procedure was deemed complete.  All instruments were removed under direct visualization and gas was allowed to escape the abdomen.  The fascia of the 10 mm port was closed with 2-0 Vicryl.  The incisions were closed with 4-0 Monocryl.  Sponge, lap, needle and instrument counts were all correct.  The patient was extubated without difficulty and taken to recovery room in good condition.  All counts were correct for the procedure.  The patient will be transferred to the women[facility] center to be in observation overnight. Assistant: Rubén Tolliver CSA was responsible for performing the following activities: Retraction, Suction, Irrigation, Suturing, Closing, Placing Dressing, and Held/Positioned Camera and their skilled assistance was necessary for the success of this case.     Sarah Berger MD

## 2023-08-15 NOTE — PLAN OF CARE
Goal Outcome Evaluation:              Outcome Evaluation: pt came from [facility] today - VSS; kaiser in place - to be removed in AM, see orders.  RA.  Minimal pain on my shift. tolerating diet.  Good UOP

## 2023-08-15 NOTE — ANESTHESIA PREPROCEDURE EVALUATION
Anesthesia Evaluation     Patient summary reviewed and Nursing notes reviewed   no history of anesthetic complications:   NPO Solid Status: > 8 hours  NPO Liquid Status: > 8 hours           Airway   Mallampati: II  TM distance: >3 FB  Neck ROM: full  No difficulty expected  Dental - normal exam     Pulmonary - normal exam    breath sounds clear to auscultation  (+) pneumonia (Pneumonia due to COVID-19 virus) resolved , asthma (Rescue inhaler - last used more than 1 month ago),recent URI (Had a cough x 1 month ago, prescribed abx; been done for a few weeks) resolved    ROS comment: Pneumonia due to COVID-19 virus  Cardiovascular - negative cardio ROS and normal exam  Exercise tolerance: good (4-7 METS)    ECG reviewed  Rhythm: regular  Rate: normal      ROS comment: 22 ECG    HEART RATE= 74  bpm  RR Interval= 812  ms  DC Interval= 198  ms  P Horizontal Axis= 0  deg  P Front Axis= 34  deg  QRSD Interval= 100  ms  QT Interval= 377  ms  QRS Axis= 28  deg  T Wave Axis= 48  deg  - NORMAL ECG -  Sinus rhythm  NO SIGNIFICANT CHANGE FROM PREVIOUS ECG          Neuro/Psych- negative ROS  GI/Hepatic/Renal/Endo    (+) GERD well controlled    ROS Comment: Epigastric pain  Gastroesophageal reflux disease with esophagitis without hemorrhage  Cytokine release syndrome, grade 1  Chronic idiopathic constipation  Helicobacter pylori gastritis            Musculoskeletal (-) negative ROS    Abdominal  - normal exam   Substance History - negative use     OB/GYN      Comment: S/P conization of cervix  High grade squamous intraepithelial lesion (HGSIL), grade 3 RUMA, on biopsy of cervix        Other      history of cancer (One surgery to prevent cervical cancer)      Other Comment: Breast pain  Dysplasia of cervix, high grade RUMA 2  S/P LEEP                      Anesthesia Plan    ASA 2     general and spinal     intravenous induction     Anesthetic plan, risks, benefits, and alternatives have been provided, discussed and  informed consent has been obtained with: patient.    Use of blood products discussed with patient  Consented to blood products.    Plan discussed with CRNA.      CODE STATUS:

## 2023-08-15 NOTE — ANESTHESIA PROCEDURE NOTES
Spinal Block    Pre-sedation assessment completed: 8/15/2023 7:42 AM    Patient reassessed immediately prior to procedure    Patient location during procedure: pre-op  Start Time: 8/15/2023 7:48 AM  Stop Time: 8/15/2023 7:54 AM  Performed By  CRNA/CAA: Dianne Cole CRNA  Preanesthetic Checklist  Completed: patient identified, IV checked, site marked, risks and benefits discussed, surgical consent, monitors and equipment checked, pre-op evaluation and timeout performed  Spinal Block Prep:  Patient Position:sitting  Sterile Tech:cap, gloves and sterile barriers  Prep:Chloraprep  Patient Monitoring:EKG, continuous pulse oximetry and blood pressure monitoring    Spinal Block Procedure  Approach:midline  Guidance:landmark technique  Location:L4-L5  Needle Type:Sprotte  Needle Gauge:24 G  Placement of Spinal needle event:cerebrospinal fluid aspirated  Paresthesia: no  Fluid Appearance:clear     Post Assessment  Patient Tolerance:patient tolerated the procedure well with no apparent complications  Complications no

## 2023-08-16 VITALS
TEMPERATURE: 97.7 F | SYSTOLIC BLOOD PRESSURE: 101 MMHG | BODY MASS INDEX: 29.37 KG/M2 | DIASTOLIC BLOOD PRESSURE: 76 MMHG | HEIGHT: 64 IN | HEART RATE: 80 BPM | RESPIRATION RATE: 17 BRPM | WEIGHT: 172 LBS | OXYGEN SATURATION: 98 %

## 2023-08-16 PROBLEM — D06.9 HIGH GRADE SQUAMOUS INTRAEPITHELIAL LESION (HGSIL), GRADE 3 CIN, ON BIOPSY OF CERVIX: Status: RESOLVED | Noted: 2023-03-27 | Resolved: 2023-08-16

## 2023-08-16 PROBLEM — R87.610 ASCUS WITH POSITIVE HIGH RISK HPV CERVICAL: Status: RESOLVED | Noted: 2017-07-05 | Resolved: 2023-08-16

## 2023-08-16 PROBLEM — R87.810 ASCUS WITH POSITIVE HIGH RISK HPV CERVICAL: Status: RESOLVED | Noted: 2017-07-05 | Resolved: 2023-08-16

## 2023-08-16 LAB
ANION GAP SERPL CALCULATED.3IONS-SCNC: 8.3 MMOL/L (ref 5–15)
BUN SERPL-MCNC: 9 MG/DL (ref 6–20)
BUN/CREAT SERPL: 15 (ref 7–25)
CALCIUM SPEC-SCNC: 8.2 MG/DL (ref 8.6–10.5)
CHLORIDE SERPL-SCNC: 107 MMOL/L (ref 98–107)
CO2 SERPL-SCNC: 24.7 MMOL/L (ref 22–29)
CREAT SERPL-MCNC: 0.6 MG/DL (ref 0.57–1)
DEPRECATED RDW RBC AUTO: 43.9 FL (ref 37–54)
EGFRCR SERPLBLD CKD-EPI 2021: 115.1 ML/MIN/1.73
ERYTHROCYTE [DISTWIDTH] IN BLOOD BY AUTOMATED COUNT: 12.6 % (ref 12.3–15.4)
GLUCOSE SERPL-MCNC: 105 MG/DL (ref 65–99)
HCT VFR BLD AUTO: 40.4 % (ref 34–46.6)
HGB BLD-MCNC: 13 G/DL (ref 12–15.9)
LAB AP CASE REPORT: NORMAL
LAB AP CLINICAL INFORMATION: NORMAL
LAB AP DIAGNOSIS COMMENT: NORMAL
MCH RBC QN AUTO: 30.7 PG (ref 26.6–33)
MCHC RBC AUTO-ENTMCNC: 32.2 G/DL (ref 31.5–35.7)
MCV RBC AUTO: 95.5 FL (ref 79–97)
PATH REPORT.FINAL DX SPEC: NORMAL
PATH REPORT.GROSS SPEC: NORMAL
PLATELET # BLD AUTO: 225 10*3/MM3 (ref 140–450)
PMV BLD AUTO: 11.7 FL (ref 6–12)
POTASSIUM SERPL-SCNC: 3.9 MMOL/L (ref 3.5–5.2)
RBC # BLD AUTO: 4.23 10*6/MM3 (ref 3.77–5.28)
SODIUM SERPL-SCNC: 140 MMOL/L (ref 136–145)
WBC NRBC COR # BLD: 12.77 10*3/MM3 (ref 3.4–10.8)

## 2023-08-16 PROCEDURE — G0378 HOSPITAL OBSERVATION PER HR: HCPCS

## 2023-08-16 PROCEDURE — 99024 POSTOP FOLLOW-UP VISIT: CPT | Performed by: NURSE PRACTITIONER

## 2023-08-16 PROCEDURE — 85027 COMPLETE CBC AUTOMATED: CPT | Performed by: OBSTETRICS & GYNECOLOGY

## 2023-08-16 PROCEDURE — 80048 BASIC METABOLIC PNL TOTAL CA: CPT | Performed by: OBSTETRICS & GYNECOLOGY

## 2023-08-16 RX ORDER — DOCUSATE SODIUM 100 MG/1
100 CAPSULE, LIQUID FILLED ORAL 2 TIMES DAILY PRN
Qty: 60 CAPSULE | Refills: 0 | Status: SHIPPED | OUTPATIENT
Start: 2023-08-16

## 2023-08-16 RX ORDER — SIMETHICONE 80 MG
80 TABLET,CHEWABLE ORAL 4 TIMES DAILY PRN
Qty: 30 TABLET | Refills: 0 | Status: CANCELLED | OUTPATIENT
Start: 2023-08-16

## 2023-08-16 RX ORDER — IBUPROFEN 800 MG/1
800 TABLET ORAL EVERY 8 HOURS PRN
Qty: 30 TABLET | Refills: 0 | Status: SHIPPED | OUTPATIENT
Start: 2023-08-16

## 2023-08-16 RX ORDER — OXYCODONE HYDROCHLORIDE AND ACETAMINOPHEN 5; 325 MG/1; MG/1
1 TABLET ORAL EVERY 4 HOURS PRN
Qty: 12 TABLET | Refills: 0 | Status: CANCELLED | OUTPATIENT
Start: 2023-08-16 | End: 2023-08-22

## 2023-08-16 RX ORDER — SIMETHICONE 80 MG
80 TABLET,CHEWABLE ORAL 4 TIMES DAILY PRN
Qty: 30 TABLET | Refills: 0 | Status: SHIPPED | OUTPATIENT
Start: 2023-08-16

## 2023-08-16 RX ORDER — ONDANSETRON 4 MG/1
4 TABLET, FILM COATED ORAL EVERY 6 HOURS PRN
Qty: 15 TABLET | Refills: 0 | Status: SHIPPED | OUTPATIENT
Start: 2023-08-16

## 2023-08-16 RX ORDER — PSEUDOEPHEDRINE HCL 30 MG
100 TABLET ORAL 2 TIMES DAILY PRN
Qty: 60 CAPSULE | Refills: 0 | Status: CANCELLED | OUTPATIENT
Start: 2023-08-16

## 2023-08-16 RX ORDER — OXYCODONE HYDROCHLORIDE AND ACETAMINOPHEN 5; 325 MG/1; MG/1
1 TABLET ORAL EVERY 4 HOURS PRN
Qty: 12 TABLET | Refills: 0 | Status: SHIPPED | OUTPATIENT
Start: 2023-08-16 | End: 2023-08-22

## 2023-08-16 RX ADMIN — SODIUM CHLORIDE, POTASSIUM CHLORIDE, SODIUM LACTATE AND CALCIUM CHLORIDE 125 ML/HR: 600; 310; 30; 20 INJECTION, SOLUTION INTRAVENOUS at 06:28

## 2023-08-16 NOTE — PLAN OF CARE
Goal Outcome Evaluation:  Plan of Care Reviewed With: patient        Progress: improving  Outcome Evaluation: VSS, up ad arlene ambulaing in room, denies pain/discomfort, F/C removed this am, pt voiding without difficulty, c/o small amount of vaginal bleeding post ambulation, MD notified and pt to all office if bleeding increases to menstrual flow, pt educated and pt verbalized understanding. Spouse at bedside.

## 2023-08-16 NOTE — PROGRESS NOTES
"Patient: Marilee Kraus  Procedure(s):  TOTAL LAPAROSCOPIC HYSTERECTOMY , bilateral salpingectomy, lysis of adhesions  Anesthesia type: general    Patient location: [facility] Surgical Floor  Vitals:    08/15/23 1920 08/15/23 1927 08/15/23 2258 08/16/23 0610   BP:  107/65 100/58 92/55   BP Location:  Left arm Left arm Left arm   Patient Position:  Lying Lying Lying   Pulse: 84 80 80 75   Resp: 17 15 16 15   Temp:  98 øF (36.7 øC) 99.8 øF (37.7 øC) 98.7 øF (37.1 øC)   TempSrc:  Oral Oral Oral   SpO2: 96% 96% 96% 95%   Weight:  78 kg (172 lb)     Height:  162.6 cm (64\")       Level of consciousness: awake, alert, and oriented    Post-anesthesia pain: adequate analgesia  Airway patency: patent  Respiratory: unassisted  Cardiovascular: stable and blood pressure at baseline  Hydration: euvolemic    Anesthetic complications: no  "

## 2023-08-16 NOTE — DISCHARGE SUMMARY
Date of Admission: 8/15/2023  6:26 AM      Date of Discharge:  8/16/2023    Admitting Service: [facility]    Admission Diagnosis: TLH-BS w/ ELIAZAR  Discharge Diagnosis: TLH-BS w/ELIAZAR    Presenting Problem/History of Present Illness  High grade squamous intraepithelial lesion (HGSIL), grade 3 RUMA, on biopsy of cervix [D06.9]  Abnormal cervical Papanicolaou smear, unspecified abnormal pap finding [R87.619]       Hospital Course  Patient is a 43 y.o. 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  Continued to have abnormal pap smears in 2022; s/p CKC with CIN3, neg margins and neg ECC. Repeat pap in 1/2023 showed LGSIL/neg HPV.  Due to hx of abnormal pap smears since 2018; desires TLH.  She is now s/p TLH-BS; wants to keep her ovaries.  Feeling good today and desires to go home.  Pain controlled with medication; ambulating without problems.  Tolerating solids/liquids; +BS and flatus; voiding well.  Reviewed post-op restrictions and reasons to call MD.    Procedures Performed  Procedure(s):  TOTAL LAPAROSCOPIC HYSTERECTOMY , bilateral salpingectomy, lysis of adhesions       Consults:   Consults       No orders found for last 30 day(s).            Pertinent Test Results: labs: CBC    Condition on Discharge:  stable    Vital Signs  Temp:  [97.2 øF (36.2 øC)-99.8 øF (37.7 øC)] 98.7 øF (37.1 øC)  Heart Rate:  [70-84] 75  Resp:  [14-17] 15  BP: ()/(55-67) 92/55    Physical Exam:     General Appearance:    Alert, cooperative, in no acute distress   Head:    Normocephalic, without obvious abnormality, atraumatic   Lungs:     Clear to auscultation,respirations regular, even and           unlabored    Heart:    Regular rhythm and normal rate, normal S1 and S2, no      murmur   Breast Exam:    Deferred   Abdomen:     Normal bowel sounds, no masses, no organomegaly, soft    non-tender, non-distended, no guarding, incision x 3 healing well with no  sign of infection   Genitalia:    Deferred   Extremities:   Moves all extremities well, no edema, no cyanosis, no         redness, no pain, neg Lauryn's   Pulses:   Pulses palpable and equal bilaterally   Skin:   No bleeding, bruising or rash   Lymph nodes:   No palpable adenopathy   Neurologic:   Cranial nerves 2 - 12 grossly intact, sensation intact        Discharge Disposition  Home or Self Care    Discharge Medications     Discharge Medications        New Medications        Instructions Start Date   docusate sodium 100 MG capsule  Commonly known as: COLACE   100 mg, Oral, 2 Times Daily PRN      ibuprofen 800 MG tablet  Commonly known as: ADVIL,MOTRIN   Take 1 tablet by mouth Every 8 (Eight) Hours As Needed for Moderate Pain      ondansetron 4 MG tablet  Commonly known as: ZOFRAN   4 mg, Oral, Every 6 Hours PRN      oxyCODONE-acetaminophen 5-325 MG per tablet  Commonly known as: PERCOCET   Take 1 tablet by mouth Every 4 (Four) Hours As Needed for Moderate Pain      simethicone 80 MG chewable tablet  Commonly known as: MYLICON   80 mg, Oral, 4 Times Daily PRN             Continue These Medications        Instructions Start Date   AeroChamber MV inhaler   Use with inhaler      albuterol sulfate  (90 Base) MCG/ACT inhaler  Commonly known as: PROVENTIL HFA;VENTOLIN HFA;PROAIR HFA   2 puffs, Inhalation, Every 4 Hours PRN, Use dos      pantoprazole 40 MG EC tablet  Commonly known as: PROTONIX   40 mg, Oral, 2 Times Daily Before Meals               Discharge Diet:   Diet Instructions       Diet: Regular/House Diet      Discharge Diet: Regular/House Diet    Texture: Regular Texture (IDDSI 7)    Fluid Consistency: Thin (IDDSI 0)            Activity at Discharge:   Activity Instructions       Driving Restrictions      No driving x 2 weeks or while taking narcotics    Type of Restriction: Driving    Driving Restrictions: No Driving While Taking Narcotics    Lifting Restrictions      Type of Restriction: Lifting     Lifting Restrictions: Lifting Restriction (Indicate Limit)    Weight Limit (Pounds): 5    Length of Lifting Restriction: Do not lift more than 5-10 #    Pelvic Rest      Nothing in the vagina x 6 weeks    Sexual Activity Restrictions      Type of Restriction: Sex    Explain Sexual Activity Restrictions: Nothing in the vagina x 6 weeks            Follow-up Appointments  Future Appointments   Date Time Provider Department Center   8/30/2023 10:00 AM Anni Perdomo APRN [facility]   8/31/2023  9:00 AM Sarah Berger MD [facility]     Additional Instructions for the Follow-ups that You Need to Schedule       Call MD With Problems / Concerns   As directed      Instructions: Call for temp>101, heavy vaginal bleeding, increasing lower abdominal pain or any additional concerns    Order Comments: Instructions: Call for temp>101, heavy vaginal bleeding, increasing lower abdominal pain or any additional concerns         Discharge Follow-up with Specialty: [facility]; 2 Weeks   As directed      Specialty: [facility]   Follow Up: 2 Weeks                Test Results Pending at Discharge  Pending Labs       Order Current Status    Tissue Pathology Exam In process             JAS Calderon  08/16/23  10:19 EDT    Time: Discharge 20 min

## 2023-08-16 NOTE — CASE MANAGEMENT/SOCIAL WORK
Discharge Planning Assessment  [facility]     Patient Name: Marilee Kraus  MRN: [id number]  Today's Date: 8/16/2023    Admit Date: 8/15/2023    Plan: Home with cris   Discharge Needs Assessment       Row Name 08/16/23 0930       Living Environment    People in Home significant other    Name(s) of People in Home cris Vizcarra    Current Living Arrangements home    Potentially Unsafe Housing Conditions none    Primary Care Provided by self    Provides Primary Care For no one    Caregiving Concerns no caregiving concerns voiced by patient at this time.    Family Caregiver if Needed significant other    Family Caregiver Names cris Wagner    Quality of Family Relationships helpful;involved;supportive    Able to Return to Prior Arrangements yes    Living Arrangement Comments Patient states he lives with her cris win a single story house with no steps to gain entry       Resource/Environmental Concerns    Resource/Environmental Concerns none    Transportation Concerns none       Food Insecurity    Within the past 12 months, you worried that your food would run out before you got the money to buy more. Never true    Within the past 12 months, the food you bought just didn't last and you didn't have money to get more. Never true       Transition Planning    Patient/Family Anticipates Transition to home with family    Patient/Family Anticipated Services at Transition none    Transportation Anticipated family or friend will provide  pt states her cris will be able to provide ride home at discharge       Discharge Needs Assessment    Readmission Within the Last 30 Days no previous admission in last 30 days    Current Outpatient/Agency/Support Group --  none    Equipment Currently Used at Home none    Concerns to be Addressed denies needs/concerns at this time;adjustment to diagnosis/illness;discharge planning    Concerns Comments pt voiced no discharge needs at this time.    Anticipated Changes  Related to Illness none    Equipment Needed After Discharge none    Outpatient/Agency/Support Group Needs --  pt declined needing these service at discharge    Discharge Facility/Level of Care Needs --  pt declines needing these services at this time.    Provided Post Acute Provider List? Refused    Refused Provider List Comment pt declined    Patient's Choice of Community Agency(s) none    Current Discharge Risk --  none    Discharge Coordination/Progress Patient states she plans on returning home at discharge with her cris to help as needed and voiced no discharge needs at this time.                   Discharge Plan       Row Name 08/16/23 0961       Plan    Plan Home with fivalarie    Patient/Family in Agreement with Plan yes    Plan Comments 0930, into room and introduced self and role of CM. Patient is able to speak English and declined the need for use of . Discussed discharge disposition with patient and her fivalarie Wagner with permission. Patient confirms that the info on her face sheet is correct. She states she wants to use med's to beds here at hospital for her scripts at discharge but normally uses [facility] in [place] and has no problem picking up or paying for her med's. After explaining a living will patient states she does not have one and declines information regarding one. Patient states she lives with  her fivalarie in a single story house with no steps to gain entry and normally has no issues entering the home or maneuvering inside. She states she is independent with her ADL's, works and drives and her fiance will be able to provide ride at discharge. She states she does not use any DME at home and does not anticipate needing any equipment at discharge. Patient states she has not used home health in the past and states she will not need this service at discharge and declines the need for other services such as STR or LTC at this time. Patient states she is being discharged today  and plans on returning home with her fiance to help as needed and voiced no discharge needs at this time. She had no other questions or concerns regarding discharge plans. Name and number placed on white board in room. CM will follow.                  Continued Care and Services - Admitted Since 8/15/2023    Coordination has not been started for this encounter.       Expected Discharge Date and Time       Expected Discharge Date Expected Discharge Time    Aug 16, 2023            Demographic Summary       Row Name 08/16/23 0934       General Information    Preferred Language [place]    General Information Comments pt declined needing       Row Name 08/16/23 0930       General Information    Admission Type observation    Arrived From home    Required Notices Provided Observation Status Notice    Referral Source admission list    Reason for Consult discharge planning    Preferred Language English       Contact Information    Permission Granted to Share Info With                    Functional Status    No documentation.                  Psychosocial    No documentation.                  Abuse/Neglect    No documentation.                  Legal    No documentation.                  Substance Abuse    No documentation.                  Patient Forms    No documentation.                     Sofia Harris RN

## 2023-08-16 NOTE — CASE MANAGEMENT/SOCIAL WORK
Case Management Discharge Note      Final Note: Discharged home.    Provided Post Acute Provider List?: Refused  Refused Provider List Comment: pt declined    Selected Continued Care - Discharged on 8/16/2023 Admission date: 8/15/2023 - Discharge disposition: Home or Self Care      Destination    No services have been selected for the patient.                Durable Medical Equipment    No services have been selected for the patient.                Dialysis/Infusion    No services have been selected for the patient.                Home Medical Care    No services have been selected for the patient.                Therapy    No services have been selected for the patient.                Community Resources    No services have been selected for the patient.                Community & DME    No services have been selected for the patient.                         Final Discharge Disposition Code: 01 - home or self-care

## 2023-08-16 NOTE — PLAN OF CARE
Goal Outcome Evaluation:  Plan of Care Reviewed With: patient        Progress: improving  Outcome Evaluation: VS stable, on RA. PRN oral pain meds given x1 dose, effective.  POD #1 dsg's clean/dry/intact, F/C DC'd per orders.

## 2023-08-17 ENCOUNTER — READMISSION MANAGEMENT (OUTPATIENT)
Dept: CALL CENTER | Facility: HOSPITAL | Age: 43
End: 2023-08-17
Payer: COMMERCIAL

## 2023-08-17 NOTE — OUTREACH NOTE
Prep Survey      Flowsheet Row Responses   Denominational facility patient discharged from? [facility]   Is LACE score < 7 ? Yes   Eligibility Readm Mgmt   Discharge diagnosis hysterectomy   Does the patient have one of the following disease processes/diagnoses(primary or secondary)? Other   Does the patient have Home health ordered? No   Is there a DME ordered? No   Comments regarding appointments listed on AVS   General alerts for this patient may need interpretur   Prep survey completed? Yes            Marisol IRVING - Registered Nurse

## 2023-08-18 ENCOUNTER — TELEPHONE (OUTPATIENT)
Dept: OBSTETRICS AND GYNECOLOGY | Facility: CLINIC | Age: 43
End: 2023-08-18

## 2023-08-18 ENCOUNTER — READMISSION MANAGEMENT (OUTPATIENT)
Dept: CALL CENTER | Facility: HOSPITAL | Age: 43
End: 2023-08-18
Payer: COMMERCIAL

## 2023-08-18 NOTE — OUTREACH NOTE
LAG < 7 Survey      Flowsheet Row Responses   [facility] patient discharged from? [place]   Does the patient have one of the following disease processes/diagnoses(primary or secondary)? Other   BHLAG <7 Attempt successful? Yes  [[employer], Niranjan Martin, #[id number]]   Call start time 1007   Call end time 1019   Discharge diagnosis hysterectomy   Meds reviewed with patient/caregiver? Yes   Is the patient having any side effects they believe may be caused by any medication additions or changes? No   Does the patient have all medications ordered at discharge? Yes   Is the patient taking all medications as directed (includes completed medication regime)? Yes   Does the patient have a primary care provider?  Yes   Does the patient have an appointment with their PCP within 7 days of discharge? N/A  [will f/u with surgeon]   Has the patient kept scheduled appointments due by today? N/A   Comments advised pt to f/u with PCP, pt agreed   Has home health visited the patient within 72 hours of discharge? N/A   Psychosocial issues? No   Did the patient receive a copy of their discharge instructions? Yes   Nursing interventions Reviewed instructions with patient   What is the patient's perception of their health status since discharge? New symptoms unrelated to diagnosis   Is the patient/caregiver able to teach back signs and symptoms related to disease process for when to call PCP? Yes   Is the patient/caregiver able to teach back signs and symptoms related to disease process for when to call 911? Yes   Is the patient/caregiver able to teach back the hierarchy of who to call/visit for symptoms/problems? PCP, Specialist, Home health nurse, Urgent Care, ED, 911 Yes   Additional teach back comments states having spinal related H/A's, advised pt to contact surgeon today, agreed to plan   Graduated Yes   Is the patient interested in additional calls from an ambulatory ? No            Blanca S - Registered  Nurse

## 2023-08-18 NOTE — TELEPHONE ENCOUNTER
HUB UNABLE TO WT    PT HAD PROCEDURE ON 8/15/23 AND IS SCHEDULED TO BE SEEN 8/31/23 AND STATES SHE IS HAVING PAINFUL HEADACHES. PT STATES SHE REACHED OUT TO HER PCP AND THEY INFORMED PT TO REACH OBGYN. PT IS REQUESTING TO BE SEEN FOR A SOONER APPT, PLEASE ADVISE PT.

## 2023-08-21 ENCOUNTER — OFFICE VISIT (OUTPATIENT)
Dept: OBSTETRICS AND GYNECOLOGY | Facility: CLINIC | Age: 43
End: 2023-08-21
Payer: COMMERCIAL

## 2023-08-21 VITALS
HEIGHT: 64 IN | WEIGHT: 162 LBS | BODY MASS INDEX: 27.66 KG/M2 | DIASTOLIC BLOOD PRESSURE: 70 MMHG | SYSTOLIC BLOOD PRESSURE: 112 MMHG

## 2023-08-21 DIAGNOSIS — Z09 POSTOPERATIVE FOLLOW-UP: Primary | ICD-10-CM

## 2023-08-21 DIAGNOSIS — G89.18 POSTOPERATIVE PAIN: ICD-10-CM

## 2023-08-21 DIAGNOSIS — R11.2 NAUSEA AND VOMITING, UNSPECIFIED VOMITING TYPE: ICD-10-CM

## 2023-08-21 LAB
ALBUMIN SERPL-MCNC: 4.7 G/DL (ref 3.5–5.2)
ALBUMIN/GLOB SERPL: 1.6 G/DL
ALP SERPL-CCNC: 101 U/L (ref 39–117)
ALT SERPL-CCNC: 22 U/L (ref 1–33)
AST SERPL-CCNC: 18 U/L (ref 1–32)
BASOPHILS # BLD AUTO: 0.05 10*3/MM3 (ref 0–0.2)
BASOPHILS NFR BLD AUTO: 0.6 % (ref 0–1.5)
BILIRUB SERPL-MCNC: 0.4 MG/DL (ref 0–1.2)
BUN SERPL-MCNC: 15 MG/DL (ref 6–20)
BUN/CREAT SERPL: 17.4 (ref 7–25)
CALCIUM SERPL-MCNC: 10.2 MG/DL (ref 8.6–10.5)
CHLORIDE SERPL-SCNC: 101 MMOL/L (ref 98–107)
CO2 SERPL-SCNC: 30.2 MMOL/L (ref 22–29)
CREAT SERPL-MCNC: 0.86 MG/DL (ref 0.57–1)
EGFRCR SERPLBLD CKD-EPI 2021: 86.6 ML/MIN/1.73
EOSINOPHIL # BLD AUTO: 0.27 10*3/MM3 (ref 0–0.4)
EOSINOPHIL NFR BLD AUTO: 3.4 % (ref 0.3–6.2)
ERYTHROCYTE [DISTWIDTH] IN BLOOD BY AUTOMATED COUNT: 11.7 % (ref 12.3–15.4)
GLOBULIN SER CALC-MCNC: 3 GM/DL
GLUCOSE SERPL-MCNC: 126 MG/DL (ref 65–99)
HCT VFR BLD AUTO: 43.8 % (ref 34–46.6)
HGB BLD-MCNC: 14.5 G/DL (ref 12–15.9)
IMM GRANULOCYTES # BLD AUTO: 0.02 10*3/MM3 (ref 0–0.05)
IMM GRANULOCYTES NFR BLD AUTO: 0.3 % (ref 0–0.5)
LYMPHOCYTES # BLD AUTO: 1.62 10*3/MM3 (ref 0.7–3.1)
LYMPHOCYTES NFR BLD AUTO: 20.5 % (ref 19.6–45.3)
MCH RBC QN AUTO: 30.3 PG (ref 26.6–33)
MCHC RBC AUTO-ENTMCNC: 33.1 G/DL (ref 31.5–35.7)
MCV RBC AUTO: 91.6 FL (ref 79–97)
MONOCYTES # BLD AUTO: 0.5 10*3/MM3 (ref 0.1–0.9)
MONOCYTES NFR BLD AUTO: 6.3 % (ref 5–12)
NEUTROPHILS # BLD AUTO: 5.43 10*3/MM3 (ref 1.7–7)
NEUTROPHILS NFR BLD AUTO: 68.9 % (ref 42.7–76)
NRBC BLD AUTO-RTO: 0 /100 WBC (ref 0–0.2)
PLATELET # BLD AUTO: 350 10*3/MM3 (ref 140–450)
POTASSIUM SERPL-SCNC: 4 MMOL/L (ref 3.5–5.2)
PROT SERPL-MCNC: 7.7 G/DL (ref 6–8.5)
RBC # BLD AUTO: 4.78 10*6/MM3 (ref 3.77–5.28)
SODIUM SERPL-SCNC: 141 MMOL/L (ref 136–145)
WBC # BLD AUTO: 7.89 10*3/MM3 (ref 3.4–10.8)

## 2023-08-21 PROCEDURE — 99024 POSTOP FOLLOW-UP VISIT: CPT | Performed by: OBSTETRICS & GYNECOLOGY

## 2023-08-21 RX ORDER — PROMETHAZINE HYDROCHLORIDE 12.5 MG/1
12.5 TABLET ORAL EVERY 6 HOURS PRN
Qty: 20 TABLET | Refills: 0 | Status: SHIPPED | OUTPATIENT
Start: 2023-08-21

## 2023-08-21 RX ORDER — HYDROCODONE BITARTRATE AND ACETAMINOPHEN 5; 325 MG/1; MG/1
1 TABLET ORAL EVERY 6 HOURS PRN
Qty: 20 TABLET | Refills: 0 | Status: SHIPPED | OUTPATIENT
Start: 2023-08-21

## 2023-08-21 RX ORDER — ONDANSETRON 4 MG/1
4 TABLET, ORALLY DISINTEGRATING ORAL EVERY 8 HOURS PRN
Qty: 20 TABLET | Refills: 0 | Status: SHIPPED | OUTPATIENT
Start: 2023-08-21

## 2023-08-21 NOTE — PROGRESS NOTES
"Surgical follow up visit     Marilee Kraus is a 43 y.o. female who presents to the clinic 1 weeks status post TLH with BS  without Oophorectomy for cervical dysplasia. She had a severe HA Friday and Saturday and would take her pain medication and get very nauseated and then vomit. She also had constipation. Her incisions and \"everything from the surgery\" feels fine. She was able to have a BM yesterday and her HA and nausea improved. We discussed that she may need to change pain meds as the Percocet seems to be a nausea trigger for her.  She denies back pain, fevers, chills, dysuria, pelvic pain or VB.  I am going to check labs and would like to see her back this week to make sure she is feeling better week by week. Her path showed no residual disease.     The following portions of the patient's history were reviewed and updated as appropriate: allergies, current medications, past family history, past medical history, past social history, past surgical history, and problem list.    Review of Systems  Review of Systems   Constitutional:  Positive for activity change. Negative for chills, fatigue and fever.   Gastrointestinal:  Positive for constipation (improving) and nausea (improving). Negative for abdominal distention and abdominal pain.   Genitourinary:  Negative for flank pain, pelvic pain, vaginal bleeding, vaginal discharge and vaginal pain.   Musculoskeletal:  Negative for back pain.   Neurological:  Positive for headaches.      Objective    /70   Ht 162.6 cm (64\")   Wt 73.5 kg (162 lb)   LMP 07/19/2023 (Exact Date)   BMI 27.81 kg/mý     Physical Exam  Vitals and nursing note reviewed.   Constitutional:       Appearance: Normal appearance. She is well-developed and normal weight.   HENT:      Head: Normocephalic and atraumatic.   Eyes:      General: No scleral icterus.     Conjunctiva/sclera: Conjunctivae normal.   Neck:      Thyroid: No thyromegaly.   Abdominal:      General: Bowel sounds are " normal. There is no distension.      Palpations: Abdomen is soft. There is no mass.      Tenderness: There is no abdominal tenderness. There is no guarding or rebound.      Hernia: No hernia is present.      Comments: Inc with minimal ecchymosis noted  No erythema   Skin:     General: Skin is warm and dry.   Neurological:      Mental Status: She is alert and oriented to person, place, and time.   Psychiatric:         Mood and Affect: Mood normal.         Behavior: Behavior normal.         Thought Content: Thought content normal.         Judgment: Judgment normal.       Assessment     1) Post op TLH with BS  without Oophorectomy  Improving postoperatively as of yesterday. . Postoperative course complicated by HA and nausea.  ERX Lortab 5/325 mg and stop Percocet. ERX for phenergan and Zofran OTD sent in. No evidence of bowel obstruction. Pt says HA and medication are what is causing nausea. Check CBC and CMP.   2) Operative findings again reviewed. Pathology report discussed.  Intraoperative photos were not reviewed.      Plan    1. Continue any current medications.  2. Wound care discussed.  3. Activity restrictions: nothing in the vagina ( no tampons, douching or sex), no heavy lifting, pushing or pulling, no driving, no swimming, no use of hot tubs, no tub baths, and no lifting more than 15 pounds  4. Anticipated return to work: 4 weeks.  5. Follow up: 3 day(s) for recheck of symptoms. .     Sarah Berger MD     08/21/2023     12:25 EDT

## 2023-08-24 ENCOUNTER — OFFICE VISIT (OUTPATIENT)
Dept: OBSTETRICS AND GYNECOLOGY | Facility: CLINIC | Age: 43
End: 2023-08-24
Payer: COMMERCIAL

## 2023-08-24 VITALS
DIASTOLIC BLOOD PRESSURE: 88 MMHG | SYSTOLIC BLOOD PRESSURE: 114 MMHG | WEIGHT: 168 LBS | HEIGHT: 64 IN | BODY MASS INDEX: 28.68 KG/M2

## 2023-08-24 DIAGNOSIS — Z09 POSTOPERATIVE FOLLOW-UP: Primary | ICD-10-CM

## 2023-08-24 LAB
BILIRUB BLD-MCNC: NEGATIVE MG/DL
CLARITY, POC: CLEAR
COLOR UR: YELLOW
GLUCOSE UR STRIP-MCNC: NEGATIVE MG/DL
KETONES UR QL: NEGATIVE
LEUKOCYTE EST, POC: NEGATIVE
NITRITE UR-MCNC: NEGATIVE MG/ML
PH UR: 5 [PH] (ref 5–8)
PROT UR STRIP-MCNC: NEGATIVE MG/DL
RBC # UR STRIP: ABNORMAL /UL
SP GR UR: 1 (ref 1–1.03)
UROBILINOGEN UR QL: NORMAL

## 2023-08-24 NOTE — PROGRESS NOTES
"Surgical follow up visit     Marilee Kraus is a 43 y.o. female who presents to the clinic 2 weeks status post TLH with BS  without Oophorectomy for cervical dysplasia Eating a regular diet without difficulty. Bowel movements are normal. Pain is controlled without any medications..  Vaginal bleeding is none. She feels MUCH better. No more nausea and her pain is mild. Bowel function is now normal.     The following portions of the patient's history were reviewed and updated as appropriate: allergies, current medications, past family history, past medical history, past social history, past surgical history, and problem list.    Review of Systems  Review of Systems   Constitutional:  Positive for activity change and fatigue.   Gastrointestinal:  Negative for abdominal pain, constipation, diarrhea, nausea and vomiting.   Genitourinary:  Negative for flank pain, hematuria, pelvic pain, vaginal discharge and vaginal pain.   Musculoskeletal:  Negative for back pain.      Objective    /88   Ht 162.6 cm (64.02\")   Wt 76.2 kg (168 lb)   LMP 07/19/2023 (Exact Date)   BMI 28.82 kg/mý     Physical Exam  Vitals and nursing note reviewed.   Constitutional:       Appearance: Normal appearance. She is well-developed.   HENT:      Head: Normocephalic and atraumatic.   Eyes:      General: No scleral icterus.     Conjunctiva/sclera: Conjunctivae normal.   Neck:      Thyroid: No thyromegaly.   Abdominal:      General: Bowel sounds are normal. There is no distension.      Palpations: Abdomen is soft. There is no mass.      Tenderness: There is no abdominal tenderness. There is no guarding or rebound.      Hernia: No hernia is present.      Comments: Inc C/D/I, no erythema   Skin:     General: Skin is warm and dry.   Neurological:      Mental Status: She is alert and oriented to person, place, and time.   Psychiatric:         Mood and Affect: Mood normal.         Behavior: Behavior normal.         Thought Content: Thought " content normal.         Judgment: Judgment normal.       Assessment     1) Post op TLH with BS  without Oophorectomy  Doing well postoperatively.  2) Operative findings again reviewed. Pathology report discussed.  Intraoperative photos were not reviewed.      Plan    1. Continue any current medications.  2. Wound care discussed.  3. Activity restrictions: nothing in the vagina ( no tampons, douching or sex), no heavy lifting, pushing or pulling, no swimming, no use of hot tubs, no tub baths, and no lifting more than 15 pounds  4. Anticipated return to work: 4 weeks.  5. Follow up:  2-3  week(s) for postop check.     Sarah Berger MD     08/24/2023     13:00 EDT

## 2023-09-09 ENCOUNTER — HOSPITAL ENCOUNTER (EMERGENCY)
Facility: HOSPITAL | Age: 43
Discharge: HOME | End: 2023-09-09
Attending: STUDENT IN AN ORGANIZED HEALTH CARE EDUCATION/TRAINING PROGRAM
Payer: COMMERCIAL

## 2023-09-09 ENCOUNTER — APPOINTMENT (OUTPATIENT)
Dept: CT IMAGING | Facility: HOSPITAL | Age: 43
End: 2023-09-09
Payer: COMMERCIAL

## 2023-09-09 VITALS
RESPIRATION RATE: 16 BRPM | DIASTOLIC BLOOD PRESSURE: 68 MMHG | BODY MASS INDEX: 26.52 KG/M2 | TEMPERATURE: 98.6 F | WEIGHT: 165 LBS | HEIGHT: 66 IN | OXYGEN SATURATION: 96 % | HEART RATE: 94 BPM | SYSTOLIC BLOOD PRESSURE: 99 MMHG

## 2023-09-09 DIAGNOSIS — R10.30 LOWER ABDOMINAL PAIN: Primary | ICD-10-CM

## 2023-09-09 LAB
ALBUMIN SERPL-MCNC: 3.8 G/DL (ref 3.5–5.2)
ALBUMIN/GLOB SERPL: 1.1 G/DL
ALP SERPL-CCNC: 94 U/L (ref 39–117)
ALT SERPL W P-5'-P-CCNC: 12 U/L (ref 1–33)
ANION GAP SERPL CALCULATED.3IONS-SCNC: 10 MMOL/L (ref 5–15)
AST SERPL-CCNC: 20 U/L (ref 1–32)
BACTERIA UR QL AUTO: ABNORMAL /HPF
BASOPHILS # BLD AUTO: 0.04 10*3/MM3 (ref 0–0.2)
BASOPHILS NFR BLD AUTO: 0.4 % (ref 0–1.5)
BILIRUB SERPL-MCNC: 0.2 MG/DL (ref 0–1.2)
BILIRUB UR QL STRIP: NEGATIVE
BUN SERPL-MCNC: 10 MG/DL (ref 6–20)
BUN/CREAT SERPL: 17.5 (ref 7–25)
CALCIUM SPEC-SCNC: 8.1 MG/DL (ref 8.6–10.5)
CHLORIDE SERPL-SCNC: 103 MMOL/L (ref 98–107)
CLARITY UR: ABNORMAL
CO2 SERPL-SCNC: 22 MMOL/L (ref 22–29)
COLOR UR: YELLOW
CREAT SERPL-MCNC: 0.57 MG/DL (ref 0.57–1)
DEPRECATED RDW RBC AUTO: 39.9 FL (ref 37–54)
EGFRCR SERPLBLD CKD-EPI 2021: 116.5 ML/MIN/1.73
EOSINOPHIL # BLD AUTO: 0.1 10*3/MM3 (ref 0–0.4)
EOSINOPHIL NFR BLD AUTO: 1 % (ref 0.3–6.2)
ERYTHROCYTE [DISTWIDTH] IN BLOOD BY AUTOMATED COUNT: 12 % (ref 12.3–15.4)
GLOBULIN UR ELPH-MCNC: 3.6 GM/DL
GLUCOSE SERPL-MCNC: 114 MG/DL (ref 65–99)
GLUCOSE UR STRIP-MCNC: NEGATIVE MG/DL
GRAN CASTS URNS QL MICRO: ABNORMAL /LPF
HCT VFR BLD AUTO: 37.2 % (ref 34–46.6)
HGB BLD-MCNC: 12.2 G/DL (ref 12–15.9)
HGB UR QL STRIP.AUTO: ABNORMAL
HYALINE CASTS UR QL AUTO: ABNORMAL /LPF
IMM GRANULOCYTES # BLD AUTO: 0.02 10*3/MM3 (ref 0–0.05)
IMM GRANULOCYTES NFR BLD AUTO: 0.2 % (ref 0–0.5)
KETONES UR QL STRIP: NEGATIVE
LEUKOCYTE ESTERASE UR QL STRIP.AUTO: ABNORMAL
LYMPHOCYTES # BLD AUTO: 2.63 10*3/MM3 (ref 0.7–3.1)
LYMPHOCYTES NFR BLD AUTO: 25.9 % (ref 19.6–45.3)
MCH RBC QN AUTO: 29.8 PG (ref 26.6–33)
MCHC RBC AUTO-ENTMCNC: 32.8 G/DL (ref 31.5–35.7)
MCV RBC AUTO: 90.7 FL (ref 79–97)
MONOCYTES # BLD AUTO: 0.86 10*3/MM3 (ref 0.1–0.9)
MONOCYTES NFR BLD AUTO: 8.5 % (ref 5–12)
MUCOUS THREADS URNS QL MICRO: ABNORMAL /HPF
NEUTROPHILS NFR BLD AUTO: 6.5 10*3/MM3 (ref 1.7–7)
NEUTROPHILS NFR BLD AUTO: 64 % (ref 42.7–76)
NITRITE UR QL STRIP: NEGATIVE
NRBC BLD AUTO-RTO: 0 /100 WBC (ref 0–0.2)
PH UR STRIP.AUTO: <=5 [PH] (ref 5–9)
PLATELET # BLD AUTO: 328 10*3/MM3 (ref 140–450)
PMV BLD AUTO: 10.1 FL (ref 6–12)
POTASSIUM SERPL-SCNC: 3.5 MMOL/L (ref 3.5–5.2)
PROT SERPL-MCNC: 7.4 G/DL (ref 6–8.5)
PROT UR QL STRIP: ABNORMAL
RBC # BLD AUTO: 4.1 10*6/MM3 (ref 3.77–5.28)
RBC # UR STRIP: ABNORMAL /HPF
REF LAB TEST METHOD: ABNORMAL
SODIUM SERPL-SCNC: 135 MMOL/L (ref 136–145)
SP GR UR STRIP: 1.03 (ref 1–1.03)
SQUAMOUS #/AREA URNS HPF: ABNORMAL /HPF
UROBILINOGEN UR QL STRIP: ABNORMAL
WBC # UR STRIP: ABNORMAL /HPF
WBC NRBC COR # BLD: 10.15 10*3/MM3 (ref 3.4–10.8)

## 2023-09-09 PROCEDURE — 74177 CT ABD & PELVIS W/CONTRAST: CPT

## 2023-09-09 PROCEDURE — 81001 URINALYSIS AUTO W/SCOPE: CPT

## 2023-09-09 PROCEDURE — 36415 COLL VENOUS BLD VENIPUNCTURE: CPT

## 2023-09-09 PROCEDURE — 25510000001 IOPAMIDOL 61 % SOLUTION: Performed by: STUDENT IN AN ORGANIZED HEALTH CARE EDUCATION/TRAINING PROGRAM

## 2023-09-09 PROCEDURE — 80053 COMPREHEN METABOLIC PANEL: CPT | Performed by: STUDENT IN AN ORGANIZED HEALTH CARE EDUCATION/TRAINING PROGRAM

## 2023-09-09 PROCEDURE — 99285 EMERGENCY DEPT VISIT HI MDM: CPT

## 2023-09-09 PROCEDURE — 85025 COMPLETE CBC W/AUTO DIFF WBC: CPT | Performed by: STUDENT IN AN ORGANIZED HEALTH CARE EDUCATION/TRAINING PROGRAM

## 2023-09-09 RX ORDER — OXYCODONE AND ACETAMINOPHEN 7.5; 325 MG/1; MG/1
1 TABLET ORAL ONCE
Status: COMPLETED | OUTPATIENT
Start: 2023-09-09 | End: 2023-09-09

## 2023-09-09 RX ORDER — SODIUM CHLORIDE 0.9 % (FLUSH) 0.9 %
10 SYRINGE (ML) INJECTION AS NEEDED
Status: DISCONTINUED | OUTPATIENT
Start: 2023-09-09 | End: 2023-09-09 | Stop reason: HOSPADM

## 2023-09-09 RX ADMIN — IOPAMIDOL 90 ML: 612 INJECTION, SOLUTION INTRAVENOUS at 06:31

## 2023-09-09 RX ADMIN — OXYCODONE HYDROCHLORIDE AND ACETAMINOPHEN 1 TABLET: 7.5; 325 TABLET ORAL at 06:00
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
## 2023-09-14 ENCOUNTER — OFFICE VISIT (OUTPATIENT)
Dept: OBSTETRICS AND GYNECOLOGY | Facility: CLINIC | Age: 43
End: 2023-09-14
Payer: COMMERCIAL

## 2023-09-14 VITALS
HEIGHT: 66 IN | BODY MASS INDEX: 26.33 KG/M2 | DIASTOLIC BLOOD PRESSURE: 74 MMHG | SYSTOLIC BLOOD PRESSURE: 118 MMHG | WEIGHT: 163.8 LBS

## 2023-09-14 DIAGNOSIS — G89.18 POSTOPERATIVE PAIN: ICD-10-CM

## 2023-09-14 DIAGNOSIS — Z09 POSTOPERATIVE FOLLOW-UP: Primary | ICD-10-CM

## 2023-09-14 RX ORDER — HYDROCODONE BITARTRATE AND ACETAMINOPHEN 5; 325 MG/1; MG/1
1 TABLET ORAL EVERY 6 HOURS PRN
Qty: 20 TABLET | Refills: 0 | Status: SHIPPED | OUTPATIENT
Start: 2023-09-14
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
## 2023-10-16 ENCOUNTER — OFFICE VISIT (OUTPATIENT)
Dept: OBSTETRICS AND GYNECOLOGY | Facility: CLINIC | Age: 43
End: 2023-10-16
Payer: COMMERCIAL

## 2023-10-16 VITALS
HEIGHT: 66 IN | SYSTOLIC BLOOD PRESSURE: 110 MMHG | BODY MASS INDEX: 26.68 KG/M2 | WEIGHT: 166 LBS | DIASTOLIC BLOOD PRESSURE: 70 MMHG

## 2023-10-16 DIAGNOSIS — Z09 POSTOPERATIVE FOLLOW-UP: Primary | ICD-10-CM

## 2023-10-16 LAB
BILIRUB BLD-MCNC: NEGATIVE MG/DL
CLARITY, POC: CLEAR
COLOR UR: YELLOW
GLUCOSE UR STRIP-MCNC: NEGATIVE MG/DL
KETONES UR QL: NEGATIVE
LEUKOCYTE EST, POC: NEGATIVE
NITRITE UR-MCNC: NEGATIVE MG/ML
PH UR: 5 [PH] (ref 5–8)
PROT UR STRIP-MCNC: NEGATIVE MG/DL
RBC # UR STRIP: NEGATIVE /UL
SP GR UR: 1 (ref 1–1.03)
UROBILINOGEN UR QL: NORMAL

## 2023-10-16 NOTE — PROGRESS NOTES
"Surgical follow up visit     Marilee Kraus is a 43 y.o. female who presents to the clinic 8 weeks status post TLH with BS  without Oophorectomy for cervical dysplasia Eating a regular diet without difficulty. Bowel movements are normal. Pain is controlled without any medications..  Vaginal bleeding is none. She has occ pulling sensation on the R side with prolonged standing or walking. She does not need meds for this pain, it goes away with rest. She denies HF. She would like to RTW on 10/23/2023. She wants a lifting restriction of 15# for the first month because she is concerned about the heavy lifting she has to do at work.     The following portions of the patient's history were reviewed and updated as appropriate: allergies, current medications, past family history, past medical history, past social history, past surgical history, and problem list.    Review of Systems  Review of Systems   Constitutional:  Positive for activity change. Negative for appetite change, fatigue, fever and unexpected weight change.   Eyes:  Negative for photophobia and visual disturbance.   Respiratory:  Negative for cough and shortness of breath.    Cardiovascular:  Negative for chest pain and palpitations.   Gastrointestinal:  Negative for abdominal distention, abdominal pain, constipation, diarrhea and nausea.   Endocrine: Negative for cold intolerance and heat intolerance.   Genitourinary:  Positive for pelvic pain. Negative for dyspareunia, dysuria, menstrual problem, vaginal bleeding, vaginal discharge and vaginal pain.   Musculoskeletal:  Negative for back pain.   Skin:  Negative for color change and rash.   Neurological:  Negative for headaches.   Hematological:  Negative for adenopathy. Does not bruise/bleed easily.   Psychiatric/Behavioral:  Negative for dysphoric mood. The patient is not nervous/anxious.         Objective    /70   Ht 167.6 cm (65.98\")   Wt 75.3 kg (166 lb)   LMP 07/19/2023 (Exact Date)   " Breastfeeding No   BMI 26.81 kg/m²     Physical Exam  Vitals and nursing note reviewed. Exam conducted with a chaperone present.   Constitutional:       Appearance: Normal appearance. She is well-developed.   HENT:      Head: Normocephalic and atraumatic.   Eyes:      General: No scleral icterus.     Conjunctiva/sclera: Conjunctivae normal.   Neck:      Thyroid: No thyromegaly.   Abdominal:      General: There is no distension.      Palpations: Abdomen is soft. There is no mass.      Tenderness: There is no abdominal tenderness. There is no guarding or rebound.      Hernia: No hernia is present.      Comments: Inc well healed   Genitourinary:     General: Normal vulva.      Vagina: Normal.      Uterus: Absent.       Adnexa: Right adnexa normal and left adnexa normal.      Comments: Uterus and cervix absent  Normal cuff  Skin:     General: Skin is warm and dry.   Neurological:      Mental Status: She is alert and oriented to person, place, and time.   Psychiatric:         Mood and Affect: Mood normal.         Behavior: Behavior normal.         Thought Content: Thought content normal.         Judgment: Judgment normal.         Assessment     1) Post op TLH with BS  without Oophorectomy  Doing well postoperatively.  2) Operative findings again reviewed. Pathology report discussed.  Intraoperative photos were not reviewed.      Plan    1. Continue any current medications.  2. Wound care discussed.  3. Activity restrictions: no lifting more than 15 pounds x 4 weeks  4. Anticipated return to work:  10/23/2023 .  5. Follow up: 6 month(s) for repeat pap of cuff.     Sarah Berger MD     10/16/2023     14:46 EDT

## 2024-01-29 NOTE — OUTREACH NOTE
Extraction Method: extractor
Prep Survey      Responses   [facility] patient discharged from? [place]   Is LACE score < 7 ? No   Emergency Room discharge w/ pulse ox? No   Eligibility [facility]   Date of Admission 12/04/21   Date of Discharge 12/11/21   Discharge Disposition Home or Self Care   Discharge diagnosis Pneumonia due to COVID-19 virus   Does the patient have one of the following disease processes/diagnoses(primary or secondary)? COVID-19   Does the patient have Home health ordered? No   Is there a DME ordered? Yes   What DME was ordered? [facility] [employer]   Prep survey completed? Yes          Destiny Hayden RN        
Mask Type (Optional): gel based
Price (Use Numbers Only, No Special Characters Or $): 85
Detail Level: Zone

## 2024-03-12 ENCOUNTER — OFFICE VISIT (OUTPATIENT)
Dept: GASTROENTEROLOGY | Facility: CLINIC | Age: 44
End: 2024-03-12
Payer: COMMERCIAL

## 2024-03-12 VITALS
DIASTOLIC BLOOD PRESSURE: 68 MMHG | WEIGHT: 167.6 LBS | BODY MASS INDEX: 26.93 KG/M2 | HEIGHT: 66 IN | SYSTOLIC BLOOD PRESSURE: 112 MMHG

## 2024-03-12 DIAGNOSIS — K21.00 GASTROESOPHAGEAL REFLUX DISEASE WITH ESOPHAGITIS WITHOUT HEMORRHAGE: Primary | ICD-10-CM

## 2024-03-12 DIAGNOSIS — B37.81 CANDIDA INFECTION, ESOPHAGEAL: ICD-10-CM

## 2024-03-12 DIAGNOSIS — R10.13 DYSPEPSIA: ICD-10-CM

## 2024-03-12 DIAGNOSIS — B37.0 THRUSH: ICD-10-CM

## 2024-03-12 PROCEDURE — 99214 OFFICE O/P EST MOD 30 MIN: CPT

## 2024-03-12 RX ORDER — FLUCONAZOLE 200 MG/1
TABLET ORAL
Qty: 22 TABLET | Refills: 0 | Status: SHIPPED | OUTPATIENT
Start: 2024-03-12 | End: 2024-03-12

## 2024-03-12 RX ORDER — FLUCONAZOLE 200 MG/1
TABLET ORAL
Qty: 22 TABLET | Refills: 0 | Status: SHIPPED | OUTPATIENT
Start: 2024-03-12

## 2024-03-12 RX ORDER — PANTOPRAZOLE SODIUM 40 MG/1
40 TABLET, DELAYED RELEASE ORAL
Qty: 180 TABLET | Refills: 3 | Status: SHIPPED | OUTPATIENT
Start: 2024-03-12 | End: 2024-03-12

## 2024-03-12 RX ORDER — PANTOPRAZOLE SODIUM 40 MG/1
40 TABLET, DELAYED RELEASE ORAL
Qty: 180 TABLET | Refills: 3 | Status: SHIPPED | OUTPATIENT
Start: 2024-03-12

## 2024-03-12 NOTE — PROGRESS NOTES
PATIENT INFORMATION  Marilee Kraus       - 1980    CHIEF COMPLAINT  Chief Complaint   Patient presents with    Heartburn    Elevated Hepatic Enzymes       HISTORY OF PRESENT ILLNESS    Here for GERD follow-up     Professional  used for visit.     Per LOV: Reports she feeling much better than last visit and is now on once a day PPI for the past 2 weeks, no breakthrough symptoms. Reviewed that BID PPI has been required and concern that symptoms will return on the lower dose. No nausea, abdominal pains. NSAIDs PRN, 1-2 times a month maybe.      TODAY: Burning has been back in throat with regurgitation for the last few months. Taking pantoprazole twice most days, did not stop taking. Same feeling, but last year was in chest, now in throat and mouth. Has noticed some sores in mouth, burning sensation, different locations in mouth. Took steroids shortly before it began 1 month ago and bothers her every day. Had steroids a few months.     Stomach pains 3 times in the last month, ate late those days, so is trying to avoid.    2021 Last EGD with mild chronic gastritis, reflux no HP or barretts.    Heartburn  She complains of choking and a sore throat. She reports no abdominal pain or no nausea.       REVIEWED PERTINENT RESULTS/ LABS  Lab Results   Component Value Date    CASEREPORT  08/15/2023     Surgical Pathology Report                         Case: [id number]                                  Authorizing Provider:  Sarah Berger,  Collected:           08/15/2023 08:56 AM                                 MD                                                                           Ordering Location:     [facility]   Received:            08/15/2023 10:43 AM                                 OR                                                                           Pathologist:           Nathan Aguirre MD                                                          Specimens:   1) - Fallopian Tube, Left                                                                           2) - Fallopian Tube, Right                                                                          3) - Uterus with Cervix                                                                    FINALDX  08/15/2023     1. Left Fallopian Tube Segment, Elective Sterilization:   A. Complete cross section of fimbriated fallopian tube segment and paratubal cysts.    2. Right Fallopian Tube Segment, Elective Sterilization:   A. Complete cross section of fimbriated fallopian tube segment and paratubal cysts.    3. Uterus with Cervix:   A. Cervix (coned):    1. Focal koilocytic atypia and immature squamous metaplasia present near the                                   transformation zone.    2. No definitive dysplasia nor invasive carcinoma seen.    3. Microglandular adenosis and Nabothian cysts noted.    4. Ectocervical margin is negative for dysplasia and/or malignancy.   B. Endometrium:    1. Secretory endometrium.    2. No atypical endometrial hyperplasia nor malignancy identified.   C. Myometrium:    1. Small leiomyoma.    2. No malignancy identified.   D. Serosa:    1. No endometriosis nor malignancy identified.    jat/amparoe        Lab Results   Component Value Date    HGB 12.2 09/09/2023    MCV 90.7 09/09/2023     09/09/2023    ALT 12 09/09/2023    AST 20 09/09/2023    FERRITIN 229.00 (H) 12/11/2021    IRON 81 04/25/2022    TIBC 422 04/25/2022      No results found.    REVIEW OF SYSTEMS  Review of Systems   Constitutional: Negative.    HENT:  Positive for sore throat and trouble swallowing (Food getting stuck).    Eyes: Negative.    Respiratory:  Positive for choking.    Cardiovascular: Negative.    Gastrointestinal:  Negative for abdominal pain, constipation, diarrhea, nausea and vomiting.        Elevated LFTs, GERD   Endocrine: Negative.    Genitourinary: Negative.    Musculoskeletal: Negative.     Skin: Negative.    Allergic/Immunologic: Negative.    Neurological: Negative.    Hematological: Negative.    Psychiatric/Behavioral: Negative.           ACTIVE PROBLEMS  Patient Active Problem List    Diagnosis     S/P conization of cervix [Z98.890]     Chronic idiopathic constipation [K59.04]     Helicobacter pylori gastritis [K29.70, B96.81]     Cytokine release syndrome, grade 1 [D89.831]     Pneumonia due to COVID-19 virus [U07.1, J12.82]     Gastroesophageal reflux disease with esophagitis without hemorrhage [K21.00]     Epigastric pain [R10.13]     S/P LEEP [Z98.890]     Dysplasia of cervix, high grade RUMA 2 [N87.1]     Breast pain [N64.4]          PAST MEDICAL HISTORY  Past Medical History:   Diagnosis Date    Abnormal Pap smear of cervix 2018    ASCUS + HPV =- no follow up    Asthma     Cervical dysplasia     RUMA 2 on bx, neg LEEP, RUMA 3 on CKC, neg margins    GERD (gastroesophageal reflux disease)     S/P LEEP 2020         SURGICAL HISTORY  Past Surgical History:   Procedure Laterality Date    CERVICAL BIOPSY  W/ LOOP ELECTRODE EXCISION      CERVICAL CONIZATION N/A 2022    Procedure: CERVICAL CONIZATION;  Surgeon: Sarah Berger MD;  Location: [facility];  Service: Obstetrics/Gynecology;  Laterality: N/A;     SECTION      placenta previa     SECTION       SECTION      ENDOSCOPY N/A 2021    Procedure: ESOPHAGOGASTRODUODENOSCOPY with biopsies;  Surgeon: Lee Tubbs MD;  Location: [facility] OR;  Service: Gastroenterology;  Laterality: N/A;  Reflux  Biopsies: gastric, distal esophagus    LEEP N/A 2020    Procedure: LOOP ELECTROCAUTERY EXCISION PROCEDURE;  Surgeon: Sarah Berger MD;  Location: [facility] OR;  Service: Obstetrics/Gynecology;  Laterality: N/A;    TOTAL LAPAROSCOPIC HYSTERECTOMY SALPINGECTOMY N/A 8/15/2023    Procedure: TOTAL LAPAROSCOPIC HYSTERECTOMY , bilateral salpingectomy, lysis of adhesions;  Surgeon:  "Sarah Berger MD;  Location: [facility];  Service: Obstetrics/Gynecology;  Laterality: N/A;         FAMILY HISTORY  Family History   Problem Relation Age of Onset    Breast cancer Neg Hx     Ovarian cancer Neg Hx     Uterine cancer Neg Hx     Colon cancer Neg Hx     Prostate cancer Neg Hx     Deep vein thrombosis Neg Hx     Malig Hyperthermia Neg Hx          SOCIAL HISTORY  Social History     Occupational History    Occupation: unemployed   Tobacco Use    Smoking status: Never    Smokeless tobacco: Never   Vaping Use    Vaping status: Never Used   Substance and Sexual Activity    Alcohol use: Never    Drug use: Never    Sexual activity: Not Currently     Partners: Male     Birth control/protection: Hysterectomy     Comment: 8/2023- TLH/BS with OC         CURRENT MEDICATIONS    Current Outpatient Medications:     HYDROcodone-acetaminophen (NORCO) 5-325 MG per tablet, Take 1 tablet by mouth Every 6 (Six) Hours As Needed for Severe Pain., Disp: 20 tablet, Rfl: 0    ibuprofen (ADVIL,MOTRIN) 800 MG tablet, Take 1 tablet by mouth Every 8 (Eight) Hours As Needed for Moderate Pain, Disp: 30 tablet, Rfl: 0    pantoprazole (PROTONIX) 40 MG EC tablet, Take 1 tablet by mouth 2 (Two) Times a Day Before Meals., Disp: 180 tablet, Rfl: 3    fluconazole (Diflucan) 200 MG tablet, Take 2 tablets by mouth on day one, then take 1 tablet by mouth daily, Disp: 22 tablet, Rfl: 0    ALLERGIES  Amoxapine and Amoxicillin    VITALS  Vitals:    03/12/24 1427   BP: 112/68   BP Location: Left arm   Patient Position: Sitting   Cuff Size: Adult   Weight: 76 kg (167 lb 9.6 oz)   Height: 167.6 cm (65.98\")       PHYSICAL EXAM  Debilities/Disabilities Identified: None  Emotional Behavior: Appropriate  Wt Readings from Last 3 Encounters:   03/12/24 76 kg (167 lb 9.6 oz)   10/16/23 75.3 kg (166 lb)   09/14/23 74.3 kg (163 lb 12.8 oz)     Ht Readings from Last 1 Encounters:   03/12/24 167.6 cm (65.98\")     Body mass index is 27.06 " kg/m².  Physical Exam  Constitutional:       General: She is not in acute distress.     Appearance: Normal appearance. She is not ill-appearing.   HENT:      Head: Normocephalic and atraumatic.      Comments: Coated, dry tongue     Mouth/Throat:      Mouth: Mucous membranes are moist.      Pharynx: No posterior oropharyngeal erythema.   Eyes:      General: No scleral icterus.  Cardiovascular:      Rate and Rhythm: Normal rate and regular rhythm.      Heart sounds: Normal heart sounds.   Pulmonary:      Effort: Pulmonary effort is normal.      Breath sounds: Normal breath sounds.   Abdominal:      General: Abdomen is flat. Bowel sounds are normal. There is no distension.      Palpations: Abdomen is soft. There is no mass.      Tenderness: There is abdominal tenderness in the periumbilical area. There is no guarding or rebound. Negative signs include Farmer's sign.      Hernia: No hernia is present.      Comments: Seems to be related to hysterectomy scars   Musculoskeletal:      Cervical back: Neck supple.   Skin:     General: Skin is warm.      Capillary Refill: Capillary refill takes less than 2 seconds.   Neurological:      General: No focal deficit present.      Mental Status: She is alert and oriented to person, place, and time.   Psychiatric:         Mood and Affect: Mood normal.         Behavior: Behavior normal.         Thought Content: Thought content normal.         Judgment: Judgment normal.         CLINICAL DATA REVIEWED   reviewed previous lab results and integrated with today's visit, reviewed notes from other physicians and/or last GI encounter, reviewed previous endoscopy results and available photos, reviewed surgical pathology results from previous biopsies    ASSESSMENT  Diagnoses and all orders for this visit:    Gastroesophageal reflux disease with esophagitis without hemorrhage  -     H. Pylori Antigen, Stool - Stool, Per Rectum  -     pantoprazole (PROTONIX) 40 MG EC tablet; Take 1 tablet by  mouth 2 (Two) Times a Day Before Meals.    Thrush    Candida infection, esophageal  -     fluconazole (Diflucan) 200 MG tablet; Take 2 tablets by mouth on day one, then take 1 tablet by mouth daily    Dyspepsia  -     H. Pylori Antigen, Stool - Stool, Per Rectum          PLAN    Diflucan course  Continue BID PPI     Return in about 6 weeks (around 4/23/2024).    I have discussed the above plan with the patient.  They verbalize understanding and are in agreement with the plan.  They have been advised to contact the office for any questions, concerns, or changes related to their health.

## 2024-03-30 ENCOUNTER — LAB (OUTPATIENT)
Dept: LAB | Facility: HOSPITAL | Age: 44
End: 2024-03-30
Payer: COMMERCIAL

## 2024-03-30 PROCEDURE — 87338 HPYLORI STOOL AG IA: CPT

## 2024-04-01 LAB — H PYLORI AG STL QL IA: NEGATIVE

## 2024-04-16 ENCOUNTER — HOSPITAL ENCOUNTER (EMERGENCY)
Facility: HOSPITAL | Age: 44
Discharge: HOME | End: 2024-04-16
Attending: EMERGENCY MEDICINE
Payer: COMMERCIAL

## 2024-04-16 VITALS
HEIGHT: 62 IN | TEMPERATURE: 98 F | HEART RATE: 100 BPM | RESPIRATION RATE: 16 BRPM | BODY MASS INDEX: 31.28 KG/M2 | OXYGEN SATURATION: 99 % | WEIGHT: 170 LBS | SYSTOLIC BLOOD PRESSURE: 120 MMHG | DIASTOLIC BLOOD PRESSURE: 79 MMHG

## 2024-04-16 DIAGNOSIS — J02.9 PHARYNGITIS, UNSPECIFIED ETIOLOGY: Primary | ICD-10-CM

## 2024-04-16 DIAGNOSIS — B37.81 CANDIDA INFECTION, ESOPHAGEAL: ICD-10-CM

## 2024-04-16 LAB — S PYO AG THROAT QL: NEGATIVE

## 2024-04-16 PROCEDURE — 87081 CULTURE SCREEN ONLY: CPT | Performed by: EMERGENCY MEDICINE

## 2024-04-16 PROCEDURE — 99283 EMERGENCY DEPT VISIT LOW MDM: CPT

## 2024-04-16 PROCEDURE — 87880 STREP A ASSAY W/OPTIC: CPT | Performed by: EMERGENCY MEDICINE

## 2024-04-16 NOTE — Clinical Note
BHARATHI RILEY  [facility] EMERGENCY DEPARTMENT  [address] [place] [address]  Phone: [phone]    Marilee Kraus was seen and treated in our emergency department on 4/16/2024.  She may return to work on 04/17/2024.         Thank you for choosing [facility].    Wolf Melgar MD

## 2024-04-16 NOTE — Clinical Note
BHARATHI RILEY  [facility] EMERGENCY DEPARTMENT  [address] [place] [address]  Phone: [phone]    Marilee Kraus was seen and treated in our emergency department on 4/16/2024.  She may return to work on 04/17/2024.         Thank you for choosing [facility].    Wolf Melgar MD       unknown

## 2024-04-16 NOTE — ED PROVIDER NOTES
Subjective   History of Present Illness  C/o sore throat 3 months, wax/wane, seen by GI rx fluconazole for poss thrush and gerd reflux finished,   today no better        Review of Systems   All other systems reviewed and are negative.      Past Medical History:   Diagnosis Date    Abnormal Pap smear of cervix 2018    ASCUS + HPV =- no follow up    Asthma     Cervical dysplasia     RUMA 2 on bx, neg LEEP, RUMA 3 on CKC, neg margins    GERD (gastroesophageal reflux disease)     S/P LEEP 2020       Allergies   Allergen Reactions    Amoxapine Itching    Amoxicillin Itching       Past Surgical History:   Procedure Laterality Date    CERVICAL BIOPSY  W/ LOOP ELECTRODE EXCISION      CERVICAL CONIZATION N/A 2022    Procedure: CERVICAL CONIZATION;  Surgeon: Sarah Berger MD;  Location: [facility];  Service: Obstetrics/Gynecology;  Laterality: N/A;     SECTION      placenta previa     SECTION       SECTION      ENDOSCOPY N/A 2021    Procedure: ESOPHAGOGASTRODUODENOSCOPY with biopsies;  Surgeon: Lee Tubbs MD;  Location: [facility] OR;  Service: Gastroenterology;  Laterality: N/A;  Reflux  Biopsies: gastric, distal esophagus    LEEP N/A 2020    Procedure: LOOP ELECTROCAUTERY EXCISION PROCEDURE;  Surgeon: Sarah Berger MD;  Location: [facility] OR;  Service: Obstetrics/Gynecology;  Laterality: N/A;    TOTAL LAPAROSCOPIC HYSTERECTOMY SALPINGECTOMY N/A 8/15/2023    Procedure: TOTAL LAPAROSCOPIC HYSTERECTOMY , bilateral salpingectomy, lysis of adhesions;  Surgeon: Sarah Berger MD;  Location: [facility];  Service: Obstetrics/Gynecology;  Laterality: N/A;       Family History   Problem Relation Age of Onset    Breast cancer Neg Hx     Ovarian cancer Neg Hx     Uterine cancer Neg Hx     Colon cancer Neg Hx     Prostate cancer Neg Hx     Deep vein thrombosis Neg Hx     Malig Hyperthermia Neg Hx        Social History     Socioeconomic  History    Marital status:     Number of children: 2   Tobacco Use    Smoking status: Never    Smokeless tobacco: Never   Vaping Use    Vaping status: Never Used   Substance and Sexual Activity    Alcohol use: Never    Drug use: Never    Sexual activity: Not Currently     Partners: Male     Birth control/protection: Hysterectomy     Comment: 8/2023- TLH/BS with OC           Objective   Physical Exam  Vitals and nursing note reviewed.   Constitutional:       Appearance: She is well-developed. She is not ill-appearing or diaphoretic.   HENT:      Head: Normocephalic and atraumatic.      Nose: No congestion.      Mouth/Throat:      Mouth: Mucous membranes are moist. No oral lesions.      Pharynx: Uvula midline. Posterior oropharyngeal erythema present. No pharyngeal swelling, oropharyngeal exudate or uvula swelling.      Tonsils: No tonsillar exudate.   Neck:      Thyroid: No thyromegaly.   Musculoskeletal:      Cervical back: Normal range of motion and neck supple.   Lymphadenopathy:      Cervical: No cervical adenopathy.   Skin:     General: Skin is warm.      Findings: No rash.   Neurological:      Mental Status: She is alert.         Procedures           ED Course  ED Course as of 04/16/24 1335   Tue Apr 16, 2024   1332 Discussed results and poss of gerd irritation vs virus vs fungal etiology  Agreed to plan for otc maalox and f/u gi, return for worse [BC]      ED Course User Index  [BC] Wolf Melgar MD                                             Medical Decision Making  Problems Addressed:  Pharyngitis, unspecified etiology: acute illness or injury    Risk  OTC drugs.        Final diagnoses:   Pharyngitis, unspecified etiology       ED Disposition  ED Disposition       ED Disposition   Discharge    Condition   Stable    Comment   --               Anni Perdomo, APRN  [address] [address] [phone]    Schedule an appointment as soon as possible for a visit        [facility] EMERGENCY DEPARTMENT  [address] [facility] [address]  [facility] [address] [phone]    As needed, If symptoms worsen    Sarah Berger MD  [address] [facility] [address]  [facility] [id number] [phone]    Schedule an appointment as soon as possible for a visit            Medication List        New Prescriptions      aluminum-magnesium hydroxide 200-200 MG/5ML suspension  Take 5 mL by mouth Every 6 (Six) Hours As Needed for Heartburn.               Where to Get Your Medications        These medications were sent to [facility] [place]  [address] [facility] [place] [address]      Hours: Monday to Friday 7 AM to 5 PM Phone: [phone]   aluminum-magnesium hydroxide 200-200 MG/5ML suspension            Wolf Melgar MD  04/16/24 3763

## 2024-04-18 LAB — BACTERIA SPEC AEROBE CULT: NORMAL

## 2024-04-24 ENCOUNTER — OFFICE VISIT (OUTPATIENT)
Dept: GASTROENTEROLOGY | Facility: CLINIC | Age: 44
End: 2024-04-24
Payer: COMMERCIAL

## 2024-04-24 VITALS
DIASTOLIC BLOOD PRESSURE: 68 MMHG | BODY MASS INDEX: 29.59 KG/M2 | HEIGHT: 62 IN | WEIGHT: 160.8 LBS | SYSTOLIC BLOOD PRESSURE: 108 MMHG

## 2024-04-24 DIAGNOSIS — K21.00 GASTROESOPHAGEAL REFLUX DISEASE WITH ESOPHAGITIS WITHOUT HEMORRHAGE: Primary | ICD-10-CM

## 2024-04-24 PROCEDURE — 99214 OFFICE O/P EST MOD 30 MIN: CPT

## 2024-04-24 NOTE — PROGRESS NOTES
PATIENT INFORMATION  Marilee Kraus       - 1980    CHIEF COMPLAINT  Chief Complaint   Patient presents with    Candida Esophagitis     Heartburn       HISTORY OF PRESENT ILLNESS    Here for GERD follow-up     Professional  used for visit.     Per LOV: : Burning has been back in throat with regurgitation for the last few months. Taking pantoprazole twice most days, did not stop taking. Same feeling, but last year was in chest, now in throat and mouth. Has noticed some sores in mouth, burning sensation, different locations in mouth. Took steroids shortly before it began 1 month ago and bothers her every day. Had steroids a few months. HP stool negative. Continued BID PPI, trialed course of diflucan. No improvement with diflucan. Was under control for the past 3 days and has controlled with diet. boiled eggs, mashed foods, banana, water    Last week blisters on tongue, everything that was eating was causing burning sensation. Reviewed will repeat EGD, if no explanation, may need to see ENT.     2021 Last EGD with mild chronic gastritis, reflux no HP or barretts.      Heartburn  She complains of chest pain. She reports no abdominal pain.       REVIEWED PERTINENT RESULTS/ LABS  Lab Results   Component Value Date    CASEREPORT  08/15/2023     Surgical Pathology Report                         Case: [id number]                                  Authorizing Provider:  Sarah Berger,  Collected:           08/15/2023 08:56 AM                                 MD                                                                           Ordering Location:     [facility]   Received:            08/15/2023 10:43 AM                                 OR                                                                           Pathologist:           Nathan Aguirre MD                                                         Specimens:   1) - Fallopian Tube, Left                                                                            2) - Fallopian Tube, Right                                                                          3) - Uterus with Cervix                                                                    FINALDX  08/15/2023     1. Left Fallopian Tube Segment, Elective Sterilization:   A. Complete cross section of fimbriated fallopian tube segment and paratubal cysts.    2. Right Fallopian Tube Segment, Elective Sterilization:   A. Complete cross section of fimbriated fallopian tube segment and paratubal cysts.    3. Uterus with Cervix:   A. Cervix (coned):    1. Focal koilocytic atypia and immature squamous metaplasia present near the                                   transformation zone.    2. No definitive dysplasia nor invasive carcinoma seen.    3. Microglandular adenosis and Nabothian cysts noted.    4. Ectocervical margin is negative for dysplasia and/or malignancy.   B. Endometrium:    1. Secretory endometrium.    2. No atypical endometrial hyperplasia nor malignancy identified.   C. Myometrium:    1. Small leiomyoma.    2. No malignancy identified.   D. Serosa:    1. No endometriosis nor malignancy identified.    jat/amparoe        Lab Results   Component Value Date    HGB 12.2 09/09/2023    MCV 90.7 09/09/2023     09/09/2023    ALT 12 09/09/2023    AST 20 09/09/2023    FERRITIN 229.00 (H) 12/11/2021    IRON 81 04/25/2022    TIBC 422 04/25/2022      No results found.    REVIEW OF SYSTEMS  Review of Systems   Constitutional: Negative.    HENT:  Positive for trouble swallowing.    Eyes: Negative.    Respiratory: Negative.     Cardiovascular:  Positive for chest pain.   Gastrointestinal:  Positive for constipation. Negative for abdominal pain and diarrhea.        GERD, Candida Esophagitis   Endocrine: Negative.    Genitourinary: Negative.    Musculoskeletal: Negative.    Skin: Negative.    Allergic/Immunologic: Negative.    Neurological:  Positive for headaches.    Hematological: Negative.    Psychiatric/Behavioral: Negative.           ACTIVE PROBLEMS  Patient Active Problem List    Diagnosis     S/P conization of cervix [Z98.890]     Chronic idiopathic constipation [K59.04]     Helicobacter pylori gastritis [K29.70, B96.81]     Cytokine release syndrome, grade 1 [D89.831]     Pneumonia due to COVID-19 virus [U07.1, J12.82]     Gastroesophageal reflux disease with esophagitis without hemorrhage [K21.00]     Epigastric pain [R10.13]     S/P LEEP [Z98.890]     Dysplasia of cervix, high grade RUMA 2 [N87.1]     Breast pain [N64.4]          PAST MEDICAL HISTORY  Past Medical History:   Diagnosis Date    Abnormal Pap smear of cervix 2018    ASCUS + HPV =- no follow up    Asthma     Cervical dysplasia     RUMA 2 on bx, neg LEEP, RUMA 3 on CKC, neg margins    GERD (gastroesophageal reflux disease)     S/P LEEP 2020         SURGICAL HISTORY  Past Surgical History:   Procedure Laterality Date    CERVICAL BIOPSY  W/ LOOP ELECTRODE EXCISION      CERVICAL CONIZATION N/A 2022    Procedure: CERVICAL CONIZATION;  Surgeon: Sarah Berger MD;  Location: [facility];  Service: Obstetrics/Gynecology;  Laterality: N/A;     SECTION      placenta previa     SECTION       SECTION      ENDOSCOPY N/A 2021    Procedure: ESOPHAGOGASTRODUODENOSCOPY with biopsies;  Surgeon: Lee Tubbs MD;  Location: [facility];  Service: Gastroenterology;  Laterality: N/A;  Reflux  Biopsies: gastric, distal esophagus    LEEP N/A 2020    Procedure: LOOP ELECTROCAUTERY EXCISION PROCEDURE;  Surgeon: Sarah Berger MD;  Location: [facility];  Service: Obstetrics/Gynecology;  Laterality: N/A;    TOTAL LAPAROSCOPIC HYSTERECTOMY SALPINGECTOMY N/A 8/15/2023    Procedure: TOTAL LAPAROSCOPIC HYSTERECTOMY , bilateral salpingectomy, lysis of adhesions;  Surgeon: Sarah Berger MD;  Location: [facility] OR;  Service:  "Obstetrics/Gynecology;  Laterality: N/A;         FAMILY HISTORY  Family History   Problem Relation Age of Onset    Breast cancer Neg Hx     Ovarian cancer Neg Hx     Uterine cancer Neg Hx     Colon cancer Neg Hx     Prostate cancer Neg Hx     Deep vein thrombosis Neg Hx     Malig Hyperthermia Neg Hx          SOCIAL HISTORY  Social History     Occupational History    Occupation: unemployed   Tobacco Use    Smoking status: Never    Smokeless tobacco: Never   Vaping Use    Vaping status: Never Used   Substance and Sexual Activity    Alcohol use: Never    Drug use: Never    Sexual activity: Not Currently     Partners: Male     Birth control/protection: Hysterectomy     Comment: 8/2023- TLH/BS with OC         CURRENT MEDICATIONS    Current Outpatient Medications:     aluminum-magnesium hydroxide 200-200 MG/5ML suspension, Take 5 mL by mouth Every 6 (Six) Hours As Needed for Heartburn., Disp: 30 mL, Rfl: 0    ibuprofen (ADVIL,MOTRIN) 800 MG tablet, Take 1 tablet by mouth Every 8 (Eight) Hours As Needed for Moderate Pain, Disp: 30 tablet, Rfl: 0    pantoprazole (PROTONIX) 40 MG EC tablet, Take 1 tablet by mouth 2 (Two) Times a Day Before Meals., Disp: 180 tablet, Rfl: 3    ALLERGIES  Amoxapine and Amoxicillin    VITALS  Vitals:    04/24/24 1524   BP: 108/68   BP Location: Left arm   Patient Position: Sitting   Cuff Size: Large Adult   Weight: 72.9 kg (160 lb 12.8 oz)   Height: 157.5 cm (62.01\")       PHYSICAL EXAM  Debilities/Disabilities Identified: None  Emotional Behavior: Appropriate  Wt Readings from Last 3 Encounters:   04/24/24 72.9 kg (160 lb 12.8 oz)   04/16/24 77.1 kg (170 lb)   03/12/24 76 kg (167 lb 9.6 oz)     Ht Readings from Last 1 Encounters:   04/24/24 157.5 cm (62.01\")     Body mass index is 29.4 kg/m².  Physical Exam  Constitutional:       General: She is not in acute distress.     Appearance: Normal appearance. She is not ill-appearing.   HENT:      Head: Normocephalic and atraumatic.      Mouth/Throat: "      Mouth: Mucous membranes are moist.      Pharynx: No posterior oropharyngeal erythema.   Eyes:      General: No scleral icterus.  Cardiovascular:      Rate and Rhythm: Normal rate and regular rhythm.      Heart sounds: Normal heart sounds.   Pulmonary:      Effort: Pulmonary effort is normal.      Breath sounds: Normal breath sounds.   Abdominal:      General: Abdomen is flat. Bowel sounds are normal. There is no distension.      Palpations: Abdomen is soft. There is no mass.      Tenderness: There is no abdominal tenderness. There is no guarding or rebound. Negative signs include Farmer's sign.      Hernia: No hernia is present.   Musculoskeletal:      Cervical back: Neck supple.   Skin:     General: Skin is warm.      Capillary Refill: Capillary refill takes less than 2 seconds.   Neurological:      General: No focal deficit present.      Mental Status: She is alert and oriented to person, place, and time.   Psychiatric:         Mood and Affect: Mood normal.         Behavior: Behavior normal.         Thought Content: Thought content normal.         Judgment: Judgment normal.         CLINICAL DATA REVIEWED   reviewed previous lab results and integrated with today's visit, reviewed notes from other physicians and/or last GI encounter, reviewed previous endoscopy results and available photos, reviewed surgical pathology results from previous biopsies    ASSESSMENT  Diagnoses and all orders for this visit:    Gastroesophageal reflux disease with esophagitis without hemorrhage  -     Case Request; Standing  -     Case Request    Other orders  -     Follow Anesthesia Guidelines / Protocol; Future  -     Obtain Informed Consent; Standing          PLAN    EGD  Continue BID PPI    Return in about 3 months (around 7/24/2024).    I have discussed the above plan with the patient.  They verbalize understanding and are in agreement with the plan.  They have been advised to contact the office for any questions, concerns, or  changes related to their health.

## 2024-04-24 NOTE — H&P (VIEW-ONLY)
PATIENT INFORMATION  Marilee Kraus       - 1980    CHIEF COMPLAINT  Chief Complaint   Patient presents with    Candida Esophagitis     Heartburn       HISTORY OF PRESENT ILLNESS    Here for GERD follow-up     Professional  used for visit.     Per LOV: : Burning has been back in throat with regurgitation for the last few months. Taking pantoprazole twice most days, did not stop taking. Same feeling, but last year was in chest, now in throat and mouth. Has noticed some sores in mouth, burning sensation, different locations in mouth. Took steroids shortly before it began 1 month ago and bothers her every day. Had steroids a few months. HP stool negative. Continued BID PPI, trialed course of diflucan. No improvement with diflucan. Was under control for the past 3 days and has controlled with diet. boiled eggs, mashed foods, banana, water    Last week blisters on tongue, everything that was eating was causing burning sensation. Reviewed will repeat EGD, if no explanation, may need to see ENT.     2021 Last EGD with mild chronic gastritis, reflux no HP or barretts.      Heartburn  She complains of chest pain. She reports no abdominal pain.       REVIEWED PERTINENT RESULTS/ LABS  Lab Results   Component Value Date    CASEREPORT  08/15/2023     Surgical Pathology Report                         Case: [id number]                                  Authorizing Provider:  Sarah Berger,  Collected:           08/15/2023 08:56 AM                                 MD                                                                           Ordering Location:     [facility]   Received:            08/15/2023 10:43 AM                                 OR                                                                           Pathologist:           Nathan Aguirre MD                                                         Specimens:   1) - Fallopian Tube, Left                                                                            2) - Fallopian Tube, Right                                                                          3) - Uterus with Cervix                                                                    FINALDX  08/15/2023     1. Left Fallopian Tube Segment, Elective Sterilization:   A. Complete cross section of fimbriated fallopian tube segment and paratubal cysts.    2. Right Fallopian Tube Segment, Elective Sterilization:   A. Complete cross section of fimbriated fallopian tube segment and paratubal cysts.    3. Uterus with Cervix:   A. Cervix (coned):    1. Focal koilocytic atypia and immature squamous metaplasia present near the                                   transformation zone.    2. No definitive dysplasia nor invasive carcinoma seen.    3. Microglandular adenosis and Nabothian cysts noted.    4. Ectocervical margin is negative for dysplasia and/or malignancy.   B. Endometrium:    1. Secretory endometrium.    2. No atypical endometrial hyperplasia nor malignancy identified.   C. Myometrium:    1. Small leiomyoma.    2. No malignancy identified.   D. Serosa:    1. No endometriosis nor malignancy identified.    jat/amparoe        Lab Results   Component Value Date    HGB 12.2 09/09/2023    MCV 90.7 09/09/2023     09/09/2023    ALT 12 09/09/2023    AST 20 09/09/2023    FERRITIN 229.00 (H) 12/11/2021    IRON 81 04/25/2022    TIBC 422 04/25/2022      No results found.    REVIEW OF SYSTEMS  Review of Systems   Constitutional: Negative.    HENT:  Positive for trouble swallowing.    Eyes: Negative.    Respiratory: Negative.     Cardiovascular:  Positive for chest pain.   Gastrointestinal:  Positive for constipation. Negative for abdominal pain and diarrhea.        GERD, Candida Esophagitis   Endocrine: Negative.    Genitourinary: Negative.    Musculoskeletal: Negative.    Skin: Negative.    Allergic/Immunologic: Negative.    Neurological:  Positive for headaches.    Hematological: Negative.    Psychiatric/Behavioral: Negative.           ACTIVE PROBLEMS  Patient Active Problem List    Diagnosis     S/P conization of cervix [Z98.890]     Chronic idiopathic constipation [K59.04]     Helicobacter pylori gastritis [K29.70, B96.81]     Cytokine release syndrome, grade 1 [D89.831]     Pneumonia due to COVID-19 virus [U07.1, J12.82]     Gastroesophageal reflux disease with esophagitis without hemorrhage [K21.00]     Epigastric pain [R10.13]     S/P LEEP [Z98.890]     Dysplasia of cervix, high grade RUMA 2 [N87.1]     Breast pain [N64.4]          PAST MEDICAL HISTORY  Past Medical History:   Diagnosis Date    Abnormal Pap smear of cervix 2018    ASCUS + HPV =- no follow up    Asthma     Cervical dysplasia     RUMA 2 on bx, neg LEEP, RUMA 3 on CKC, neg margins    GERD (gastroesophageal reflux disease)     S/P LEEP 2020         SURGICAL HISTORY  Past Surgical History:   Procedure Laterality Date    CERVICAL BIOPSY  W/ LOOP ELECTRODE EXCISION      CERVICAL CONIZATION N/A 2022    Procedure: CERVICAL CONIZATION;  Surgeon: Sarah Berger MD;  Location: [facility];  Service: Obstetrics/Gynecology;  Laterality: N/A;     SECTION      placenta previa     SECTION       SECTION      ENDOSCOPY N/A 2021    Procedure: ESOPHAGOGASTRODUODENOSCOPY with biopsies;  Surgeon: Lee Tubbs MD;  Location: [facility];  Service: Gastroenterology;  Laterality: N/A;  Reflux  Biopsies: gastric, distal esophagus    LEEP N/A 2020    Procedure: LOOP ELECTROCAUTERY EXCISION PROCEDURE;  Surgeon: Sarah Berger MD;  Location: [facility];  Service: Obstetrics/Gynecology;  Laterality: N/A;    TOTAL LAPAROSCOPIC HYSTERECTOMY SALPINGECTOMY N/A 8/15/2023    Procedure: TOTAL LAPAROSCOPIC HYSTERECTOMY , bilateral salpingectomy, lysis of adhesions;  Surgeon: Sarah Berger MD;  Location: [facility] OR;  Service:  "Obstetrics/Gynecology;  Laterality: N/A;         FAMILY HISTORY  Family History   Problem Relation Age of Onset    Breast cancer Neg Hx     Ovarian cancer Neg Hx     Uterine cancer Neg Hx     Colon cancer Neg Hx     Prostate cancer Neg Hx     Deep vein thrombosis Neg Hx     Malig Hyperthermia Neg Hx          SOCIAL HISTORY  Social History     Occupational History    Occupation: unemployed   Tobacco Use    Smoking status: Never    Smokeless tobacco: Never   Vaping Use    Vaping status: Never Used   Substance and Sexual Activity    Alcohol use: Never    Drug use: Never    Sexual activity: Not Currently     Partners: Male     Birth control/protection: Hysterectomy     Comment: 8/2023- TLH/BS with OC         CURRENT MEDICATIONS    Current Outpatient Medications:     aluminum-magnesium hydroxide 200-200 MG/5ML suspension, Take 5 mL by mouth Every 6 (Six) Hours As Needed for Heartburn., Disp: 30 mL, Rfl: 0    ibuprofen (ADVIL,MOTRIN) 800 MG tablet, Take 1 tablet by mouth Every 8 (Eight) Hours As Needed for Moderate Pain, Disp: 30 tablet, Rfl: 0    pantoprazole (PROTONIX) 40 MG EC tablet, Take 1 tablet by mouth 2 (Two) Times a Day Before Meals., Disp: 180 tablet, Rfl: 3    ALLERGIES  Amoxapine and Amoxicillin    VITALS  Vitals:    04/24/24 1524   BP: 108/68   BP Location: Left arm   Patient Position: Sitting   Cuff Size: Large Adult   Weight: 72.9 kg (160 lb 12.8 oz)   Height: 157.5 cm (62.01\")       PHYSICAL EXAM  Debilities/Disabilities Identified: None  Emotional Behavior: Appropriate  Wt Readings from Last 3 Encounters:   04/24/24 72.9 kg (160 lb 12.8 oz)   04/16/24 77.1 kg (170 lb)   03/12/24 76 kg (167 lb 9.6 oz)     Ht Readings from Last 1 Encounters:   04/24/24 157.5 cm (62.01\")     Body mass index is 29.4 kg/m².  Physical Exam  Constitutional:       General: She is not in acute distress.     Appearance: Normal appearance. She is not ill-appearing.   HENT:      Head: Normocephalic and atraumatic.      Mouth/Throat: "      Mouth: Mucous membranes are moist.      Pharynx: No posterior oropharyngeal erythema.   Eyes:      General: No scleral icterus.  Cardiovascular:      Rate and Rhythm: Normal rate and regular rhythm.      Heart sounds: Normal heart sounds.   Pulmonary:      Effort: Pulmonary effort is normal.      Breath sounds: Normal breath sounds.   Abdominal:      General: Abdomen is flat. Bowel sounds are normal. There is no distension.      Palpations: Abdomen is soft. There is no mass.      Tenderness: There is no abdominal tenderness. There is no guarding or rebound. Negative signs include Farmer's sign.      Hernia: No hernia is present.   Musculoskeletal:      Cervical back: Neck supple.   Skin:     General: Skin is warm.      Capillary Refill: Capillary refill takes less than 2 seconds.   Neurological:      General: No focal deficit present.      Mental Status: She is alert and oriented to person, place, and time.   Psychiatric:         Mood and Affect: Mood normal.         Behavior: Behavior normal.         Thought Content: Thought content normal.         Judgment: Judgment normal.         CLINICAL DATA REVIEWED   reviewed previous lab results and integrated with today's visit, reviewed notes from other physicians and/or last GI encounter, reviewed previous endoscopy results and available photos, reviewed surgical pathology results from previous biopsies    ASSESSMENT  Diagnoses and all orders for this visit:    Gastroesophageal reflux disease with esophagitis without hemorrhage  -     Case Request; Standing  -     Case Request    Other orders  -     Follow Anesthesia Guidelines / Protocol; Future  -     Obtain Informed Consent; Standing          PLAN    EGD  Continue BID PPI    Return in about 3 months (around 7/24/2024).    I have discussed the above plan with the patient.  They verbalize understanding and are in agreement with the plan.  They have been advised to contact the office for any questions, concerns, or  changes related to their health.

## 2024-05-07 ENCOUNTER — ANESTHESIA EVENT (OUTPATIENT)
Dept: PERIOP | Facility: HOSPITAL | Age: 44
End: 2024-05-07
Payer: COMMERCIAL

## 2024-05-08 RX ORDER — LORATADINE 10 MG/1
CAPSULE, LIQUID FILLED ORAL
COMMUNITY

## 2024-05-09 ENCOUNTER — ANESTHESIA (OUTPATIENT)
Dept: PERIOP | Facility: HOSPITAL | Age: 44
End: 2024-05-09
Payer: COMMERCIAL

## 2024-05-09 ENCOUNTER — HOSPITAL ENCOUNTER (OUTPATIENT)
Facility: HOSPITAL | Age: 44
Setting detail: HOSPITAL OUTPATIENT SURGERY
Discharge: HOME | End: 2024-05-09
Attending: INTERNAL MEDICINE | Admitting: INTERNAL MEDICINE
Payer: COMMERCIAL

## 2024-05-09 VITALS
SYSTOLIC BLOOD PRESSURE: 109 MMHG | DIASTOLIC BLOOD PRESSURE: 77 MMHG | OXYGEN SATURATION: 99 % | TEMPERATURE: 98.1 F | WEIGHT: 158 LBS | HEIGHT: 62 IN | HEART RATE: 69 BPM | RESPIRATION RATE: 16 BRPM | BODY MASS INDEX: 29.08 KG/M2

## 2024-05-09 DIAGNOSIS — K21.00 GASTROESOPHAGEAL REFLUX DISEASE WITH ESOPHAGITIS WITHOUT HEMORRHAGE: ICD-10-CM

## 2024-05-09 PROCEDURE — 25810000003 LACTATED RINGERS PER 1000 ML: Performed by: NURSE ANESTHETIST, CERTIFIED REGISTERED

## 2024-05-09 PROCEDURE — 88305 TISSUE EXAM BY PATHOLOGIST: CPT | Performed by: INTERNAL MEDICINE

## 2024-05-09 PROCEDURE — 43239 EGD BIOPSY SINGLE/MULTIPLE: CPT | Performed by: INTERNAL MEDICINE

## 2024-05-09 PROCEDURE — 25010000002 PROPOFOL 200 MG/20ML EMULSION: Performed by: NURSE ANESTHETIST, CERTIFIED REGISTERED

## 2024-05-09 RX ORDER — SODIUM CHLORIDE 9 MG/ML
40 INJECTION, SOLUTION INTRAVENOUS AS NEEDED
Status: DISCONTINUED | OUTPATIENT
Start: 2024-05-09 | End: 2024-05-09 | Stop reason: HOSPADM

## 2024-05-09 RX ORDER — FAMOTIDINE 10 MG/ML
20 INJECTION, SOLUTION INTRAVENOUS ONCE
Status: COMPLETED | OUTPATIENT
Start: 2024-05-09 | End: 2024-05-09

## 2024-05-09 RX ORDER — SODIUM CHLORIDE, SODIUM LACTATE, POTASSIUM CHLORIDE, CALCIUM CHLORIDE 600; 310; 30; 20 MG/100ML; MG/100ML; MG/100ML; MG/100ML
100 INJECTION, SOLUTION INTRAVENOUS CONTINUOUS
Status: DISCONTINUED | OUTPATIENT
Start: 2024-05-09 | End: 2024-05-09 | Stop reason: HOSPADM

## 2024-05-09 RX ORDER — LIDOCAINE HYDROCHLORIDE 10 MG/ML
0.5 INJECTION, SOLUTION INFILTRATION; PERINEURAL ONCE AS NEEDED
Status: DISCONTINUED | OUTPATIENT
Start: 2024-05-09 | End: 2024-05-09 | Stop reason: HOSPADM

## 2024-05-09 RX ORDER — DIPHENHYDRAMINE HYDROCHLORIDE 50 MG/ML
12.5 INJECTION INTRAMUSCULAR; INTRAVENOUS
Status: DISCONTINUED | OUTPATIENT
Start: 2024-05-09 | End: 2024-05-09 | Stop reason: HOSPADM

## 2024-05-09 RX ORDER — SODIUM CHLORIDE 0.9 % (FLUSH) 0.9 %
10 SYRINGE (ML) INJECTION AS NEEDED
Status: DISCONTINUED | OUTPATIENT
Start: 2024-05-09 | End: 2024-05-09 | Stop reason: HOSPADM

## 2024-05-09 RX ORDER — SODIUM CHLORIDE 0.9 % (FLUSH) 0.9 %
10 SYRINGE (ML) INJECTION EVERY 12 HOURS SCHEDULED
Status: DISCONTINUED | OUTPATIENT
Start: 2024-05-09 | End: 2024-05-09 | Stop reason: HOSPADM

## 2024-05-09 RX ORDER — LIDOCAINE HYDROCHLORIDE 20 MG/ML
INJECTION, SOLUTION INFILTRATION; PERINEURAL AS NEEDED
Status: DISCONTINUED | OUTPATIENT
Start: 2024-05-09 | End: 2024-05-09 | Stop reason: SURG

## 2024-05-09 RX ORDER — PROPOFOL 10 MG/ML
INJECTION, EMULSION INTRAVENOUS AS NEEDED
Status: DISCONTINUED | OUTPATIENT
Start: 2024-05-09 | End: 2024-05-09 | Stop reason: SURG

## 2024-05-09 RX ORDER — SODIUM CHLORIDE, SODIUM LACTATE, POTASSIUM CHLORIDE, CALCIUM CHLORIDE 600; 310; 30; 20 MG/100ML; MG/100ML; MG/100ML; MG/100ML
9 INJECTION, SOLUTION INTRAVENOUS CONTINUOUS
Status: DISCONTINUED | OUTPATIENT
Start: 2024-05-09 | End: 2024-05-09 | Stop reason: HOSPADM

## 2024-05-09 RX ORDER — ONDANSETRON 2 MG/ML
4 INJECTION INTRAMUSCULAR; INTRAVENOUS ONCE AS NEEDED
Status: DISCONTINUED | OUTPATIENT
Start: 2024-05-09 | End: 2024-05-09 | Stop reason: HOSPADM

## 2024-05-09 RX ADMIN — PROPOFOL 100 MG: 10 INJECTION, EMULSION INTRAVENOUS at 16:20

## 2024-05-09 RX ADMIN — SODIUM CHLORIDE, POTASSIUM CHLORIDE, SODIUM LACTATE AND CALCIUM CHLORIDE 9 ML/HR: 600; 310; 30; 20 INJECTION, SOLUTION INTRAVENOUS at 15:16

## 2024-05-09 RX ADMIN — PROPOFOL 100 MG: 10 INJECTION, EMULSION INTRAVENOUS at 16:25

## 2024-05-09 RX ADMIN — PROPOFOL 100 MG: 10 INJECTION, EMULSION INTRAVENOUS at 16:18

## 2024-05-09 RX ADMIN — LIDOCAINE HYDROCHLORIDE 100 MG: 20 INJECTION, SOLUTION INFILTRATION; PERINEURAL at 16:18

## 2024-05-09 RX ADMIN — FAMOTIDINE 20 MG: 10 INJECTION, SOLUTION INTRAVENOUS at 15:16

## 2024-05-09 NOTE — BRIEF OP NOTE
ESOPHAGOGASTRODUODENOSCOPY WITH BIOPSY  Progress Note    Marilee Kraus  5/9/2024    Pre-op Diagnosis:   Gastroesophageal reflux disease with esophagitis without hemorrhage [K21.00]       Post-Op Diagnosis Codes:     * Gastroesophageal reflux disease with esophagitis without hemorrhage [K21.00]     * Gastritis [K29.70]     * Reflux esophagitis [K21.00]    Procedure/CPT® Codes:        Procedure(s):  ESOPHAGOGASTRODUODENOSCOPY WITH BIOPSY              Surgeon(s):  Lee Tubbs MD    Anesthesia: Monitored Anesthesia Care    Staff:   Circulator: Hilary Low RN  Scrub Person: Temi Kidd         Estimated Blood Loss: none    Urine Voided: * No values recorded between 5/9/2024  4:14 PM and 5/9/2024  4:27 PM *    Specimens:                Specimens       ID Source Type Tests Collected By Collected At Frozen?    A Stomach Tissue TISSUE PATHOLOGY EXAM   Lee Tubbs MD 5/9/24 0827     Description: gastric biopsy    This specimen was not marked as sent.    B Esophagus, Distal Tissue TISSUE PATHOLOGY EXAM   Lee Tubbs MD 5/9/24 6166     Description: distal esophagus biopsy    This specimen was not marked as sent.                  Drains: * No LDAs found *    Findings: Normal Duodenum  Gastritis-Biopsy  Reflux Esophagitis-Biopsy        Complications: none          Lee Tubbs MD     Date: 5/9/2024  Time: 16:29 EDT

## 2024-05-09 NOTE — INTERVAL H&P NOTE
"Vital signs  BP 92/62   Pulse 83   Temp 98.1 °F (36.7 °C) (Oral)   Resp 16   Ht 157.5 cm (62\")   Wt 71.7 kg (158 lb)   LMP 07/19/2023 (Exact Date)   SpO2 99%   BMI 28.90 kg/m²     H&P reviewed. The patient was examined and there are no changes to the H&P.        "

## 2024-05-09 NOTE — LETTER
May 9, 2024     Patient: Marilee Kraus   YOB: 1980   Date of Visit: 4/24/2024       To Whom It May Concern:    It is my medical opinion that Marilee Kraus may return to work on 5/14/24 .           Sincerely,      LINDA Forbes

## 2024-05-13 LAB
LAB AP CASE REPORT: NORMAL
PATH REPORT.FINAL DX SPEC: NORMAL
PATH REPORT.GROSS SPEC: NORMAL

## 2024-05-23 ENCOUNTER — OFFICE VISIT (OUTPATIENT)
Dept: OBSTETRICS AND GYNECOLOGY | Facility: CLINIC | Age: 44
End: 2024-05-23
Payer: COMMERCIAL

## 2024-05-23 VITALS
SYSTOLIC BLOOD PRESSURE: 112 MMHG | BODY MASS INDEX: 29.08 KG/M2 | WEIGHT: 158 LBS | HEIGHT: 62 IN | DIASTOLIC BLOOD PRESSURE: 74 MMHG

## 2024-05-23 DIAGNOSIS — B37.9 YEAST INFECTION: ICD-10-CM

## 2024-05-23 DIAGNOSIS — Z12.72 SMEAR, VAGINAL, AS PART OF ROUTINE GYNECOLOGICAL EXAMINATION: ICD-10-CM

## 2024-05-23 DIAGNOSIS — N89.8 VAGINAL ITCHING: ICD-10-CM

## 2024-05-23 DIAGNOSIS — Z12.31 ENCOUNTER FOR SCREENING MAMMOGRAM FOR MALIGNANT NEOPLASM OF BREAST: ICD-10-CM

## 2024-05-23 DIAGNOSIS — Z01.419 SMEAR, VAGINAL, AS PART OF ROUTINE GYNECOLOGICAL EXAMINATION: ICD-10-CM

## 2024-05-23 DIAGNOSIS — Z11.51 SPECIAL SCREENING EXAMINATION FOR HUMAN PAPILLOMAVIRUS (HPV): ICD-10-CM

## 2024-05-23 DIAGNOSIS — Z13.89 SCREENING FOR GENITOURINARY CONDITION: Primary | ICD-10-CM

## 2024-05-23 RX ORDER — FLUCONAZOLE 150 MG/1
150 TABLET ORAL ONCE
Qty: 1 TABLET | Refills: 1 | Status: SHIPPED | OUTPATIENT
Start: 2024-05-23 | End: 2024-05-24

## 2024-05-23 NOTE — PROGRESS NOTES
"      Marilee Kraus is a 44 y.o. patient who presents for follow up of   Chief Complaint   Patient presents with    Vaginal Itching     45 yo est pt here for vaginal itching. She had recent ABX and has vaginal irritation. She also had a TLH/BS with OC for cervical dysplasia in 8/2023. She needs a repeat pap smear.         The following portions of the patient's history were reviewed and updated as appropriate: allergies, current medications and problem list.    Review of Systems   Constitutional:  Positive for activity change.   Genitourinary:  Positive for vaginal discharge and vaginal pain. Negative for menstrual problem and vaginal bleeding.       /74   Ht 157.5 cm (62\")   Wt 71.7 kg (158 lb)   LMP 07/19/2023 (Exact Date)   BMI 28.90 kg/m²     Physical Exam  Vitals and nursing note reviewed. Exam conducted with a chaperone present.   Constitutional:       Appearance: Normal appearance. She is well-developed and normal weight.   HENT:      Head: Normocephalic and atraumatic.   Eyes:      General: No scleral icterus.     Conjunctiva/sclera: Conjunctivae normal.   Neck:      Thyroid: No thyromegaly.   Abdominal:      General: There is no distension.      Palpations: Abdomen is soft. There is no mass.      Tenderness: There is no abdominal tenderness. There is no guarding or rebound.      Hernia: No hernia is present.   Genitourinary:     Labia:         Right: No rash, tenderness, lesion or injury.         Left: No rash, tenderness, lesion or injury.       Urethra: No prolapse, urethral pain, urethral swelling or urethral lesion.      Vagina: Vaginal discharge present.      Uterus: Absent.       Adnexa: Right adnexa normal and left adnexa normal.      Comments: Uterus and cervix absent  Normal cuff  + yeast  Skin:     General: Skin is warm and dry.   Neurological:      Mental Status: She is alert and oriented to person, place, and time.   Psychiatric:         Behavior: Behavior normal.         Thought " Content: Thought content normal.         Judgment: Judgment normal.         A/P:  1. Yeast infection- ERX Diflucan 150 mg   2. Vaginal itching- check Y/M/L/B  3. H/O RUMA 3 s/p [facility] 8/2023- check pap/HPV  4. MMG UTD 8/2023 B1. Schedule MMG 8/2024  5. RTO 6 months annual and/or prn  6. EPIC LOS calculator used to determine coding level.       Assessment & Plan   Diagnoses and all orders for this visit:    1. Screening for genitourinary condition (Primary)  -     POC Urinalysis Dipstick    2. Vaginal itching  -     NuSwab VG+ - Swab, Vagina  -     Genital Mycoplasmas MANJIT, Swab - Swab, Vagina    3. Smear, vaginal, as part of routine gynecological examination  -     IGP, Apt HPV,rfx 16 / 18,45    4. Special screening examination for human papillomavirus (HPV)  -     IGP, Apt HPV,rfx 16 / 18,45    5. Encounter for screening mammogram for malignant neoplasm of breast  -     Mammo Screening Digital Tomosynthesis Bilateral With CAD; Future    6. Yeast infection    Other orders  -     fluconazole (DIFLUCAN) 150 MG tablet; Take 1 tablet by mouth 1 (One) Time for 1 dose.  Dispense: 1 tablet; Refill: 1                 No follow-ups on file.      Sarah Berger MD    5/23/2024  19:48 EDT

## 2024-05-29 LAB
CYTOLOGIST CVX/VAG CYTO: ABNORMAL
CYTOLOGY CVX/VAG DOC CYTO: ABNORMAL
CYTOLOGY CVX/VAG DOC THIN PREP: ABNORMAL
DX ICD CODE: ABNORMAL
DX ICD CODE: ABNORMAL
HPV I/H RISK 4 DNA CVX QL PROBE+SIG AMP: NEGATIVE
Lab: ABNORMAL
OTHER STN SPEC: ABNORMAL
PATHOLOGIST CVX/VAG CYTO: ABNORMAL
RECOM F/U CVX/VAG CYTO: ABNORMAL
STAT OF ADQ CVX/VAG CYTO-IMP: ABNORMAL

## 2024-05-29 RX ORDER — DOXYCYCLINE HYCLATE 100 MG
100 TABLET ORAL 2 TIMES DAILY
Qty: 14 TABLET | Refills: 0 | Status: SHIPPED | OUTPATIENT
Start: 2024-05-29 | End: 2024-06-05

## 2024-07-17 ENCOUNTER — OFFICE VISIT (OUTPATIENT)
Dept: GASTROENTEROLOGY | Facility: CLINIC | Age: 44
End: 2024-07-17
Payer: COMMERCIAL

## 2024-07-17 VITALS
BODY MASS INDEX: 28.85 KG/M2 | SYSTOLIC BLOOD PRESSURE: 102 MMHG | DIASTOLIC BLOOD PRESSURE: 72 MMHG | WEIGHT: 156.8 LBS | HEIGHT: 62 IN

## 2024-07-17 DIAGNOSIS — K21.00 GASTROESOPHAGEAL REFLUX DISEASE WITH ESOPHAGITIS WITHOUT HEMORRHAGE: ICD-10-CM

## 2024-07-17 PROCEDURE — 99213 OFFICE O/P EST LOW 20 MIN: CPT | Performed by: INTERNAL MEDICINE

## 2024-07-17 RX ORDER — PANTOPRAZOLE SODIUM 40 MG/1
40 TABLET, DELAYED RELEASE ORAL
Qty: 180 TABLET | Refills: 3 | Status: SHIPPED | OUTPATIENT
Start: 2024-07-17

## 2024-07-17 NOTE — PROGRESS NOTES
PATIENT INFORMATION  Marilee Kraus       - 1980    CHIEF COMPLAINT  Chief Complaint   Patient presents with    Heartburn       HISTORY OF PRESENT ILLNESS  Follow up from EGD [facility] Reflux and chemical gastropathy    Is on BID PPI    Overall is better and only rare breakthrough- none in the last 2 weeks and is on a healthier diet    WE reviewed the reflux and she is exercising and less bloatiing - no real wt loss but smaller sized clothes    Is on oatmeal fier but takes 1-2 weeks then off for 3 weeks then repeats- reviewed daily fiber recommendations- 10 grms daily    Alos reviewed treating allergies / drainage with antihistamines along with her reflux meds        REVIEWED PERTINENT RESULTS/ LABS  Lab Results   Component Value Date    CASEREPORT  2024     Surgical Pathology Report                         Case: [id number]                                  Authorizing Provider:  Lee Tubbs        Collected:           2024 04:22 PM                                 MD Clement                                                                   Ordering Location:     [facility]   Received:            2024 05:06 PM                                 OR                                                                           Pathologist:           Shruti Menjivar MD                                                          Specimens:   1) - Stomach, gastric biopsy                                                                        2) - Esophagus, Distal, distal esophagus biopsy                                            FINALDX  2024     1.  Stomach, biopsy: Antral and oxyntic type gastric mucosa with   A. Mild reactive/chemical gastropathy; no significant inflammation.   B. No metaplasia, dysplasia nor malignancy.   C. No H. pylori-like organisms identified.    2.  Esophagus, distal, biopsy: Squamoglandular mucosa and submucosa with    A. No significant  eosinophilia.    B. No definitive well-developed goblet cell metaplasia identified by routine staining.   C. No dysplasia nor malignancy.                 D. No viral inclusions nor fungal organisms identified.       Lab Results   Component Value Date    HGB 12.2 09/09/2023    MCV 90.7 09/09/2023     09/09/2023    ALT 12 09/09/2023    AST 20 09/09/2023    FERRITIN 229.00 (H) 12/11/2021    IRON 81 04/25/2022    TIBC 422 04/25/2022      No results found.    REVIEW OF SYSTEMS  Review of Systems   Constitutional:  Negative for activity change, chills, fever and unexpected weight change.   HENT:  Positive for trouble swallowing. Negative for congestion.    Eyes:  Negative for visual disturbance.   Respiratory:  Negative for shortness of breath.    Cardiovascular:  Negative for chest pain and palpitations.   Gastrointestinal:  Negative for abdominal pain and blood in stool.   Endocrine: Negative for cold intolerance and heat intolerance.   Genitourinary:  Negative for hematuria.   Musculoskeletal:  Negative for gait problem.   Skin:  Negative for color change.   Allergic/Immunologic: Negative for immunocompromised state.   Neurological:  Negative for weakness and light-headedness.   Hematological:  Negative for adenopathy.   Psychiatric/Behavioral:  Negative for sleep disturbance. The patient is not nervous/anxious.          ACTIVE PROBLEMS  Patient Active Problem List    Diagnosis     S/P conization of cervix [Z98.890]     Chronic idiopathic constipation [K59.04]     Helicobacter pylori gastritis [K29.70, B96.81]     Cytokine release syndrome, grade 1 [D89.831]     Pneumonia due to COVID-19 virus [U07.1, J12.82]     Gastroesophageal reflux disease with esophagitis without hemorrhage [K21.00]     Epigastric pain [R10.13]     S/P LEEP [Z98.890]     Dysplasia of cervix, high grade RUMA 2 [N87.1]     Breast pain [N64.4]          PAST MEDICAL HISTORY  Past Medical History:   Diagnosis Date    Abnormal Pap smear of cervix  2018    ASCUS + HPV =- no follow up    Asthma     Cervical dysplasia     RUMA 2 on bx, neg LEEP, RUMA 3 on CKC, neg margins    GERD (gastroesophageal reflux disease)     S/P LEEP 2020         SURGICAL HISTORY  Past Surgical History:   Procedure Laterality Date    CERVICAL BIOPSY  W/ LOOP ELECTRODE EXCISION      CERVICAL CONIZATION N/A 2022    Procedure: CERVICAL CONIZATION;  Surgeon: Sarah Berger MD;  Location:  [facility] OR;  Service: Obstetrics/Gynecology;  Laterality: N/A;     SECTION      placenta previa     SECTION       SECTION      ENDOSCOPY N/A 2021    Procedure: ESOPHAGOGASTRODUODENOSCOPY with biopsies;  Surgeon: Lee Tubbs MD;  Location:  [facility] OR;  Service: Gastroenterology;  Laterality: N/A;  Reflux  Biopsies: gastric, distal esophagus    ENDOSCOPY N/A 2024    Procedure: ESOPHAGOGASTRODUODENOSCOPY WITH BIOPSY;  Surgeon: Lee Tubbs MD;  Location: [facility] OR;  Service: Gastroenterology;  Laterality: N/A;  gastritis- gastric biopsy, esophagitis-distal esophagus biopsy    LEEP N/A 2020    Procedure: LOOP ELECTROCAUTERY EXCISION PROCEDURE;  Surgeon: Sarah Berger MD;  Location:  [facility] OR;  Service: Obstetrics/Gynecology;  Laterality: N/A;    TOTAL LAPAROSCOPIC HYSTERECTOMY SALPINGECTOMY N/A 8/15/2023    Procedure: TOTAL LAPAROSCOPIC HYSTERECTOMY , bilateral salpingectomy, lysis of adhesions;  Surgeon: Sarah Berger MD;  Location: [facility] OR;  Service: Obstetrics/Gynecology;  Laterality: N/A;         FAMILY HISTORY  Family History   Problem Relation Age of Onset    Breast cancer Neg Hx     Ovarian cancer Neg Hx     Uterine cancer Neg Hx     Colon cancer Neg Hx     Prostate cancer Neg Hx     Deep vein thrombosis Neg Hx     Malig Hyperthermia Neg Hx          SOCIAL HISTORY  Social History     Occupational History    Occupation: unemployed   Tobacco Use    Smoking status: Never     "Smokeless tobacco: Never   Vaping Use    Vaping status: Never Used   Substance and Sexual Activity    Alcohol use: Never    Drug use: Never    Sexual activity: Not Currently     Partners: Male     Birth control/protection: Hysterectomy     Comment: 8/2023- TLH/BS with OC         CURRENT MEDICATIONS    Current Outpatient Medications:     Loratadine 10 MG capsule, Take  by mouth., Disp: , Rfl:     pantoprazole (PROTONIX) 40 MG EC tablet, Take 1 tablet by mouth 2 (Two) Times a Day Before Meals., Disp: 180 tablet, Rfl: 3    ALLERGIES  Amoxapine and Amoxicillin    VITALS  Vitals:    07/17/24 0807   BP: 102/72   BP Location: Left arm   Patient Position: Sitting   Cuff Size: Adult   Weight: 71.1 kg (156 lb 12.8 oz)   Height: 157.5 cm (62\")       PHYSICAL EXAM  Debilities/Disabilities Identified: None  Emotional Behavior: Appropriate  Wt Readings from Last 3 Encounters:   07/17/24 71.1 kg (156 lb 12.8 oz)   05/23/24 71.7 kg (158 lb)   05/09/24 71.7 kg (158 lb)     Ht Readings from Last 1 Encounters:   07/17/24 157.5 cm (62\")     Body mass index is 28.68 kg/m².  Physical Exam  Constitutional:       Appearance: She is well-developed. She is not diaphoretic.   HENT:      Head: Normocephalic and atraumatic.   Eyes:      General: No scleral icterus.     Conjunctiva/sclera: Conjunctivae normal.      Pupils: Pupils are equal, round, and reactive to light.   Neck:      Thyroid: No thyromegaly.   Cardiovascular:      Rate and Rhythm: Normal rate and regular rhythm.      Heart sounds: Normal heart sounds. No murmur heard.     No gallop.   Pulmonary:      Effort: Pulmonary effort is normal.      Breath sounds: Normal breath sounds. No wheezing or rales.   Abdominal:      General: Bowel sounds are normal. There is no distension or abdominal bruit.      Palpations: Abdomen is soft. There is no shifting dullness, fluid wave or mass.      Tenderness: There is no abdominal tenderness. There is no guarding. Negative signs include Farmer's " sign.      Hernia: There is no hernia in the ventral area.   Musculoskeletal:         General: Normal range of motion.      Cervical back: Normal range of motion and neck supple.   Lymphadenopathy:      Cervical: No cervical adenopathy.   Skin:     General: Skin is warm and dry.      Findings: No erythema or rash.   Neurological:      Mental Status: She is alert and oriented to person, place, and time.   Psychiatric:         Mood and Affect: Mood normal.         Behavior: Behavior normal.         CLINICAL DATA REVIEWED   reviewed previous lab results and integrated with today's visit, reviewed notes from other physicians and/or last GI encounter, reviewed previous endoscopy results and available photos, reviewed surgical pathology results from previous biopsies    ASSESSMENT  Diagnoses and all orders for this visit:    Gastroesophageal reflux disease with esophagitis without hemorrhage  -     pantoprazole (PROTONIX) 40 MG EC tablet; Take 1 tablet by mouth 2 (Two) Times a Day Before Meals.          PLAN  Return in about 1 year (around 7/17/2025).    I have discussed the above plan with the patient.  They verbalize understanding and are in agreement with the plan.  They have been advised to contact the office for any questions, concerns, or changes related to their health.

## 2024-08-05 ENCOUNTER — HOSPITAL ENCOUNTER (OUTPATIENT)
Dept: MAMMOGRAPHY | Facility: HOSPITAL | Age: 44
Discharge: HOME | End: 2024-08-05
Admitting: OBSTETRICS & GYNECOLOGY
Payer: COMMERCIAL

## 2024-08-05 ENCOUNTER — TELEPHONE (OUTPATIENT)
Dept: GASTROENTEROLOGY | Facility: CLINIC | Age: 44
End: 2024-08-05
Payer: COMMERCIAL

## 2024-08-05 DIAGNOSIS — Z12.31 ENCOUNTER FOR SCREENING MAMMOGRAM FOR MALIGNANT NEOPLASM OF BREAST: ICD-10-CM

## 2024-08-05 PROCEDURE — 77067 SCR MAMMO BI INCL CAD: CPT | Performed by: RADIOLOGY

## 2024-08-05 PROCEDURE — 77067 SCR MAMMO BI INCL CAD: CPT

## 2024-08-05 PROCEDURE — 77063 BREAST TOMOSYNTHESIS BI: CPT | Performed by: RADIOLOGY

## 2024-08-05 PROCEDURE — 77063 BREAST TOMOSYNTHESIS BI: CPT

## 2024-08-06 NOTE — TELEPHONE ENCOUNTER
CaseId:[id number];Status:Approved;Review Type:Qty;Coverage Start Date:08/05/2024;Coverage End Date:08/05/2025;

## 2024-10-14 ENCOUNTER — OFFICE VISIT (OUTPATIENT)
Dept: OBSTETRICS AND GYNECOLOGY | Facility: CLINIC | Age: 44
End: 2024-10-14
Payer: COMMERCIAL

## 2024-10-14 VITALS
BODY MASS INDEX: 28.52 KG/M2 | SYSTOLIC BLOOD PRESSURE: 108 MMHG | DIASTOLIC BLOOD PRESSURE: 74 MMHG | WEIGHT: 155 LBS | HEIGHT: 62 IN

## 2024-10-14 DIAGNOSIS — N95.1 MENOPAUSAL SYMPTOMS: ICD-10-CM

## 2024-10-14 DIAGNOSIS — R10.2 PELVIC PAIN: ICD-10-CM

## 2024-10-14 DIAGNOSIS — F32.A ANXIETY AND DEPRESSION: ICD-10-CM

## 2024-10-14 DIAGNOSIS — F41.9 ANXIETY AND DEPRESSION: ICD-10-CM

## 2024-10-14 DIAGNOSIS — R31.9 HEMATURIA, UNSPECIFIED TYPE: ICD-10-CM

## 2024-10-14 DIAGNOSIS — Z13.89 SCREENING FOR GENITOURINARY CONDITION: Primary | ICD-10-CM

## 2024-10-14 LAB
BILIRUB BLD-MCNC: NEGATIVE MG/DL
CLARITY, POC: CLEAR
COLOR UR: YELLOW
GLUCOSE UR STRIP-MCNC: NEGATIVE MG/DL
KETONES UR QL: NEGATIVE
LEUKOCYTE EST, POC: NEGATIVE
NITRITE UR-MCNC: NEGATIVE MG/ML
PH UR: 5 [PH] (ref 5–8)
PROT UR STRIP-MCNC: ABNORMAL MG/DL
RBC # UR STRIP: ABNORMAL /UL
SP GR UR: 1.01 (ref 1–1.03)
UROBILINOGEN UR QL: NORMAL

## 2024-10-14 PROCEDURE — 99214 OFFICE O/P EST MOD 30 MIN: CPT | Performed by: OBSTETRICS & GYNECOLOGY

## 2024-10-14 PROCEDURE — 81002 URINALYSIS NONAUTO W/O SCOPE: CPT | Performed by: OBSTETRICS & GYNECOLOGY

## 2024-10-14 RX ORDER — FLUTICASONE PROPIONATE 50 UG/1
2 SPRAY, METERED NASAL DAILY
COMMUNITY
Start: 2024-08-16

## 2024-10-14 RX ORDER — ESCITALOPRAM OXALATE 10 MG/1
10 TABLET ORAL DAILY
Qty: 30 TABLET | Refills: 2 | Status: SHIPPED | OUTPATIENT
Start: 2024-10-14

## 2024-10-14 RX ORDER — BUSPIRONE HYDROCHLORIDE 15 MG/1
15 TABLET ORAL 2 TIMES DAILY PRN
COMMUNITY
Start: 2024-08-16

## 2024-10-14 RX ORDER — ALBUTEROL SULFATE 90 UG/1
INHALANT RESPIRATORY (INHALATION)
COMMUNITY
Start: 2024-08-16

## 2024-10-14 NOTE — PROGRESS NOTES
"      Marilee Kraus is a 44 y.o. patient who presents for follow up of   Chief Complaint   Patient presents with    Ovarian Cyst     45 yo est pt here for pelvic pain.  She had a TLH/BS with OC in 8/2023 for cervical dysplasia and her final past was normal. Her pap in 5/2024 was LGSIl with neg HPV. About 3 weeks ago she noticed bilateral pelvic pain, worse on the left. She also noticed some bloating. The pain has improved but is still there on occasion. She is also noticing decrease in motivation, anxiety and tearfulness. She has some HF and her kids have noticed the house is \"too cold\". Her US today shows a surgically absent uterus.  Her ovaries appear normal.  There are multiple bowel loops noted throughout the pelvis.  Her ultrasound was compared to her CT scan on 9/9/2023.  She does have significant blood in her urine today.  We discussed that if her UA and culture do not show UTI, we may then decide to proceed with a CT scan to rule out stones.  She does have history of gastritis and is following up with Dr. Tubbs.. We discussed perimenopausal changes and treatment options including SSRI, Veozah and HRT. She is using  Buspar but does not think it helps. We discussed starting Lexapro and she is agreeable.         The following portions of the patient's history were reviewed and updated as appropriate: allergies, current medications and problem list.    Review of Systems   Constitutional:  Positive for activity change.   Gastrointestinal:  Positive for abdominal pain.   Endocrine: Positive for heat intolerance.   Genitourinary:  Positive for pelvic pain.   Psychiatric/Behavioral:  Positive for dysphoric mood. The patient is nervous/anxious.    All other systems reviewed and are negative.      /74   Ht 157.5 cm (62\")   Wt 70.3 kg (155 lb)   LMP 07/19/2023 (Exact Date)   BMI 28.35 kg/m²     Physical Exam  Vitals and nursing note reviewed.   Constitutional:       Appearance: Normal appearance. " She is well-developed.   HENT:      Head: Normocephalic and atraumatic.   Eyes:      General: No scleral icterus.     Conjunctiva/sclera: Conjunctivae normal.   Neck:      Thyroid: No thyromegaly.   Cardiovascular:      Rate and Rhythm: Normal rate and regular rhythm.   Pulmonary:      Effort: Pulmonary effort is normal.      Breath sounds: Normal breath sounds.   Abdominal:      General: Bowel sounds are normal. There is no distension.      Palpations: Abdomen is soft. There is no mass.      Tenderness: There is no abdominal tenderness. There is no guarding or rebound.      Hernia: No hernia is present.   Skin:     General: Skin is warm and dry.   Neurological:      Mental Status: She is alert and oriented to person, place, and time.   Psychiatric:         Mood and Affect: Mood normal.         Behavior: Behavior normal.         Thought Content: Thought content normal.         Judgment: Judgment normal.         A/P:  1. Pelvic pain- normal pelvic US. Low threshold for CT scan.   2. Hematuria- check UA and CS   3. Menopausal symptoms- check TSH, FSH and E2.   4. Anxiety and depression- ERX Lexapro 10 mg.   5. Pap UTD 5/2024- LGSIL neg HPV. Repeat pap 11/2024.   6. RTO 6 weeks recheck symptoms.   7.   I spent > 30 minutes on the separately reported service of E& M.  This time includes time spent by me in the following activities: preparing for the visit, reviewing tests, obtaining and/or reviewing a separately obtained history, performing a medically appropriate examination and/or evaluation, counseling and educating the patient/family/caregiver, ordering medications, tests, or procedures, referring and communicating with other health care professionals, documenting information in the medical record, independently interpreting results and communicating that information with the patient/family/caregiver and care coordination. This time is not included in the time used to interpret the ultrasound also reported  today.      Assessment & Plan   Diagnoses and all orders for this visit:    1. Screening for genitourinary condition (Primary)  -     POC Urinalysis Dipstick  -     Cancel: POC Pregnancy, Urine    2. Hematuria, unspecified type  -     Urine Culture - Urine, Urine, Random Void  -     Urinalysis With Microscopic - Urine, Random Void    3. Pelvic pain    4. Menopausal symptoms  -     Follicle Stimulating Hormone  -     TSH  -     Estradiol    5. Anxiety and depression    Other orders  -     escitalopram (Lexapro) 10 MG tablet; Take 1 tablet by mouth Daily.  Dispense: 30 tablet; Refill: 2                 No follow-ups on file.      Sarah Berger MD    10/14/2024  13:13 EDT

## 2024-10-15 LAB
ESTRADIOL SERPL-MCNC: 46.5 PG/ML
FSH SERPL-ACNC: 4.9 MIU/ML
TSH SERPL DL<=0.005 MIU/L-ACNC: 1.1 UIU/ML (ref 0.27–4.2)

## 2024-10-16 LAB
APPEARANCE UR: CLEAR
BACTERIA #/AREA URNS HPF: NORMAL /HPF
BACTERIA UR CULT: NO GROWTH
BACTERIA UR CULT: NORMAL
BILIRUB UR QL STRIP: NEGATIVE
CASTS URNS MICRO: NORMAL
COLOR UR: YELLOW
EPI CELLS #/AREA URNS HPF: NORMAL /HPF
GLUCOSE UR QL STRIP: NEGATIVE
HGB UR QL STRIP: NEGATIVE
KETONES UR QL STRIP: NEGATIVE
LEUKOCYTE ESTERASE UR QL STRIP: NEGATIVE
NITRITE UR QL STRIP: NEGATIVE
PH UR STRIP: 5.5 [PH] (ref 5–8)
PROT UR QL STRIP: NEGATIVE
RBC #/AREA URNS HPF: NORMAL /HPF
SP GR UR STRIP: 1.01 (ref 1–1.03)
UROBILINOGEN UR STRIP-MCNC: NORMAL MG/DL
WBC #/AREA URNS HPF: NORMAL /HPF

## 2024-10-17 NOTE — PROGRESS NOTES
PIP= no UTI, no blood in urine under microscope. Her labs do not indicate menopause. Her thyroid is normal.

## 2024-10-23 ENCOUNTER — HOSPITAL ENCOUNTER (EMERGENCY)
Facility: HOSPITAL | Age: 44
Discharge: HOME | End: 2024-10-23
Attending: EMERGENCY MEDICINE | Admitting: EMERGENCY MEDICINE
Payer: COMMERCIAL

## 2024-10-23 VITALS
DIASTOLIC BLOOD PRESSURE: 63 MMHG | WEIGHT: 153 LBS | SYSTOLIC BLOOD PRESSURE: 95 MMHG | RESPIRATION RATE: 16 BRPM | TEMPERATURE: 97.9 F | HEART RATE: 69 BPM | HEIGHT: 63 IN | BODY MASS INDEX: 27.11 KG/M2 | OXYGEN SATURATION: 99 %

## 2024-10-23 DIAGNOSIS — R11.2 NAUSEA AND VOMITING, UNSPECIFIED VOMITING TYPE: ICD-10-CM

## 2024-10-23 DIAGNOSIS — R51.9 ACUTE NONINTRACTABLE HEADACHE, UNSPECIFIED HEADACHE TYPE: Primary | ICD-10-CM

## 2024-10-23 LAB
ALBUMIN SERPL-MCNC: 4.6 G/DL (ref 3.5–5.2)
ALBUMIN/GLOB SERPL: 1.4 G/DL
ALP SERPL-CCNC: 75 U/L (ref 39–117)
ALT SERPL W P-5'-P-CCNC: 35 U/L (ref 1–33)
ANION GAP SERPL CALCULATED.3IONS-SCNC: 9.6 MMOL/L (ref 5–15)
AST SERPL-CCNC: 36 U/L (ref 1–32)
BASOPHILS # BLD AUTO: 0.03 10*3/MM3 (ref 0–0.2)
BASOPHILS NFR BLD AUTO: 0.3 % (ref 0–1.5)
BILIRUB SERPL-MCNC: 0.3 MG/DL (ref 0–1.2)
BILIRUB UR QL STRIP: NEGATIVE
BUN SERPL-MCNC: 10 MG/DL (ref 6–20)
BUN/CREAT SERPL: 16.1 (ref 7–25)
CALCIUM SPEC-SCNC: 9.5 MG/DL (ref 8.6–10.5)
CHLORIDE SERPL-SCNC: 105 MMOL/L (ref 98–107)
CLARITY UR: CLEAR
CO2 SERPL-SCNC: 26.4 MMOL/L (ref 22–29)
COLOR UR: YELLOW
CREAT SERPL-MCNC: 0.62 MG/DL (ref 0.57–1)
DEPRECATED RDW RBC AUTO: 42.5 FL (ref 37–54)
EGFRCR SERPLBLD CKD-EPI 2021: 113.5 ML/MIN/1.73
EOSINOPHIL # BLD AUTO: 0.03 10*3/MM3 (ref 0–0.4)
EOSINOPHIL NFR BLD AUTO: 0.3 % (ref 0.3–6.2)
ERYTHROCYTE [DISTWIDTH] IN BLOOD BY AUTOMATED COUNT: 12.1 % (ref 12.3–15.4)
FLUAV RNA RESP QL NAA+PROBE: NOT DETECTED
FLUBV RNA RESP QL NAA+PROBE: NOT DETECTED
GLOBULIN UR ELPH-MCNC: 3.3 GM/DL
GLUCOSE SERPL-MCNC: 90 MG/DL (ref 65–99)
GLUCOSE UR STRIP-MCNC: NEGATIVE MG/DL
HCT VFR BLD AUTO: 43 % (ref 34–46.6)
HGB BLD-MCNC: 13.9 G/DL (ref 12–15.9)
HGB UR QL STRIP.AUTO: NEGATIVE
IMM GRANULOCYTES # BLD AUTO: 0.03 10*3/MM3 (ref 0–0.05)
IMM GRANULOCYTES NFR BLD AUTO: 0.3 % (ref 0–0.5)
KETONES UR QL STRIP: NEGATIVE
LEUKOCYTE ESTERASE UR QL STRIP.AUTO: NEGATIVE
LIPASE SERPL-CCNC: 22 U/L (ref 13–60)
LYMPHOCYTES # BLD AUTO: 1.76 10*3/MM3 (ref 0.7–3.1)
LYMPHOCYTES NFR BLD AUTO: 19.3 % (ref 19.6–45.3)
MCH RBC QN AUTO: 31 PG (ref 26.6–33)
MCHC RBC AUTO-ENTMCNC: 32.3 G/DL (ref 31.5–35.7)
MCV RBC AUTO: 95.8 FL (ref 79–97)
MONOCYTES # BLD AUTO: 0.58 10*3/MM3 (ref 0.1–0.9)
MONOCYTES NFR BLD AUTO: 6.4 % (ref 5–12)
NEUTROPHILS NFR BLD AUTO: 6.67 10*3/MM3 (ref 1.7–7)
NEUTROPHILS NFR BLD AUTO: 73.4 % (ref 42.7–76)
NITRITE UR QL STRIP: NEGATIVE
NRBC BLD AUTO-RTO: 0 /100 WBC (ref 0–0.2)
PH UR STRIP.AUTO: 6.5 [PH] (ref 4.5–8)
PLATELET # BLD AUTO: 309 10*3/MM3 (ref 140–450)
PMV BLD AUTO: 10.6 FL (ref 6–12)
POTASSIUM SERPL-SCNC: 4.6 MMOL/L (ref 3.5–5.2)
PROT SERPL-MCNC: 7.9 G/DL (ref 6–8.5)
PROT UR QL STRIP: NEGATIVE
RBC # BLD AUTO: 4.49 10*6/MM3 (ref 3.77–5.28)
SARS-COV-2 RNA RESP QL NAA+PROBE: NOT DETECTED
SODIUM SERPL-SCNC: 141 MMOL/L (ref 136–145)
SP GR UR STRIP: 1.02 (ref 1–1.03)
UROBILINOGEN UR QL STRIP: NORMAL
WBC NRBC COR # BLD AUTO: 9.1 10*3/MM3 (ref 3.4–10.8)

## 2024-10-23 PROCEDURE — 80053 COMPREHEN METABOLIC PANEL: CPT | Performed by: EMERGENCY MEDICINE

## 2024-10-23 PROCEDURE — 83690 ASSAY OF LIPASE: CPT | Performed by: EMERGENCY MEDICINE

## 2024-10-23 PROCEDURE — 25810000003 LACTATED RINGERS SOLUTION: Performed by: EMERGENCY MEDICINE

## 2024-10-23 PROCEDURE — 81003 URINALYSIS AUTO W/O SCOPE: CPT | Performed by: EMERGENCY MEDICINE

## 2024-10-23 PROCEDURE — 87636 SARSCOV2 & INF A&B AMP PRB: CPT | Performed by: EMERGENCY MEDICINE

## 2024-10-23 PROCEDURE — 85025 COMPLETE CBC W/AUTO DIFF WBC: CPT | Performed by: EMERGENCY MEDICINE

## 2024-10-23 PROCEDURE — 25010000002 PROCHLORPERAZINE 10 MG/2ML SOLUTION: Performed by: EMERGENCY MEDICINE

## 2024-10-23 PROCEDURE — 96374 THER/PROPH/DIAG INJ IV PUSH: CPT

## 2024-10-23 PROCEDURE — 99283 EMERGENCY DEPT VISIT LOW MDM: CPT | Performed by: EMERGENCY MEDICINE

## 2024-10-23 RX ORDER — BUTALBITAL, ACETAMINOPHEN AND CAFFEINE 50; 325; 40 MG/1; MG/1; MG/1
2 TABLET ORAL EVERY 6 HOURS PRN
Qty: 20 TABLET | Refills: 0 | Status: SHIPPED | OUTPATIENT
Start: 2024-10-23

## 2024-10-23 RX ORDER — DEXLANSOPRAZOLE 60 MG/1
60 CAPSULE, DELAYED RELEASE ORAL DAILY
COMMUNITY

## 2024-10-23 RX ORDER — SODIUM CHLORIDE 0.9 % (FLUSH) 0.9 %
10 SYRINGE (ML) INJECTION AS NEEDED
Status: DISCONTINUED | OUTPATIENT
Start: 2024-10-23 | End: 2024-10-23 | Stop reason: HOSPADM

## 2024-10-23 RX ORDER — PROMETHAZINE HYDROCHLORIDE 25 MG/1
25 TABLET ORAL EVERY 8 HOURS PRN
Qty: 20 TABLET | Refills: 0 | Status: SHIPPED | OUTPATIENT
Start: 2024-10-23

## 2024-10-23 RX ORDER — ONDANSETRON 4 MG/1
4 TABLET, ORALLY DISINTEGRATING ORAL EVERY 8 HOURS PRN
Qty: 20 TABLET | Refills: 0 | Status: SHIPPED | OUTPATIENT
Start: 2024-10-23

## 2024-10-23 RX ORDER — PROCHLORPERAZINE EDISYLATE 5 MG/ML
10 INJECTION INTRAMUSCULAR; INTRAVENOUS ONCE
Status: COMPLETED | OUTPATIENT
Start: 2024-10-23 | End: 2024-10-23

## 2024-10-23 RX ADMIN — PROCHLORPERAZINE EDISYLATE 10 MG: 5 INJECTION INTRAMUSCULAR; INTRAVENOUS at 11:28

## 2024-10-23 RX ADMIN — SODIUM CHLORIDE, POTASSIUM CHLORIDE, SODIUM LACTATE AND CALCIUM CHLORIDE 500 ML: 600; 310; 30; 20 INJECTION, SOLUTION INTRAVENOUS at 11:28

## 2024-10-23 RX ADMIN — SODIUM CHLORIDE, POTASSIUM CHLORIDE, SODIUM LACTATE AND CALCIUM CHLORIDE 500 ML: 600; 310; 30; 20 INJECTION, SOLUTION INTRAVENOUS at 12:26

## 2024-10-23 NOTE — Clinical Note
BHARATHI RILEY  [facility] EMERGENCY DEPARTMENT  [address] [place] [address]  Phone: [phone]    Marilee Kraus was seen and treated in our emergency department on 10/23/2024.  She may return to work on 10/25/2024.         Thank you for choosing [facility].    Brian Jara MD

## 2024-10-23 NOTE — Clinical Note
BHARATHI RILEY  [facility] EMERGENCY DEPARTMENT  [address] [place] [address]  Phone: [phone]    Luis Felipe Kraus accompanied Marilee Stapleton Kraus to the emergency department on 10/23/2024. They may return to work on 10/24/2024.        Thank you for choosing [facility].    Brian Jara MD

## 2024-10-23 NOTE — ED PROVIDER NOTES
Subjective   History of Present Illness  Patient presents complaining of a 5-day episode of a dull headache.  Patient says that overnight it got worse.  Patient woke up with pain in her frontal region and behind her eyes.  Patient's also had some nausea and vomiting associated with it but no abdominal pain.  No therapy taken prior to arrival.  Nothing seems to make it better or worse.  Patient denies any vision changes, ringing in her ears, dizziness, near syncope, chest pain, shortness of breath, or fever.  Patient denies any recent travel, sick contacts, bad food exposure, or trauma.  Patient did vomit twice but denies any blood.  No recent diarrhea or constipation.      Review of Systems   All other systems reviewed and are negative.      Past Medical History:   Diagnosis Date    Abnormal Pap smear of cervix 2018    ASCUS + HPV =- no follow up    Anxiety     Asthma     Cervical dysplasia     RUMA 2 on bx, neg LEEP, RUMA 3 on CKC, neg margins    Depression     GERD (gastroesophageal reflux disease)     S/P LEEP 2020       Allergies   Allergen Reactions    Amoxapine Itching    Amoxicillin Itching       Past Surgical History:   Procedure Laterality Date    CERVICAL BIOPSY  W/ LOOP ELECTRODE EXCISION      CERVICAL CONIZATION N/A 2022    Procedure: CERVICAL CONIZATION;  Surgeon: Sarah Berger MD;  Location: [facility];  Service: Obstetrics/Gynecology;  Laterality: N/A;     SECTION      placenta previa     SECTION       SECTION      ENDOSCOPY N/A 2021    Procedure: ESOPHAGOGASTRODUODENOSCOPY with biopsies;  Surgeon: Lee Tubbs MD;  Location: [facility] OR;  Service: Gastroenterology;  Laterality: N/A;  Reflux  Biopsies: gastric, distal esophagus    ENDOSCOPY N/A 2024    Procedure: ESOPHAGOGASTRODUODENOSCOPY WITH BIOPSY;  Surgeon: Lee Tubbs MD;  Location: [facility] OR;  Service: Gastroenterology;  Laterality: N/A;  gastritis-  gastric biopsy, esophagitis-distal esophagus biopsy    LEEP N/A 09/29/2020    Procedure: LOOP ELECTROCAUTERY EXCISION PROCEDURE;  Surgeon: Sarah Berger MD;  Location: [facility] OR;  Service: Obstetrics/Gynecology;  Laterality: N/A;    TOTAL LAPAROSCOPIC HYSTERECTOMY SALPINGECTOMY N/A 8/15/2023    Procedure: TOTAL LAPAROSCOPIC HYSTERECTOMY , bilateral salpingectomy, lysis of adhesions;  Surgeon: Sarah Berger MD;  Location: [facility] OR;  Service: Obstetrics/Gynecology;  Laterality: N/A;       Family History   Problem Relation Age of Onset    Breast cancer Neg Hx     Ovarian cancer Neg Hx     Uterine cancer Neg Hx     Colon cancer Neg Hx     Prostate cancer Neg Hx     Deep vein thrombosis Neg Hx     Malig Hyperthermia Neg Hx        Social History     Socioeconomic History    Marital status:     Number of children: 2   Tobacco Use    Smoking status: Never    Smokeless tobacco: Never   Vaping Use    Vaping status: Never Used   Substance and Sexual Activity    Alcohol use: Never    Drug use: Never    Sexual activity: Not Currently     Partners: Male     Birth control/protection: Hysterectomy     Comment: 8/2023- TLH/BS with OC           Objective   Physical Exam  Vitals and nursing note reviewed. Chaperone present: Patient sitting in bed comfortably, talkative, friendly, no signs of distress.  Cooperative with exam..   HENT:      Head: Normocephalic and atraumatic.      Right Ear: External ear normal.      Left Ear: External ear normal.      Nose: Nose normal.      Mouth/Throat:      Mouth: Mucous membranes are moist.      Pharynx: Oropharynx is clear. No oropharyngeal exudate or posterior oropharyngeal erythema.   Eyes:      Extraocular Movements: Extraocular movements intact.      Conjunctiva/sclera: Conjunctivae normal.      Pupils: Pupils are equal, round, and reactive to light.   Cardiovascular:      Rate and Rhythm: Normal rate and regular rhythm.      Pulses:           Radial pulses are  2+ on the right side and 2+ on the left side.        Dorsalis pedis pulses are 2+ on the right side and 2+ on the left side.        Posterior tibial pulses are 2+ on the right side and 2+ on the left side.      Heart sounds: Normal heart sounds.   Pulmonary:      Effort: Pulmonary effort is normal.      Breath sounds: Normal breath sounds.   Abdominal:      General: Abdomen is flat. Bowel sounds are normal. There is no distension.      Palpations: Abdomen is soft.      Tenderness: There is no abdominal tenderness. There is no right CVA tenderness, left CVA tenderness or guarding.   Musculoskeletal:      Cervical back: Normal range of motion and neck supple. No rigidity.      Right lower leg: No edema.      Left lower leg: No edema.   Skin:     General: Skin is warm and dry.      Capillary Refill: Capillary refill takes 2 to 3 seconds.   Neurological:      Mental Status: She is alert and oriented to person, place, and time.      Sensory: No sensory deficit.      Motor: No weakness.      Coordination: Coordination normal.      Gait: Gait normal.   Psychiatric:         Mood and Affect: Mood normal.         Behavior: Behavior normal.         Procedures           ED Course                                               Medical Decision Making  Ddx migraine, cluster, tension, sinusitis, upper respiratory infection, viral syndrome, gastritis, gastroenteritis, hiatal hernia    Labs Reviewed  COMPREHENSIVE METABOLIC PANEL - Abnormal; Notable for the following components:     ALT (SGPT)                    35 (*)                 AST (SGOT)                    36 (*)              All other components within normal limits         Narrative: GFR Normal >60                  Chronic Kidney Disease <60                  Kidney Failure <15                    CBC WITH AUTO DIFFERENTIAL - Abnormal; Notable for the following components:     RDW                           12.1 (*)               Lymphocyte %                  19.3 (*)             All other components within normal limits  COVID-19 AND FLU A/B, NP SWAB IN TRANSPORT MEDIA 1 HR TAT - Normal         Narrative: Fact sheet for providers: https://www.fda.gov/media/052068/download                                    Fact sheet for patients: https://www.fda.gov/media/627116/download                                    Test performed by PCR.  LIPASE - Normal  URINALYSIS W/ MICROSCOPIC IF INDICATED (NO CULTURE) - Normal         Narrative: Urine microscopic not indicated.  COVID PRE-OP / PRE-PROCEDURE SCREENING ORDER (NO ISOLATION)         Narrative: The following orders were created for panel order COVID PRE-OP / PRE-PROCEDURE SCREENING ORDER (NO ISOLATION) - Swab, Nasopharynx.                  Procedure                               Abnormality         Status                                     ---------                               -----------         ------                                     COVID-19 and FLU A/B PCR...[[id number]]  Normal              Final result                                                 Please view results for these tests on the individual orders.  CBC AND DIFFERENTIAL    1220 Pt seen again prior to d/c.  Labs/Imaging reviewed and are unremarkable.  Symptoms improved and pt feels better, vitals stable and pt. in NAD. Non-toxic. Comfortable. Ambulating without difficulty.  Tolerating po.  Relaxed breathing.  All questions personally answered at the bedside and all d/c instructions personally reviewed with pt.  Discussed the importance of close outpt. f/u and pt. understands this and agrees to do so.  Pt agrees to return to ED immediately for any new, persistent, or worsening symptoms.    EMR Dragon/Transcription disclaimer:  Much of this encounter note is an electronic transcription/translation of spoken language to printed text, aka voice recognition.  The electronic translation of spoken language may permit erroneous or at times nonsensical words or phrases to be  inadvertently transcribed; although I have reviewed the note for such errors, some may still exist so please interpret based on surrounding text content.      Problems Addressed:  Acute nonintractable headache, unspecified headache type: complicated acute illness or injury  Nausea and vomiting, unspecified vomiting type: complicated acute illness or injury    Amount and/or Complexity of Data Reviewed  Labs: ordered.    Risk  Prescription drug management.        Final diagnoses:   Acute nonintractable headache, unspecified headache type   Nausea and vomiting, unspecified vomiting type       ED Disposition  ED Disposition       ED Disposition   Discharge    Condition   Stable    Comment   --               PCP    In 2 days  If symptoms worsen    [facility] [place] [address]  [phone]  In 2 days           Medication List        New Prescriptions      butalbital-acetaminophen-caffeine -40 MG per tablet  Commonly known as: FIORICET, ESGIC  Take 2 tablets by mouth Every 6 (Six) Hours As Needed for Headache.     ondansetron ODT 4 MG disintegrating tablet  Commonly known as: ZOFRAN-ODT  Place 1 tablet on the tongue Every 8 (Eight) Hours As Needed for Nausea or Vomiting.     promethazine 25 MG tablet  Commonly known as: PHENERGAN  Take 1 tablet by mouth Every 8 (Eight) Hours As Needed for Nausea or Vomiting.               Where to Get Your Medications        These medications were sent to [facility] - [place], [place] - [address] [place] [address] - [phone]  - [phone]   [address] [facility] [address]      Phone: [phone]   butalbital-acetaminophen-caffeine -40 MG per tablet  ondansetron ODT 4 MG disintegrating tablet  promethazine 25 MG tablet            Brian Jara MD  10/23/24 3224     Attending Only

## 2024-11-06 ENCOUNTER — OFFICE VISIT (OUTPATIENT)
Dept: GASTROENTEROLOGY | Facility: CLINIC | Age: 44
End: 2024-11-06
Payer: COMMERCIAL

## 2024-11-06 ENCOUNTER — LAB (OUTPATIENT)
Dept: LAB | Facility: HOSPITAL | Age: 44
End: 2024-11-06
Payer: COMMERCIAL

## 2024-11-06 VITALS
WEIGHT: 152.2 LBS | DIASTOLIC BLOOD PRESSURE: 80 MMHG | BODY MASS INDEX: 26.97 KG/M2 | HEIGHT: 63 IN | SYSTOLIC BLOOD PRESSURE: 110 MMHG

## 2024-11-06 DIAGNOSIS — K13.70 ORAL LESION: ICD-10-CM

## 2024-11-06 DIAGNOSIS — K13.70 ORAL LESION: Primary | ICD-10-CM

## 2024-11-06 DIAGNOSIS — K21.00 GASTROESOPHAGEAL REFLUX DISEASE WITH ESOPHAGITIS WITHOUT HEMORRHAGE: ICD-10-CM

## 2024-11-06 PROCEDURE — 36415 COLL VENOUS BLD VENIPUNCTURE: CPT

## 2024-11-06 PROCEDURE — 86695 HERPES SIMPLEX TYPE 1 TEST: CPT

## 2024-11-06 PROCEDURE — 86696 HERPES SIMPLEX TYPE 2 TEST: CPT

## 2024-11-06 NOTE — PROGRESS NOTES
PATIENT INFORMATION  Marilee Kraus       - 1980    CHIEF COMPLAINT  Chief Complaint   Patient presents with    Abdominal Pain    Constipation    Heartburn       HISTORY OF PRESENT ILLNESS  Here today for early follow-up    For the last 2 months she is having issues with irritation in throat and mouth. Every day for the last 2 weeks she has had a lot of trouble with burning in mouth, it hurts to speak, talk, etc.  2 weeks ago when to ER and was given mouth rinse. She reports sores on L tongue cannot visualize on exam, patient questions whether there are other tests. Starts with blisters, but then pain constant, sensation of burning is constant every day for 2 weeks.    She feels this is something that is not related to gastro, stomach is not upset when eating, this feels like a different problem    Saw provider which started on lexapro, started on 1 mos with no relief. Will r/o HSV and send to ENT for eval. 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 She has fillings, she is up to date with dentist 1 mos ago, 1st feeling 19 yrs ago. Last filling 1 yr ago and on same side as pain, burning started 2-3 months after.    Comes and goes, when taking mouth wash it will improve for a week and then come back.    GERD: Managed on BID PPI, doing well at last follow-up. Has not stopped at all.    2024 EGD for dyspepsia with reactive gastropathy, reflux esophagitis, no HP or barretts.      REVIEWED PERTINENT RESULTS/ LABS  Lab Results   Component Value Date    CASEREPORT  2024     Surgical Pathology Report                         Case: [id number]                                  Authorizing Provider:  Lee Tubbs        Collected:           2024 04:22 PM                                 MD Clement                                                                   Ordering Location:     [facility]   Received:            2024 05:06 PM                                  OR                                                                           Pathologist:           Shruti Menjivar MD                                                          Specimens:   1) - Stomach, gastric biopsy                                                                        2) - Esophagus, Distal, distal esophagus biopsy                                            FINALDX  05/09/2024     1.  Stomach, biopsy: Antral and oxyntic type gastric mucosa with   A. Mild reactive/chemical gastropathy; no significant inflammation.   B. No metaplasia, dysplasia nor malignancy.   C. No H. pylori-like organisms identified.    2.  Esophagus, distal, biopsy: Squamoglandular mucosa and submucosa with    A. No significant eosinophilia.    B. No definitive well-developed goblet cell metaplasia identified by routine staining.   C. No dysplasia nor malignancy.                 D. No viral inclusions nor fungal organisms identified.       Lab Results   Component Value Date    HGB 13.9 10/23/2024    MCV 95.8 10/23/2024     10/23/2024    ALT 35 (H) 10/23/2024    AST 36 (H) 10/23/2024    FERRITIN 229.00 (H) 12/11/2021    IRON 81 04/25/2022    TIBC 422 04/25/2022      US Non-ob Transvaginal    Result Date: 10/18/2024  Narrative: 10/14/2024 Her US today shows a surgically absent uterus.  Her ovaries appear normal.  There are multiple bowel loops noted throughout the pelvis.  Her ultrasound was compared to her CT scan on 9/9/2023. Ultrasound images and report reviewed personally by me.  Full interpretation of ultrasound can be found in the patient's note on the same date of service. Sarah Berger MD        REVIEW OF SYSTEMS  Review of Systems   Constitutional:  Negative for activity change, chills, fever and unexpected weight change.   HENT:  Positive for trouble swallowing. Negative for congestion.         Oral swelling/tongue burning   Eyes:  Negative for visual disturbance.   Respiratory:   Negative for shortness of breath.    Cardiovascular:  Negative for chest pain and palpitations.   Gastrointestinal:  Positive for abdominal distention, abdominal pain, anal bleeding, constipation, nausea and vomiting. Negative for blood in stool.        Reflux   Endocrine: Negative for cold intolerance and heat intolerance.   Genitourinary:  Negative for hematuria.   Musculoskeletal:  Negative for gait problem.   Skin:  Negative for color change.   Allergic/Immunologic: Negative for immunocompromised state.   Neurological:  Negative for weakness and light-headedness.   Hematological:  Negative for adenopathy.   Psychiatric/Behavioral:  Negative for sleep disturbance. The patient is not nervous/anxious.          ACTIVE PROBLEMS  Patient Active Problem List    Diagnosis     S/P conization of cervix [Z98.890]     Chronic idiopathic constipation [K59.04]     Helicobacter pylori gastritis [K29.70, B96.81]     Cytokine release syndrome, grade 1 [D89.831]     Pneumonia due to COVID-19 virus [U07.1, J12.82]     Epigastric pain [R10.13]     S/P LEEP [Z98.890]     Dysplasia of cervix, high grade RUMA 2 [N87.1]     Breast pain [N64.4]          PAST MEDICAL HISTORY  Past Medical History:   Diagnosis Date    Abnormal Pap smear of cervix 2018    ASCUS + HPV =- no follow up    Anxiety     Asthma     Cervical dysplasia     RUMA 2 on bx, neg LEEP, RUMA 3 on CKC, neg margins    Depression     GERD (gastroesophageal reflux disease)     S/P LEEP 2020         SURGICAL HISTORY  Past Surgical History:   Procedure Laterality Date    CERVICAL BIOPSY  W/ LOOP ELECTRODE EXCISION      CERVICAL CONIZATION N/A 2022    Procedure: CERVICAL CONIZATION;  Surgeon: Sarah Berger MD;  Location: [facility];  Service: Obstetrics/Gynecology;  Laterality: N/A;     SECTION      placenta previa     SECTION       SECTION      ENDOSCOPY N/A 2021    Procedure: ESOPHAGOGASTRODUODENOSCOPY with biopsies;   Surgeon: Lee Tubbs MD;  Location: [facility] OR;  Service: Gastroenterology;  Laterality: N/A;  Reflux  Biopsies: gastric, distal esophagus    ENDOSCOPY N/A 5/9/2024    Procedure: ESOPHAGOGASTRODUODENOSCOPY WITH BIOPSY;  Surgeon: Lee Tubbs MD;  Location: [facility] OR;  Service: Gastroenterology;  Laterality: N/A;  gastritis- gastric biopsy, esophagitis-distal esophagus biopsy    LEEP N/A 09/29/2020    Procedure: LOOP ELECTROCAUTERY EXCISION PROCEDURE;  Surgeon: Sarah Berger MD;  Location: [facility] OR;  Service: Obstetrics/Gynecology;  Laterality: N/A;    TOTAL LAPAROSCOPIC HYSTERECTOMY SALPINGECTOMY N/A 8/15/2023    Procedure: TOTAL LAPAROSCOPIC HYSTERECTOMY , bilateral salpingectomy, lysis of adhesions;  Surgeon: Sarah Berger MD;  Location: [facility] OR;  Service: Obstetrics/Gynecology;  Laterality: N/A;         FAMILY HISTORY  Family History   Problem Relation Age of Onset    Breast cancer Neg Hx     Ovarian cancer Neg Hx     Uterine cancer Neg Hx     Colon cancer Neg Hx     Prostate cancer Neg Hx     Deep vein thrombosis Neg Hx     Malig Hyperthermia Neg Hx          SOCIAL HISTORY  Social History     Occupational History    Occupation: unemployed   Tobacco Use    Smoking status: Never    Smokeless tobacco: Never   Vaping Use    Vaping status: Never Used   Substance and Sexual Activity    Alcohol use: Never    Drug use: Never    Sexual activity: Not Currently     Partners: Male     Birth control/protection: Hysterectomy     Comment: 8/2023- TLH/BS with OC         CURRENT MEDICATIONS    Current Outpatient Medications:     albuterol sulfate  (90 Base) MCG/ACT inhaler, , Disp: , Rfl:     busPIRone (BUSPAR) 15 MG tablet, Take 1 tablet by mouth 2 (Two) Times a Day As Needed. for anxiety, Disp: , Rfl:     butalbital-acetaminophen-caffeine (FIORICET, ESGIC) -40 MG per tablet, Take 2 tablets by mouth Every 6 (Six) Hours As Needed for Headache., Disp: 20  "tablet, Rfl: 0    fluticasone (FLONASE) 50 MCG/ACT nasal spray, 2 sprays Daily., Disp: , Rfl:     Loratadine 10 MG capsule, Take  by mouth., Disp: , Rfl:     ondansetron ODT (ZOFRAN-ODT) 4 MG disintegrating tablet, Place 1 tablet on the tongue Every 8 (Eight) Hours As Needed for Nausea or Vomiting., Disp: 20 tablet, Rfl: 0    pantoprazole (PROTONIX) 40 MG EC tablet, Take 1 tablet by mouth 2 (Two) Times a Day Before Meals., Disp: 180 tablet, Rfl: 3    promethazine (PHENERGAN) 25 MG tablet, Take 1 tablet by mouth Every 8 (Eight) Hours As Needed for Nausea or Vomiting., Disp: 20 tablet, Rfl: 0    amitriptyline (ELAVIL) 25 MG tablet, Take 1 tablet by mouth Every Night., Disp: 30 tablet, Rfl: 2    ALLERGIES  Amoxicillin    VITALS  Vitals:    11/06/24 1103   BP: 110/80   BP Location: Left arm   Patient Position: Sitting   Cuff Size: Adult   Weight: 69 kg (152 lb 3.2 oz)   Height: 160 cm (63\")       PHYSICAL EXAM  Debilities/Disabilities Identified: None  Emotional Behavior: Appropriate  Wt Readings from Last 3 Encounters:   11/06/24 69 kg (152 lb 3.2 oz)   10/23/24 69.4 kg (153 lb)   10/14/24 70.3 kg (155 lb)     Ht Readings from Last 1 Encounters:   11/06/24 160 cm (63\")     Body mass index is 26.96 kg/m².  Physical Exam  Constitutional:       General: She is not in acute distress.     Appearance: Normal appearance. She is not ill-appearing.   HENT:      Head: Normocephalic and atraumatic.      Mouth/Throat:      Mouth: Mucous membranes are moist.      Pharynx: No posterior oropharyngeal erythema.   Eyes:      General: No scleral icterus.  Cardiovascular:      Rate and Rhythm: Normal rate and regular rhythm.      Heart sounds: Normal heart sounds.   Pulmonary:      Effort: Pulmonary effort is normal.      Breath sounds: Normal breath sounds.   Abdominal:      General: Abdomen is flat. Bowel sounds are normal. There is no distension.      Palpations: Abdomen is soft. There is no mass.      Tenderness: There is no " abdominal tenderness. There is no guarding or rebound. Negative signs include Farmer's sign.      Hernia: No hernia is present.   Musculoskeletal:      Cervical back: Neck supple.   Skin:     General: Skin is warm.      Capillary Refill: Capillary refill takes less than 2 seconds.   Neurological:      General: No focal deficit present.      Mental Status: She is alert and oriented to person, place, and time.   Psychiatric:         Mood and Affect: Mood normal.         Behavior: Behavior normal.         Thought Content: Thought content normal.         Judgment: Judgment normal.         CLINICAL DATA REVIEWED   reviewed previous lab results and integrated with today's visit, reviewed notes from other physicians and/or last GI encounter, reviewed previous endoscopy results and available photos, reviewed surgical pathology results from previous biopsies    ASSESSMENT  Diagnoses and all orders for this visit:    Oral lesion  -     HSV 1 & 2 - Specific Antibody, IgG; Future    Gastroesophageal reflux disease with esophagitis without hemorrhage    Other orders  -     amitriptyline (ELAVIL) 25 MG tablet; Take 1 tablet by mouth Every Night.          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    I have discussed the above plan with the patient.  They verbalize understanding and are in agreement with the plan.  They have been advised to contact the office for any questions, concerns, or changes related to their health.

## 2024-11-07 LAB
HSV1 IGG SERPL QL IA: REACTIVE
HSV2 IGG SERPL QL IA: NON REACTIVE

## 2024-11-21 ENCOUNTER — OFFICE VISIT (OUTPATIENT)
Dept: OBSTETRICS AND GYNECOLOGY | Facility: CLINIC | Age: 44
End: 2024-11-21
Payer: COMMERCIAL

## 2024-11-21 VITALS
HEIGHT: 63 IN | SYSTOLIC BLOOD PRESSURE: 116 MMHG | WEIGHT: 157 LBS | DIASTOLIC BLOOD PRESSURE: 72 MMHG | BODY MASS INDEX: 27.82 KG/M2

## 2024-11-21 DIAGNOSIS — Z11.3 SCREENING EXAMINATION FOR STD (SEXUALLY TRANSMITTED DISEASE): ICD-10-CM

## 2024-11-21 DIAGNOSIS — Z01.419 ROUTINE GYNECOLOGICAL EXAMINATION: ICD-10-CM

## 2024-11-21 DIAGNOSIS — Z12.31 ENCOUNTER FOR SCREENING MAMMOGRAM FOR MALIGNANT NEOPLASM OF BREAST: ICD-10-CM

## 2024-11-21 DIAGNOSIS — Z01.419 PAP SMEAR, AS PART OF ROUTINE GYNECOLOGICAL EXAMINATION: Primary | ICD-10-CM

## 2024-11-21 DIAGNOSIS — Z71.85 HPV VACCINE COUNSELING: ICD-10-CM

## 2024-11-21 DIAGNOSIS — Z11.51 SPECIAL SCREENING EXAMINATION FOR HUMAN PAPILLOMAVIRUS (HPV): ICD-10-CM

## 2024-11-21 DIAGNOSIS — R31.9 HEMATURIA, UNSPECIFIED TYPE: ICD-10-CM

## 2024-11-21 LAB
BILIRUB BLD-MCNC: NEGATIVE MG/DL
CLARITY, POC: CLEAR
COLOR UR: ABNORMAL
GLUCOSE UR STRIP-MCNC: NEGATIVE MG/DL
KETONES UR QL: NEGATIVE
LEUKOCYTE EST, POC: NEGATIVE
NITRITE UR-MCNC: NEGATIVE MG/ML
PH UR: 5 [PH] (ref 5–8)
PROT UR STRIP-MCNC: NEGATIVE MG/DL
RBC # UR STRIP: ABNORMAL /UL
SP GR UR: 1.03 (ref 1–1.03)
UROBILINOGEN UR QL: NORMAL

## 2024-11-21 RX ORDER — ACYCLOVIR 400 MG/1
TABLET ORAL
COMMUNITY
Start: 2024-11-14

## 2024-11-21 RX ORDER — BENZONATATE 200 MG/1
CAPSULE ORAL
COMMUNITY
Start: 2024-11-13

## 2024-11-21 NOTE — PROGRESS NOTES
GYN Exam    CC- Here for annual     Marilee Kraus is a 44 y.o. female est pt who presents for annual and 6 month repeat pap smear. She had a CKC in 2022 and had RUMA 3 with neg margins and a neg ECC. She had a TLH/BS with OC in 2023 for cervical dysplasia and her final past was normal. Her pap in 2024 was LGSIl with neg HPV.  She is agreeable to getting the Gardasil vaccine. Her pain has resolved.     OB History          2    Para   2    Term   2       0    AB   0    Living   2         SAB   0    IAB   0    Ectopic   0    Molar   0    Multiple        Live Births   2          Obstetric Comments   No plans               Menarche: 15  Current contraception: TLH with OC .  History of abnormal Pap smear: yes- LEEP 2020- neg path, 2022- CKC with RUMA 3, neg margins and ECC. LH/BS with OC in 2023 for cervical dysplasia and her final past was normal.  History of abnormal mammogram: no  Family history of uterine, colon or ovarian cancer: no  Family history of breast cancer: no  H/o STDs: none  Last pap: 2024 LGSIL neg HPVC  Gardasil:missed  JIGNA: none    Health Maintenance   Topic Date Due    BMI FOLLOWUP  Never done    Pneumococcal Vaccine 0-64 (1 of 2 - PCV) Never done    TDAP/TD VACCINES (1 - Tdap) Never done    ANNUAL PHYSICAL  Never done    Annual Gynecologic Pelvic and Breast Exam  2024    INFLUENZA VACCINE  Never done    COVID-19 Vaccine ( - - season) Never done    MAMMOGRAM  2026    PAP SMEAR  2027    HEPATITIS C SCREENING  Completed       Past Medical History:   Diagnosis Date    Abnormal Pap smear of cervix 2018    ASCUS + HPV =- no follow up    Anxiety     Asthma     Cervical dysplasia     RUMA 2 on bx, neg LEEP, RUMA 3 on CKC, neg margins    Depression     GERD (gastroesophageal reflux disease)     S/P LEEP 2020       Past Surgical History:   Procedure Laterality Date    CERVICAL BIOPSY  W/ LOOP ELECTRODE EXCISION      CERVICAL CONIZATION N/A  2022    Procedure: CERVICAL CONIZATION;  Surgeon: Sarah Berger MD;  Location: [facility] OR;  Service: Obstetrics/Gynecology;  Laterality: N/A;     SECTION      placenta previa     SECTION       SECTION      ENDOSCOPY N/A 2021    Procedure: ESOPHAGOGASTRODUODENOSCOPY with biopsies;  Surgeon: Lee Tubbs MD;  Location: [facility] OR;  Service: Gastroenterology;  Laterality: N/A;  Reflux  Biopsies: gastric, distal esophagus    ENDOSCOPY N/A 2024    Procedure: ESOPHAGOGASTRODUODENOSCOPY WITH BIOPSY;  Surgeon: Lee Tubbs MD;  Location:  [facility] OR;  Service: Gastroenterology;  Laterality: N/A;  gastritis- gastric biopsy, esophagitis-distal esophagus biopsy    LEEP N/A 2020    Procedure: LOOP ELECTROCAUTERY EXCISION PROCEDURE;  Surgeon: Sarah Berger MD;  Location: [facility] OR;  Service: Obstetrics/Gynecology;  Laterality: N/A;    TOTAL LAPAROSCOPIC HYSTERECTOMY SALPINGECTOMY N/A 8/15/2023    Procedure: TOTAL LAPAROSCOPIC HYSTERECTOMY , bilateral salpingectomy, lysis of adhesions;  Surgeon: Sarah Berger MD;  Location: [facility] OR;  Service: Obstetrics/Gynecology;  Laterality: N/A;         Current Outpatient Medications:     acyclovir (ZOVIRAX) 400 MG tablet, TAKE 1 TABLET BY MOUTH 4 TIMES DAILY FOR 7 DAYS AND THEN 1 ONCE DAILY, Disp: , Rfl:     albuterol sulfate  (90 Base) MCG/ACT inhaler, , Disp: , Rfl:     amitriptyline (ELAVIL) 25 MG tablet, Take 1 tablet by mouth Every Night., Disp: 30 tablet, Rfl: 2    fluticasone (FLONASE) 50 MCG/ACT nasal spray, 2 sprays Daily., Disp: , Rfl:     benzonatate (TESSALON) 200 MG capsule, TAKE 1 CAPSULE BY MOUTH EVERY 4 HOURS AS NEEDED FOR COUGH (Patient not taking: Reported on 2024), Disp: , Rfl:     busPIRone (BUSPAR) 15 MG tablet, Take 1 tablet by mouth 2 (Two) Times a Day As Needed. for anxiety (Patient not taking: Reported on 2024), Disp: , Rfl:      butalbital-acetaminophen-caffeine (FIORICET, ESGIC) -40 MG per tablet, Take 2 tablets by mouth Every 6 (Six) Hours As Needed for Headache. (Patient not taking: Reported on 11/21/2024), Disp: 20 tablet, Rfl: 0    Loratadine 10 MG capsule, Take  by mouth. (Patient not taking: Reported on 11/21/2024), Disp: , Rfl:     ondansetron ODT (ZOFRAN-ODT) 4 MG disintegrating tablet, Place 1 tablet on the tongue Every 8 (Eight) Hours As Needed for Nausea or Vomiting. (Patient not taking: Reported on 11/21/2024), Disp: 20 tablet, Rfl: 0    pantoprazole (PROTONIX) 40 MG EC tablet, Take 1 tablet by mouth 2 (Two) Times a Day Before Meals. (Patient not taking: Reported on 11/21/2024), Disp: 180 tablet, Rfl: 3    promethazine (PHENERGAN) 25 MG tablet, Take 1 tablet by mouth Every 8 (Eight) Hours As Needed for Nausea or Vomiting. (Patient not taking: Reported on 11/21/2024), Disp: 20 tablet, Rfl: 0    Allergies   Allergen Reactions    Amoxicillin Itching       Social History     Tobacco Use    Smoking status: Never    Smokeless tobacco: Never   Vaping Use    Vaping status: Never Used   Substance Use Topics    Alcohol use: Never    Drug use: Never       Family History   Problem Relation Age of Onset    Breast cancer Neg Hx     Ovarian cancer Neg Hx     Uterine cancer Neg Hx     Colon cancer Neg Hx     Prostate cancer Neg Hx     Deep vein thrombosis Neg Hx     Malig Hyperthermia Neg Hx        Review of Systems   Constitutional:  Negative for activity change, appetite change, fatigue, fever and unexpected weight change.   HENT:          + sour taste in mouth   Eyes:  Negative for photophobia and visual disturbance.   Respiratory:  Negative for cough and shortness of breath.    Cardiovascular:  Negative for chest pain and palpitations.   Gastrointestinal:  Negative for abdominal distention, abdominal pain, constipation, diarrhea and nausea.   Endocrine: Negative for cold intolerance and heat intolerance.   Genitourinary:  Negative  "for dyspareunia, dysuria, menstrual problem, pelvic pain, vaginal bleeding and vaginal discharge.   Musculoskeletal:  Negative for back pain.   Skin:  Negative for color change and rash.   Allergic/Immunologic: Negative for environmental allergies and food allergies.   Neurological:  Negative for headaches.   Hematological:  Negative for adenopathy. Does not bruise/bleed easily.   Psychiatric/Behavioral:  Negative for dysphoric mood. The patient is not nervous/anxious.    All other systems reviewed and are negative.      /72   Ht 160 cm (63\")   Wt 71.2 kg (157 lb)   LMP 07/19/2023 (Exact Date)   BMI 27.81 kg/m²     Physical Exam   Constitutional: She is oriented to person, place, and time. She appears well-developed.   HENT:   Head: Normocephalic and atraumatic.   Eyes: Conjunctivae are normal. No scleral icterus.   Neck: No thyromegaly present.   Cardiovascular: Normal rate and regular rhythm.   Pulmonary/Chest: Effort normal and breath sounds normal. Right breast exhibits no inverted nipple, no mass, no nipple discharge, no skin change and no tenderness. Left breast exhibits no inverted nipple, no mass, no nipple discharge, no skin change and no tenderness.   Breasts are symmetrical, large size  No palpable masses, no skin changes, no LAD     Abdominal: Soft. Normal appearance. She exhibits no distension and no mass. There is no abdominal tenderness. There is no rebound and no guarding. No hernia.   Genitourinary:    Right adnexa and left adnexa normal.   There is no rash, tenderness, lesion or injury on the right labia. There is no rash, tenderness, lesion or injury on the left labia.    No vaginal discharge, erythema, tenderness or bleeding.   No erythema, tenderness or bleeding in the vagina.    No foreign body in the vagina.      No signs of injury or lesions in the vagina.      Genitourinary Comments: Uterus and cervix absent  Normal cuff     Neurological: She is alert and oriented to person, place, " and time.   Skin: Skin is warm and dry.   Psychiatric: Her behavior is normal. Mood, judgment and thought content normal.   Nursing note and vitals reviewed.            Assessment/Plan      1) GYN HM: s/p CKC & TLH. Check pap/HPVSBE demonstrated and encouraged.  2) STD screening: accepts. . Condoms encouraged.  3) Contraception: condoms and TLH  4) Family Planning: family planning: considering kids, uncertain, encourage folic acid daily  5) Diet and Exercise discussed  6) Smoking Status: No  7) Social: no issues  8) MMG- UTD 8/2024, B1. Schedule MMG 8/2025  9) Discussed with patient risks, benefits and alternatives to the Gardasil vaccination.  The vaccine is administered in the arm in a series of 3 shots at 02 in 6 months.  It provides a 70 to 90% reduction in HPV related diseases such as abnormal Pap smears, genital warts and cervical cancer.  The vaccine was originally approved for ages 9-26, but is recently been expanded to the age of 45.  The most common side effects are pain at the injection site and fainting.  Any and all adverse side effects are tracked by the FDA and are available on their website for review.  After consideration, the patient plans first dose today. RTO in 2 & 6 months for second and third gardasil shots ( nurse only)  10)Hematuria- check UA and CS  11) Cscope- plan age 46  12)Parts of this document have been copied or forwarded from her previous visits and have been reviewed, updated and edited as indicated.   13) Follow up rpn and 1 year annual      Diagnoses and all orders for this visit:    1. Pap smear, as part of routine gynecological examination (Primary)  -     Cancel: IGP, Apt HPV,rfx 16 / 18,45  -     IGP,CtNgTv,Apt HPV,rfx 16 / 18,45    2. Routine gynecological examination  -     POC Urinalysis Dipstick  -     Cancel: IGP, Apt HPV,rfx 16 / 18,45  -     IGP,CtNgTv,Apt HPV,rfx 16 / 18,45    3. Special screening examination for human papillomavirus (HPV)  -     Cancel: IGP, Apt  HPV,rfx 16 / 18,45  -     IGP,CtNgTv,Apt HPV,rfx 16 / 18,45    4. Screening examination for STD (sexually transmitted disease)  -     Hepatitis B Surface Antigen  -     Hepatitis C Antibody  -     HIV-1 / O / 2 Ag / Antibody  -     HSV 1 & 2 - Specific Antibody, IgG  -     RPR, Rfx Qn RPR / Confirm TP    5. Encounter for screening mammogram for malignant neoplasm of breast  -     Mammo Screening Digital Tomosynthesis Bilateral With CAD; Future    6. Hematuria, unspecified type  -     Urine Culture - Urine, Urine, Random Void  -     Urinalysis With Microscopic - Urine, Random Void    7. HPV vaccine counseling  -     HPV 9-Valent Recomb Vaccine suspension 0.5 mL    Other orders  -     Microscopic Examination -          Sarah Berger MD  11/21/2024  19:32 EST

## 2024-11-22 LAB
HBV SURFACE AG SERPL QL IA: NEGATIVE
HCV IGG SERPL QL IA: NON REACTIVE
HIV 1+2 AB+HIV1 P24 AG SERPL QL IA: NON REACTIVE
HSV1 IGG SERPL QL IA: REACTIVE
HSV2 IGG SERPL QL IA: NON REACTIVE
RPR SER QL: NON REACTIVE

## 2024-11-23 LAB
APPEARANCE UR: CLEAR
BACTERIA #/AREA URNS HPF: ABNORMAL /HPF
BACTERIA UR CULT: NORMAL
BACTERIA UR CULT: NORMAL
BILIRUB UR QL STRIP: NEGATIVE
CASTS URNS MICRO: ABNORMAL
COLOR UR: YELLOW
EPI CELLS #/AREA URNS HPF: ABNORMAL /HPF
GLUCOSE UR QL STRIP: NEGATIVE
HGB UR QL STRIP: NEGATIVE
KETONES UR QL STRIP: ABNORMAL
LEUKOCYTE ESTERASE UR QL STRIP: NEGATIVE
NITRITE UR QL STRIP: NEGATIVE
PH UR STRIP: 5.5 [PH] (ref 5–8)
PROT UR QL STRIP: NEGATIVE
RBC #/AREA URNS HPF: ABNORMAL /HPF
SP GR UR STRIP: 1.03 (ref 1–1.03)
UROBILINOGEN UR STRIP-MCNC: ABNORMAL MG/DL
WBC #/AREA URNS HPF: ABNORMAL /HPF

## 2024-11-26 NOTE — PROGRESS NOTES
PIP= normal urine culture, no blood, no UTI. Blood portion of STD panel shows previous exposure to the cold sore virus

## 2024-12-03 LAB
C TRACH RRNA CVX QL NAA+PROBE: NEGATIVE
CYTOLOGIST CVX/VAG CYTO: ABNORMAL
CYTOLOGY CVX/VAG DOC CYTO: ABNORMAL
CYTOLOGY CVX/VAG DOC THIN PREP: ABNORMAL
DX ICD CODE: ABNORMAL
DX ICD CODE: ABNORMAL
HPV GENOTYPE REFLEX: ABNORMAL
HPV I/H RISK 4 DNA CVX QL PROBE+SIG AMP: NEGATIVE
Lab: ABNORMAL
N GONORRHOEA RRNA CVX QL NAA+PROBE: NEGATIVE
OTHER STN SPEC: ABNORMAL
PATHOLOGIST CVX/VAG CYTO: ABNORMAL
RECOM F/U CVX/VAG CYTO: ABNORMAL
STAT OF ADQ CVX/VAG CYTO-IMP: ABNORMAL
T VAGINALIS RRNA SPEC QL NAA+PROBE: NEGATIVE

## 2024-12-23 ENCOUNTER — OFFICE VISIT (OUTPATIENT)
Dept: OBSTETRICS AND GYNECOLOGY | Facility: CLINIC | Age: 44
End: 2024-12-23
Payer: COMMERCIAL

## 2024-12-23 VITALS
WEIGHT: 155 LBS | HEIGHT: 63 IN | DIASTOLIC BLOOD PRESSURE: 62 MMHG | BODY MASS INDEX: 27.46 KG/M2 | SYSTOLIC BLOOD PRESSURE: 110 MMHG

## 2024-12-23 DIAGNOSIS — R31.9 HEMATURIA, UNSPECIFIED TYPE: ICD-10-CM

## 2024-12-23 DIAGNOSIS — Z13.89 SCREENING FOR GENITOURINARY CONDITION: ICD-10-CM

## 2024-12-23 DIAGNOSIS — R10.2 PELVIC PAIN: Primary | ICD-10-CM

## 2024-12-23 LAB
BILIRUB BLD-MCNC: NEGATIVE MG/DL
CLARITY, POC: CLEAR
COLOR UR: YELLOW
GLUCOSE UR STRIP-MCNC: NEGATIVE MG/DL
KETONES UR QL: NEGATIVE
LEUKOCYTE EST, POC: NEGATIVE
NITRITE UR-MCNC: NEGATIVE MG/ML
PH UR: 5 [PH] (ref 5–8)
PROT UR STRIP-MCNC: NEGATIVE MG/DL
RBC # UR STRIP: ABNORMAL /UL
SP GR UR: 1.02 (ref 1–1.03)
UROBILINOGEN UR QL: NORMAL

## 2024-12-23 NOTE — PROGRESS NOTES
"Subjective     Chief Complaint   Patient presents with    Follow-up     Pain       Marilee Kraus is a 44 y.o.  whose LMP is Patient's last menstrual period was 2023 (exact date).. She presents with c/o pelvic cramping. She reports the cramping is so severe that she can not walk. She feels like the private areas are \"opening like when you are going to have a baby.\" She also reports it feels \"very hot inside my womb.\" She reports this has been going on for 2-3 months. She also c/o back pain.  The pain is starting to get so bad she can not stand it. She had an US in 10/24 for ovarian pain.  She feels a weird sensation on her private area when she urinates.  She reports the pain is worse when she works.     She is S/P TLH/BS with OC in 2023 for cervical dysplasia. Her pap  was LGSIL/HPV neg.     She is sexually active but her  is in [place]    HPI    HPI    The following portions of the patient's history were reviewed and updated as appropriate:vital signs, allergies, current medications, past medical history, past social history, past surgical history, and problem list      Review of Systems     Review of Systems   Constitutional:  Negative for chills and fever.   Gastrointestinal:  Positive for abdominal pain. Negative for constipation and diarrhea.   Genitourinary:  Positive for pelvic pain. Negative for difficulty urinating, dyspareunia, dysuria, flank pain and frequency.   Musculoskeletal:  Positive for back pain.       Objective      /62   Ht 160 cm (63\")   Wt 70.3 kg (155 lb)   LMP 2023 (Exact Date)   BMI 27.46 kg/m²     Physical Exam    Physical Exam  Vitals and nursing note reviewed.   Constitutional:       Appearance: Normal appearance.   Genitourinary:     Labia:         Right: No rash, tenderness or lesion.         Left: No rash, tenderness or lesion.       Vagina: No signs of injury and foreign body. No vaginal discharge, erythema, tenderness or bleeding.      " Uterus: Absent.       Adnexa: Right adnexa normal and left adnexa normal.      Comments: + LPS  Musculoskeletal:         General: Normal range of motion.   Skin:     General: Skin is warm and dry.   Neurological:      General: No focal deficit present.      Mental Status: She is alert and oriented to person, place, and time.   Psychiatric:         Mood and Affect: Mood normal.         Behavior: Behavior normal.     Pelvic exam per Dr. Berger     Lab Review   Labs: Urinalysis - with micro     Imaging   Ultrasound - Pelvic Vaginal 10/24    Assessment  Diagnoses and all orders for this visit:    1. Pelvic pain (Primary)  -     NuSwab VG+ - Swab, Vagina  -     Genital Mycoplasmas MANJIT, Swab - Swab, Vagina  -     CT Pelvis With & Without Contrast; Future    2. Screening for genitourinary condition  -     POC Urinalysis Dipstick    3. Hematuria, unspecified type        Assessment/Plan   Pelvic pain- Check NuSwab. Does have pelvic floor spasm. Ref to pelvic floor PT. Check CT Scan. Enc rotation of tylenol and ibuprofen for pain.   Hematuria - Check urine culture. Check CT Scan with stone protocol.   HSV 1- Disc cold sore virus.   LGSIL of cuff 11/24- Has a colpo scheduled with Dr. Berger 2/25.     RTO PRN and keep scheduled appt     Susana Sorenson, APRN  12/23/2024

## 2024-12-26 ENCOUNTER — TELEPHONE (OUTPATIENT)
Dept: OBSTETRICS AND GYNECOLOGY | Facility: CLINIC | Age: 44
End: 2024-12-26
Payer: COMMERCIAL

## 2024-12-26 DIAGNOSIS — R10.2 PELVIC PAIN: ICD-10-CM

## 2024-12-26 DIAGNOSIS — R31.9 HEMATURIA, UNSPECIFIED TYPE: Primary | ICD-10-CM

## 2024-12-26 NOTE — TELEPHONE ENCOUNTER
Insurance denied patient CT scan, but states they will cover CT of abd. And pelvis? If this is something you want, please order and I will get authorization.

## 2024-12-30 LAB
A VAGINAE DNA VAG QL NAA+PROBE: NORMAL SCORE
BVAB2 DNA VAG QL NAA+PROBE: NORMAL SCORE
C ALBICANS DNA VAG QL NAA+PROBE: NEGATIVE
C GLABRATA DNA VAG QL NAA+PROBE: NEGATIVE
C TRACH DNA SPEC QL NAA+PROBE: NEGATIVE
M GENITALIUM DNA SPEC QL NAA+PROBE: NEGATIVE
M HOMINIS DNA SPEC QL NAA+PROBE: NEGATIVE
MEGA1 DNA VAG QL NAA+PROBE: NORMAL SCORE
N GONORRHOEA DNA VAG QL NAA+PROBE: NEGATIVE
T VAGINALIS DNA VAG QL NAA+PROBE: NEGATIVE
UREAPLASMA DNA SPEC QL NAA+PROBE: NEGATIVE

## 2025-01-20 DIAGNOSIS — K80.50 CALCULUS OF BILE DUCT WITHOUT CHOLECYSTITIS AND WITHOUT OBSTRUCTION: Primary | ICD-10-CM

## 2025-01-22 ENCOUNTER — CLINICAL SUPPORT (OUTPATIENT)
Dept: OBSTETRICS AND GYNECOLOGY | Facility: CLINIC | Age: 45
End: 2025-01-22
Payer: COMMERCIAL

## 2025-01-22 DIAGNOSIS — Z23 NEED FOR HPV VACCINATION: Primary | ICD-10-CM

## 2025-01-23 ENCOUNTER — TELEPHONE (OUTPATIENT)
Dept: SURGERY | Facility: CLINIC | Age: 45
End: 2025-01-23

## 2025-01-23 NOTE — TELEPHONE ENCOUNTER
This patient requires an  for their appointment. Please see the  information below.     Caller: JAMA DEY  Relationship to patient: SELF  Best call back number: [phone] (home)    Service:IPAD  Is  needs updated in registration: yes  Date of Appointment:02/03/2025  Time of Appointment:330 PM  Additional notes: IPAD INT

## 2025-01-29 ENCOUNTER — OFFICE VISIT (OUTPATIENT)
Dept: GASTROENTEROLOGY | Facility: CLINIC | Age: 45
End: 2025-01-29
Payer: COMMERCIAL

## 2025-01-29 VITALS
SYSTOLIC BLOOD PRESSURE: 102 MMHG | HEIGHT: 63 IN | DIASTOLIC BLOOD PRESSURE: 78 MMHG | WEIGHT: 158.8 LBS | BODY MASS INDEX: 28.14 KG/M2

## 2025-01-29 DIAGNOSIS — K59.04 CHRONIC IDIOPATHIC CONSTIPATION: ICD-10-CM

## 2025-01-29 DIAGNOSIS — K21.00 GASTROESOPHAGEAL REFLUX DISEASE WITH ESOPHAGITIS WITHOUT HEMORRHAGE: Primary | ICD-10-CM

## 2025-01-29 RX ORDER — ERGOCALCIFEROL 1.25 MG/1
1 CAPSULE, LIQUID FILLED ORAL WEEKLY
COMMUNITY
Start: 2024-12-12

## 2025-01-29 RX ORDER — PANTOPRAZOLE SODIUM 40 MG/1
40 TABLET, DELAYED RELEASE ORAL 2 TIMES DAILY
COMMUNITY
End: 2025-01-29 | Stop reason: SDUPTHER

## 2025-01-29 RX ORDER — PANTOPRAZOLE SODIUM 40 MG/1
40 TABLET, DELAYED RELEASE ORAL 2 TIMES DAILY
Qty: 180 TABLET | Refills: 3 | Status: SHIPPED | OUTPATIENT
Start: 2025-01-29

## 2025-01-29 NOTE — PROGRESS NOTES
PATIENT INFORMATION  Marilee Kraus       - 1980    CHIEF COMPLAINT  Chief Complaint   Patient presents with    Heartburn       HISTORY OF PRESENT ILLNESS    Here today for early follow-up    Feeling good the last 3 weeks feeling good, was having lower abdominal pain and went to gyn, and CT showed large gallstone, possible sludge, possible stones in cystic duct and gen sx referral placed, lower abdominal pain, lasted 1 week, worse when walking or do something, improved when taking tylenol. Was having difficulty having a BM, skipping 2-3 days and then having hard stools, all pain resolved with a dulcolax supp. At the same time throat was hurting. Appt with Dr. Beckwith 2/3.     Pain always included burning in chest, throat and stomach with lower abdominal cramping. Bad all of December. Some days was constant, other days came and gone. Exacerbating factors including eating, pain was 30 min after she ate and lasted 4 hours. Alleviating factors include tylenol and pantoprazole, dulcolax led to complete resolution of chest and lower abdominal discomfort. Pain after she ate was lower abdominal with rectal tenesmus. . Discomfort always responds to pantoprazole.    Reviewed that if there are stones in the ducts would indicate surgery, though some symptoms inconsistent with biliary origin such as relief with BM. Likely co-existing issues that may still need to be addressed and could still cause symptoms after abdi. If recurrence of constipation call us, start miralax.    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 Always response to pantoprazole.  Mouth is burning 20% of the time which is less than it was.     Pressure in chest was 100% of the time in December, no 20% of the time. Only change in diet was green tea.     GERD: Managed on BID PPI, doing well at last follow-up other than  burning in mouth.     5/9/2024 EGD for dyspepsia with reactive gastropathy, reflux esophagitis, no HP or barretts.        REVIEWED PERTINENT RESULTS/ LABS  Lab Results   Component Value Date    CASEREPORT  05/09/2024     Surgical Pathology Report                         Case: [id number]                                  Authorizing Provider:  Lee Tubbs        Collected:           05/09/2024 04:22 PM                                 MD Clement                                                                   Ordering Location:     [facility]   Received:            05/09/2024 05:06 PM                                 OR                                                                           Pathologist:           Shruti Menjivar MD                                                          Specimens:   1) - Stomach, gastric biopsy                                                                        2) - Esophagus, Distal, distal esophagus biopsy                                            FINALDX  05/09/2024     1.  Stomach, biopsy: Antral and oxyntic type gastric mucosa with   A. Mild reactive/chemical gastropathy; no significant inflammation.   B. No metaplasia, dysplasia nor malignancy.   C. No H. pylori-like organisms identified.    2.  Esophagus, distal, biopsy: Squamoglandular mucosa and submucosa with    A. No significant eosinophilia.    B. No definitive well-developed goblet cell metaplasia identified by routine staining.   C. No dysplasia nor malignancy.                 D. No viral inclusions nor fungal organisms identified.       Lab Results   Component Value Date    HGB 13.9 10/23/2024    MCV 95.8 10/23/2024     10/23/2024    ALT 35 (H) 10/23/2024    AST 36 (H) 10/23/2024    FERRITIN 229.00 (H) 12/11/2021    IRON 81 04/25/2022    TIBC 422 04/25/2022      CT outside films    Result Date: 1/17/2025  Narrative: This procedure was auto-finalized with no dictation required.      REVIEW OF SYSTEMS  Review of Systems      ACTIVE PROBLEMS  Patient Active Problem List    Diagnosis     Pelvic pain [R10.2]     S/P conization of cervix [Z98.890]     Chronic idiopathic constipation [K59.04]     Helicobacter pylori gastritis [K29.70, B96.81]     Cytokine release syndrome, grade 1 [D89.831]     Pneumonia due to COVID-19 virus [U07.1, J12.82]     Epigastric pain [R10.13]     S/P LEEP [Z98.890]     Dysplasia of cervix, high grade RUMA 2 [N87.1]     Breast pain [N64.4]          PAST MEDICAL HISTORY  Past Medical History:   Diagnosis Date    Abnormal Pap smear of cervix 2018    ASCUS + HPV =- no follow up    Anxiety     Asthma     Cervical dysplasia     RUMA 2 on bx, neg LEEP, RUMA 3 on CKC, neg margins    Depression     GERD (gastroesophageal reflux disease)     S/P LEEP 2020         SURGICAL HISTORY  Past Surgical History:   Procedure Laterality Date    CERVICAL BIOPSY  W/ LOOP ELECTRODE EXCISION      CERVICAL CONIZATION N/A 2022    Procedure: CERVICAL CONIZATION;  Surgeon: Sarah Berger MD;  Location: [facility];  Service: Obstetrics/Gynecology;  Laterality: N/A;     SECTION      placenta previa     SECTION       SECTION      ENDOSCOPY N/A 2021    Procedure: ESOPHAGOGASTRODUODENOSCOPY with biopsies;  Surgeon: Lee Tubbs MD;  Location: [facility];  Service: Gastroenterology;  Laterality: N/A;  Reflux  Biopsies: gastric, distal esophagus    ENDOSCOPY N/A 2024    Procedure: ESOPHAGOGASTRODUODENOSCOPY WITH BIOPSY;  Surgeon: Lee Tubbs MD;  Location: [facility];  Service: Gastroenterology;  Laterality: N/A;  gastritis- gastric biopsy, esophagitis-distal esophagus biopsy    LEEP N/A 2020    Procedure: LOOP ELECTROCAUTERY EXCISION PROCEDURE;  Surgeon: Sarah Berger MD;  Location: [facility];  Service: Obstetrics/Gynecology;  Laterality: N/A;    TOTAL LAPAROSCOPIC HYSTERECTOMY SALPINGECTOMY  "N/A 8/15/2023    Procedure: TOTAL LAPAROSCOPIC HYSTERECTOMY , bilateral salpingectomy, lysis of adhesions;  Surgeon: Sarah Berger MD;  Location: [facility];  Service: Obstetrics/Gynecology;  Laterality: N/A;         FAMILY HISTORY  Family History   Problem Relation Age of Onset    Breast cancer Neg Hx     Ovarian cancer Neg Hx     Uterine cancer Neg Hx     Colon cancer Neg Hx     Prostate cancer Neg Hx     Deep vein thrombosis Neg Hx     Malig Hyperthermia Neg Hx          SOCIAL HISTORY  Social History     Occupational History    Occupation: unemployed   Tobacco Use    Smoking status: Never    Smokeless tobacco: Never   Vaping Use    Vaping status: Never Used   Substance and Sexual Activity    Alcohol use: Never    Drug use: Never    Sexual activity: Not Currently     Partners: Male     Birth control/protection: Hysterectomy     Comment: 8/2023- TLH/BS with OC         CURRENT MEDICATIONS    Current Outpatient Medications:     acyclovir (ZOVIRAX) 400 MG tablet, TAKE 1 TABLET BY MOUTH 4 TIMES DAILY FOR 7 DAYS AND THEN 1 ONCE DAILY, Disp: , Rfl:     albuterol sulfate  (90 Base) MCG/ACT inhaler, , Disp: , Rfl:     Diclofenac Sodium (VOLTAREN) 1 % gel gel, As Needed., Disp: , Rfl:     fluticasone (FLONASE) 50 MCG/ACT nasal spray, 2 sprays As Needed., Disp: , Rfl:     pantoprazole (PROTONIX) 40 MG EC tablet, Take 1 tablet by mouth 2 (Two) Times a Day., Disp: 180 tablet, Rfl: 3    vitamin D (ERGOCALCIFEROL) 1.25 MG (94088 UT) capsule capsule, Take 1 capsule by mouth 1 (One) Time Per Week., Disp: , Rfl:     ALLERGIES  Amoxicillin    VITALS  Vitals:    01/29/25 0904   BP: 102/78   BP Location: Left arm   Patient Position: Sitting   Cuff Size: Adult   Weight: 72 kg (158 lb 12.8 oz)   Height: 160 cm (63\")       PHYSICAL EXAM  Debilities/Disabilities Identified: None  Emotional Behavior: Appropriate  Wt Readings from Last 3 Encounters:   01/29/25 72 kg (158 lb 12.8 oz)   12/23/24 70.3 kg (155 lb)   11/21/24 " "71.2 kg (157 lb)     Ht Readings from Last 1 Encounters:   01/29/25 160 cm (63\")     Body mass index is 28.13 kg/m².  Physical Exam  Constitutional:       General: She is not in acute distress.     Appearance: Normal appearance. She is not ill-appearing.   HENT:      Head: Normocephalic and atraumatic.      Mouth/Throat:      Mouth: Mucous membranes are moist.      Pharynx: No posterior oropharyngeal erythema.   Eyes:      General: No scleral icterus.  Cardiovascular:      Rate and Rhythm: Normal rate and regular rhythm.      Heart sounds: Normal heart sounds.   Pulmonary:      Effort: Pulmonary effort is normal.      Breath sounds: Normal breath sounds.   Abdominal:      General: Abdomen is flat. Bowel sounds are normal. There is no distension.      Palpations: Abdomen is soft. There is no mass.      Tenderness: There is abdominal tenderness in the right upper quadrant. There is no guarding or rebound. Negative signs include Farmer's sign.      Hernia: No hernia is present.   Musculoskeletal:      Cervical back: Neck supple.   Skin:     General: Skin is warm.      Capillary Refill: Capillary refill takes less than 2 seconds.   Neurological:      General: No focal deficit present.      Mental Status: She is alert and oriented to person, place, and time.   Psychiatric:         Mood and Affect: Mood normal.         Behavior: Behavior normal.         Thought Content: Thought content normal.         Judgment: Judgment normal.       CLINICAL DATA REVIEWED   reviewed previous lab results and integrated with today's visit, reviewed notes from other physicians and/or last GI encounter, reviewed previous endoscopy results and available photos, reviewed surgical pathology results from previous biopsies    ASSESSMENT  Diagnoses and all orders for this visit:    Gastroesophageal reflux disease with esophagitis without hemorrhage  -     pantoprazole (PROTONIX) 40 MG EC tablet; Take 1 tablet by mouth 2 (Two) Times a " Day.    Chronic idiopathic constipation    Other orders  -     Diclofenac Sodium (VOLTAREN) 1 % gel gel; As Needed.  -     vitamin D (ERGOCALCIFEROL) 1.25 MG (19225 UT) capsule capsule; Take 1 capsule by mouth 1 (One) Time Per Week.  -     Discontinue: pantoprazole (PROTONIX) 40 MG EC tablet; Take 1 tablet by mouth 2 (Two) Times a Day.          PLAN    GERD: Continue current meds  CIC: Miralax PRN, call us with any recurrence of issues  Keep consult with Dr. Tang Deng in about 3 months (around 4/29/2025).    I have discussed the above plan with the patient.  They verbalize understanding and are in agreement with the plan.  They have been advised to contact the office for any questions, concerns, or changes related to their health.

## 2025-02-03 ENCOUNTER — OFFICE VISIT (OUTPATIENT)
Dept: SURGERY | Facility: CLINIC | Age: 45
End: 2025-02-03
Payer: COMMERCIAL

## 2025-02-03 VITALS
BODY MASS INDEX: 27.54 KG/M2 | WEIGHT: 155.4 LBS | HEIGHT: 63 IN | DIASTOLIC BLOOD PRESSURE: 70 MMHG | SYSTOLIC BLOOD PRESSURE: 114 MMHG | HEART RATE: 86 BPM | OXYGEN SATURATION: 98 %

## 2025-02-03 DIAGNOSIS — K80.20 CALCULUS OF GALLBLADDER WITHOUT CHOLECYSTITIS WITHOUT OBSTRUCTION: Primary | ICD-10-CM

## 2025-02-03 PROCEDURE — 99204 OFFICE O/P NEW MOD 45 MIN: CPT | Performed by: SURGERY

## 2025-02-04 NOTE — PROGRESS NOTES
ASSESSMENT/PLAN:    45-year-old lady with symptomatic cholelithiasis, wishes to proceed with laparoscopic cholecystectomy.  She understands the nature of the procedure and the risks including but not limited to bleeding, infection, conversion open procedure, and the bowel function changes which can accompany cholecystectomy.    CC:     Abdominal pain    HPI:    45-year-old lady with several year history of intermittent upper abdominal pain associated with nausea and vomiting.  Relevant review of systems negative other than presenting complaints.    RADIOLOGY:   CT abdomen pelvis 2025 at [facility] demonstrated gallstones.  On my review of images, there was a 2.5 cm gallstone in the fundus of the gallbladder as well as multiple calcified stones in the neck and possibly cystic duct.    LABS:    CMP 10/23/2024: ALT 35, AST 36, otherwise normal  CBC 10/23/2024: Normal    SOCIAL HISTORY:   Denies tobacco use  Denies alcohol use    FAMILY HISTORY:    Colorectal cancer: Negative    PREVIOUS ABDOMINAL SURGERY    Total abdominal hysterectomy 8/15/2023   sections    PAST MEDICAL HISTORY:    Gastroesophageal reflux disease    MEDICATIONS:   Acyclovir  Albuterol  Protonix    ALLERGIES:   Amoxicillin-itching    PHYSICAL EXAM:   Constitutional: No acute distress  Vital signs:   Weight: 155 pounds  Height: 63 inches  BMI: 27.5  /70  HR 86  Respiratory: Normal nonlabored inspiratory effort  Cardiovascular: Regular rate, no jugular venous distention  Gastrointestinal: Soft and nontender    HENRY OCAMPO M.D.

## 2025-02-18 ENCOUNTER — APPOINTMENT (OUTPATIENT)
Dept: MRI IMAGING | Facility: HOSPITAL | Age: 45
End: 2025-02-18
Payer: COMMERCIAL

## 2025-02-18 ENCOUNTER — APPOINTMENT (OUTPATIENT)
Dept: CT IMAGING | Facility: HOSPITAL | Age: 45
End: 2025-02-18
Payer: COMMERCIAL

## 2025-02-18 ENCOUNTER — HOSPITAL ENCOUNTER (EMERGENCY)
Facility: HOSPITAL | Age: 45
Discharge: HOME | End: 2025-02-18
Attending: EMERGENCY MEDICINE | Admitting: EMERGENCY MEDICINE
Payer: COMMERCIAL

## 2025-02-18 VITALS
SYSTOLIC BLOOD PRESSURE: 94 MMHG | DIASTOLIC BLOOD PRESSURE: 61 MMHG | HEIGHT: 66 IN | BODY MASS INDEX: 24.91 KG/M2 | RESPIRATION RATE: 16 BRPM | OXYGEN SATURATION: 97 % | HEART RATE: 81 BPM | WEIGHT: 155 LBS | TEMPERATURE: 97.7 F

## 2025-02-18 DIAGNOSIS — K80.20 CALCULUS OF GALLBLADDER WITHOUT CHOLECYSTITIS WITHOUT OBSTRUCTION: Primary | ICD-10-CM

## 2025-02-18 LAB
ALBUMIN SERPL-MCNC: 3.9 G/DL (ref 3.5–5.2)
ALBUMIN/GLOB SERPL: 1.2 G/DL
ALP SERPL-CCNC: 94 U/L (ref 39–117)
ALT SERPL W P-5'-P-CCNC: 30 U/L (ref 1–33)
ANION GAP SERPL CALCULATED.3IONS-SCNC: 7.4 MMOL/L (ref 5–15)
AST SERPL-CCNC: 23 U/L (ref 1–32)
BASOPHILS # BLD AUTO: 0.07 10*3/MM3 (ref 0–0.2)
BASOPHILS NFR BLD AUTO: 0.6 % (ref 0–1.5)
BILIRUB SERPL-MCNC: 0.2 MG/DL (ref 0–1.2)
BUN SERPL-MCNC: 17 MG/DL (ref 6–20)
BUN/CREAT SERPL: 31.5 (ref 7–25)
CALCIUM SPEC-SCNC: 8.2 MG/DL (ref 8.6–10.5)
CHLORIDE SERPL-SCNC: 106 MMOL/L (ref 98–107)
CO2 SERPL-SCNC: 24.6 MMOL/L (ref 22–29)
CREAT SERPL-MCNC: 0.54 MG/DL (ref 0.57–1)
DEPRECATED RDW RBC AUTO: 43.4 FL (ref 37–54)
EGFRCR SERPLBLD CKD-EPI 2021: 116.6 ML/MIN/1.73
EOSINOPHIL # BLD AUTO: 0.1 10*3/MM3 (ref 0–0.4)
EOSINOPHIL NFR BLD AUTO: 0.9 % (ref 0.3–6.2)
ERYTHROCYTE [DISTWIDTH] IN BLOOD BY AUTOMATED COUNT: 12.3 % (ref 12.3–15.4)
GLOBULIN UR ELPH-MCNC: 3.2 GM/DL
GLUCOSE SERPL-MCNC: 111 MG/DL (ref 65–99)
HCT VFR BLD AUTO: 41.5 % (ref 34–46.6)
HGB BLD-MCNC: 13.4 G/DL (ref 12–15.9)
IMM GRANULOCYTES # BLD AUTO: 0.11 10*3/MM3 (ref 0–0.05)
IMM GRANULOCYTES NFR BLD AUTO: 1 % (ref 0–0.5)
LIPASE SERPL-CCNC: 27 U/L (ref 13–60)
LYMPHOCYTES # BLD AUTO: 3.16 10*3/MM3 (ref 0.7–3.1)
LYMPHOCYTES NFR BLD AUTO: 29.1 % (ref 19.6–45.3)
MCH RBC QN AUTO: 30.8 PG (ref 26.6–33)
MCHC RBC AUTO-ENTMCNC: 32.3 G/DL (ref 31.5–35.7)
MCV RBC AUTO: 95.4 FL (ref 79–97)
MONOCYTES # BLD AUTO: 0.95 10*3/MM3 (ref 0.1–0.9)
MONOCYTES NFR BLD AUTO: 8.8 % (ref 5–12)
NEUTROPHILS NFR BLD AUTO: 59.6 % (ref 42.7–76)
NEUTROPHILS NFR BLD AUTO: 6.46 10*3/MM3 (ref 1.7–7)
NRBC BLD AUTO-RTO: 0 /100 WBC (ref 0–0.2)
PLATELET # BLD AUTO: 373 10*3/MM3 (ref 140–450)
PMV BLD AUTO: 9.4 FL (ref 6–12)
POTASSIUM SERPL-SCNC: 4.2 MMOL/L (ref 3.5–5.2)
PROT SERPL-MCNC: 7.1 G/DL (ref 6–8.5)
RBC # BLD AUTO: 4.35 10*6/MM3 (ref 3.77–5.28)
SODIUM SERPL-SCNC: 138 MMOL/L (ref 136–145)
WBC NRBC COR # BLD AUTO: 10.85 10*3/MM3 (ref 3.4–10.8)

## 2025-02-18 PROCEDURE — A9577 INJ MULTIHANCE: HCPCS | Performed by: STUDENT IN AN ORGANIZED HEALTH CARE EDUCATION/TRAINING PROGRAM

## 2025-02-18 PROCEDURE — 25010000002 ONDANSETRON PER 1 MG: Performed by: EMERGENCY MEDICINE

## 2025-02-18 PROCEDURE — 25510000002 GADOBENATE DIMEGLUMINE 529 MG/ML SOLUTION: Performed by: STUDENT IN AN ORGANIZED HEALTH CARE EDUCATION/TRAINING PROGRAM

## 2025-02-18 PROCEDURE — 74177 CT ABD & PELVIS W/CONTRAST: CPT

## 2025-02-18 PROCEDURE — 80053 COMPREHEN METABOLIC PANEL: CPT | Performed by: EMERGENCY MEDICINE

## 2025-02-18 PROCEDURE — 25010000002 MORPHINE PER 10 MG: Performed by: EMERGENCY MEDICINE

## 2025-02-18 PROCEDURE — 99285 EMERGENCY DEPT VISIT HI MDM: CPT | Performed by: EMERGENCY MEDICINE

## 2025-02-18 PROCEDURE — 96375 TX/PRO/DX INJ NEW DRUG ADDON: CPT

## 2025-02-18 PROCEDURE — 83690 ASSAY OF LIPASE: CPT | Performed by: EMERGENCY MEDICINE

## 2025-02-18 PROCEDURE — 96374 THER/PROPH/DIAG INJ IV PUSH: CPT

## 2025-02-18 PROCEDURE — 85025 COMPLETE CBC W/AUTO DIFF WBC: CPT | Performed by: EMERGENCY MEDICINE

## 2025-02-18 PROCEDURE — 25510000001 IOPAMIDOL PER 1 ML: Performed by: EMERGENCY MEDICINE

## 2025-02-18 PROCEDURE — 74183 MRI ABD W/O CNTR FLWD CNTR: CPT

## 2025-02-18 RX ORDER — IOPAMIDOL 755 MG/ML
100 INJECTION, SOLUTION INTRAVASCULAR
Status: COMPLETED | OUTPATIENT
Start: 2025-02-18 | End: 2025-02-18

## 2025-02-18 RX ORDER — ONDANSETRON 2 MG/ML
4 INJECTION INTRAMUSCULAR; INTRAVENOUS ONCE
Status: COMPLETED | OUTPATIENT
Start: 2025-02-18 | End: 2025-02-18

## 2025-02-18 RX ORDER — SODIUM CHLORIDE 0.9 % (FLUSH) 0.9 %
10 SYRINGE (ML) INJECTION AS NEEDED
Status: DISCONTINUED | OUTPATIENT
Start: 2025-02-18 | End: 2025-02-18 | Stop reason: HOSPADM

## 2025-02-18 RX ADMIN — GADOBENATE DIMEGLUMINE 14 ML: 529 INJECTION, SOLUTION INTRAVENOUS at 10:04

## 2025-02-18 RX ADMIN — IOPAMIDOL 100 ML: 755 INJECTION, SOLUTION INTRAVENOUS at 06:21

## 2025-02-18 RX ADMIN — MORPHINE SULFATE 4 MG: 4 INJECTION, SOLUTION INTRAMUSCULAR; INTRAVENOUS at 05:11

## 2025-02-18 RX ADMIN — ONDANSETRON 4 MG: 2 INJECTION INTRAMUSCULAR; INTRAVENOUS at 05:10

## 2025-02-18 NOTE — ED PROVIDER NOTES
Subjective   History of Present Illness  Patient presents complaining of abdominal pain that she has had for many years but tonight became very intense.  Patient feels that her epigastrium and right upper quadrant.  Nothing seems to make it better or worse.  Patient has follow-up with surgery coming up for possible gallbladder removal.  Patient denies any recent travel, sick contacts, bad food exposure, or trauma.        Review of Systems   All other systems reviewed and are negative.      Past Medical History:   Diagnosis Date    Abnormal Pap smear of cervix 2018    ASCUS + HPV =- no follow up    Anxiety     Asthma     Cervical dysplasia     RUMA 2 on bx, neg LEEP, RUMA 3 on CKC, neg margins    Depression     GERD (gastroesophageal reflux disease)     S/P LEEP 2020       Allergies   Allergen Reactions    Amoxicillin Itching       Past Surgical History:   Procedure Laterality Date    CERVICAL BIOPSY  W/ LOOP ELECTRODE EXCISION      CERVICAL CONIZATION N/A 2022    Procedure: CERVICAL CONIZATION;  Surgeon: Sarah Berger MD;  Location: [facility];  Service: Obstetrics/Gynecology;  Laterality: N/A;     SECTION      placenta previa     SECTION       SECTION      ENDOSCOPY N/A 2021    Procedure: ESOPHAGOGASTRODUODENOSCOPY with biopsies;  Surgeon: Lee Tubbs MD;  Location: [facility] OR;  Service: Gastroenterology;  Laterality: N/A;  Reflux  Biopsies: gastric, distal esophagus    ENDOSCOPY N/A 2024    Procedure: ESOPHAGOGASTRODUODENOSCOPY WITH BIOPSY;  Surgeon: Lee Tubbs MD;  Location: [facility] OR;  Service: Gastroenterology;  Laterality: N/A;  gastritis- gastric biopsy, esophagitis-distal esophagus biopsy    LEEP N/A 2020    Procedure: LOOP ELECTROCAUTERY EXCISION PROCEDURE;  Surgeon: Sarah Berger MD;  Location: [facility] OR;  Service: Obstetrics/Gynecology;  Laterality: N/A;    TOTAL LAPAROSCOPIC HYSTERECTOMY  SALPINGECTOMY N/A 8/15/2023    Procedure: TOTAL LAPAROSCOPIC HYSTERECTOMY , bilateral salpingectomy, lysis of adhesions;  Surgeon: Sarah Berger MD;  Location: [facility];  Service: Obstetrics/Gynecology;  Laterality: N/A;       Family History   Problem Relation Age of Onset    Breast cancer Neg Hx     Ovarian cancer Neg Hx     Uterine cancer Neg Hx     Colon cancer Neg Hx     Prostate cancer Neg Hx     Deep vein thrombosis Neg Hx     Malig Hyperthermia Neg Hx        Social History     Socioeconomic History    Marital status:     Number of children: 2   Tobacco Use    Smoking status: Never    Smokeless tobacco: Never   Vaping Use    Vaping status: Never Used   Substance and Sexual Activity    Alcohol use: Never    Drug use: Never    Sexual activity: Not Currently     Partners: Male     Birth control/protection: Hysterectomy     Comment: 8/2023- TLH/BS with OC           Objective   Physical Exam  Vitals and nursing note reviewed.   HENT:      Head: Normocephalic.      Mouth/Throat:      Pharynx: Oropharynx is clear.   Cardiovascular:      Rate and Rhythm: Normal rate and regular rhythm.      Heart sounds: Normal heart sounds.   Pulmonary:      Effort: Pulmonary effort is normal.      Breath sounds: Normal breath sounds.   Abdominal:      General: Abdomen is protuberant. There is no distension.      Palpations: Abdomen is soft.      Tenderness: There is abdominal tenderness in the right upper quadrant and epigastric area. There is no guarding.      Comments: Active bowel sounds in all 4 quadrants   Skin:     General: Skin is warm and dry.      Capillary Refill: Capillary refill takes 2 to 3 seconds.   Neurological:      Mental Status: She is alert and oriented to person, place, and time.   Psychiatric:         Mood and Affect: Mood normal.         Procedures           ED Course  ED Course as of 02/20/25 2347   Tue Feb 18, 2025   0653 Patient was symptomatic evidence of gallbladder disease.  No  evidence of acute cholecystitis with no fever or elevated white count but patient has evidence of multiple stones in the gallbladder and in the duct.  Patient still symptomatic.  Will page GI and hospitalist for admission. [AW]      ED Course User Index  [AW] Brian Jara MD                                                       Medical Decision Making  Ddx hiatal hernia, esophagitis, gastritis, gastroenteritis, hiatal hernia, cholecystitis, pancreatitis    CT Abdomen Pelvis With Contrast    Result Date: 2/18/2025  Impression: 1.Cholelithiasis with stones in the gallbladder and cystic duct and possibly in the proximal common bile duct. There is no biliary ductal dilatation. 2.Constipation with fecalization of distal small bowel loops likely from small bowel stasis. Electronically Signed: Hank Shell MD  2/18/2025 6:46 AM EST  Workstation ID: [id number]    Labs Reviewed  COMPREHENSIVE METABOLIC PANEL - Abnormal; Notable for the following components:     Glucose                       111 (*)                Creatinine                    0.54 (*)               Calcium                       8.2 (*)                BUN/Creatinine Ratio          31.5 (*)            All other components within normal limits         Narrative: GFR Categories in Chronic Kidney Disease (CKD)                                      GFR Category          GFR (mL/min/1.73)    Interpretation                  G1                     90 or greater         Normal or high (1)                  G2                      60-89                Mild decrease (1)                  G3a                   45-59                Mild to moderate decrease                  G3b                   30-44                Moderate to severe decrease                  G4                    15-29                Severe decrease                  G5                    14 or less           Kidney failure                                          (1)In the absence of evidence of kidney  disease, neither GFR category G1 or G2 fulfill the criteria for CKD.                                    eGFR calculation 2021 CKD-EPI creatinine equation, which does not include race as a factor  CBC WITH AUTO DIFFERENTIAL - Abnormal; Notable for the following components:     WBC                           10.85 (*)               Immature Grans %              1.0 (*)                Lymphocytes, Absolute         3.16 (*)               Monocytes, Absolute           0.95 (*)               Immature Grans, Absolute      0.11 (*)            All other components within normal limits  LIPASE - Normal  CBC AND DIFFERENTIAL      Problems Addressed:  Calculus of gallbladder without cholecystitis without obstruction: complicated acute illness or injury    Amount and/or Complexity of Data Reviewed  Labs: ordered.  Radiology: ordered.    Risk  Prescription drug management.        Final diagnoses:   Calculus of gallbladder without cholecystitis without obstruction       ED Disposition  ED Disposition       ED Disposition   Discharge    Condition   Stable    Comment   --               [facility] EMERGENCY DEPARTMENT  [address], [place] [address] [phone]    If symptoms worsen, if pain returns    Javi Beckwith MD  [address]  [facility] [address]  [phone]    Schedule an appointment as soon as possible for a visit in 1 day  For re-check         Medication List      No changes were made to your prescriptions during this visit.            Brian Jara MD  02/18/25 6651       Brian Jara MD  02/20/25 6506

## 2025-02-18 NOTE — Clinical Note
[facility] EMERGENCY DEPARTMENT  [address] [address]  Phone: [phone]    Luis Felipe Kraus accompanied Marilee Kraus to the emergency department on 2/18/2025. They may return to work on 02/19/2025.        Thank you for choosing [facility].    Adama Rdz MD

## 2025-02-18 NOTE — ED PROVIDER NOTES
Patient was signed out to me pending Surgery and gastroenterology consult    MRI abdomen w wo contrast mrcp    Result Date: 2/18/2025  MRI ABDOMEN W WO CONTRAST MRCP Date of Exam: 2/18/2025 9:35 AM EST Indication: hx of cholelithiasis now with possible stones in the bile duct.  Comparison: CT abdomen pelvis 2/18/2025 Technique:  Routine multiplanar/multisequence images of the abdomen were obtained with MRCP sequences before and after the uneventful administration of Multihance. Findings: Heart size normal. No pericardial effusion. Lower lungs grossly clear. Normal size and contour liver and spleen. No significant metal or fat deposition. No suspicious liver lesion. Portal vasculature, splenic vein and superior mesenteric vein patent. Numerous gallstones without gallbladder distention, wall thickening or pericholecystic fluid. Single stone near junction of gallbladder neck and cystic duct MRCP image 43 series 12. Mild dilation of extrahepatic biliary tree for age, common bile duct measuring up to 9 mm with blunted appearance of the ampulla. No suspicious  mass or filling defects. Normal size, contour and intrinsic T1 signal intensity of the pancreas. No active inflammation, main duct dilation, divisum morphology or suspicious mass. Spleen and adrenal glands unremarkable. Symmetric renal size, contour and enhancement. No solid appearing mass or overt hydronephrosis. Moderately distended urinary bladder. Expected configuration stomach and duodenum. No evidence of bowel obstruction or active inflammation. Formed stool in the cecum with fecalized distal small bowel, which can reflect slow transit clinically. Normal caliber abdominal aorta. No adenopathy. No ascites or drainable fluid collection. Questionable tiny fat-containing umbilical hernia. No acute abdominal wall findings. No infiltrative marrow process.     Impression: 1. Cholelithiasis without MRI findings of acute cholecystitis. Stones in the cystic duct better  visualized on prior CT. 2. Dilated extrahepatic biliary tree for age with blunted appearance of the ampulla. No visualized mass or choledocholithiasis. In the setting of a normal bilirubin findings could reflect a nonobstructing stricture, biliary type and sphincter of Oddi dysfunction, recently passed stone and less likely occult lesion. Consider ERCP based on clinical factors. 3. No evidence of gallstone pancreatitis. Electronically Signed: Nathan Parsons MD  2/18/2025 10:41 AM EST  Workstation ID: [id number]    CT Abdomen Pelvis With Contrast    Result Date: 2/18/2025  CT ABDOMEN PELVIS W CONTRAST Date of Exam: 2/18/2025 6:08 AM EST Indication: ruq pain. Comparison: CT abdomen pelvis January 25, 2021; CT abdomen pelvis from outside imaging facility at 1/16/2025 Technique: Axial CT images were obtained of the abdomen and pelvis following the uneventful intravenous administration of iodinated contrast. Sagittal and coronal reconstructions were performed.  Automated exposure control and iterative reconstruction methods were used. Findings: Lung Bases:   There is mild bilateral basilar atelectasis and interstitial prominence similar to the prior study. Liver: Liver is normal in size and CT density. No focal lesions. Biliary/Gallbladder:  There are multiple stones in the dependent aspect of the gallbladder with stones appearing to be within the cystic duct and perhaps in the proximal common bile duct. There is no intrahepatic biliary ductal dilatation or common bile duct dilatation. There  is no pericholecystic fluid or definitive gallbladder wall thickening. Spleen: Spleen is normal in size and CT density. Pancreas:  Pancreas is normal. There is no evidence of pancreatic mass or peripancreatic fluid. Kidneys:  Kidneys are normal in size. There are no stones or hydronephrosis. Adrenals:  Adrenal glands are unremarkable. Retroperitoneal/Lymph Nodes/Vasculature:  No retroperitoneal adenopathy is identified.  Gastrointestinal/Mesentery:  The bowel loops are non-dilated without wall thickening or mass. There is stool throughout the colon with stool to the cecum. There is some fecalization of distal small bowel loops which is likely from small bowel stasis secondary to constipation. The appendix appears within normal limits. No evidence of obstruction. No free air. No mesenteric fluid collections identified. There is a periumbilical hernia containing only fat. Bladder:  The bladder is normal. Genital:   Unremarkable       Bony Structures:   Visualized bony structures are consistent with the patient's age.     Impression: 1.Cholelithiasis with stones in the gallbladder and cystic duct and possibly in the proximal common bile duct. There is no biliary ductal dilatation. 2.Constipation with fecalization of distal small bowel loops likely from small bowel stasis. Electronically Signed: Hank Shell MD  2/18/2025 6:46 AM EST  Workstation ID: [id number]            After speaking with both surgery and gastroenterology, they recommended MRCP given normal LFTs, low suspicion for choledocholithiasis at this time.  MRI confirmed cholelithiasis, no evidence of choledocholithiasis.  Labs reassuring.  After reassessment patient's pain is completely resolved.  She is able to tolerate p.o.  At this time recommend close follow-up with general surgery.  Discussed that anytime if symptoms return she needs to seek reevaluation in the ER additionally she develops fevers, chills, nausea, vomiting or jaundice she needs to return immediately.  Otherwise I recommend calling her surgeon today to schedule close follow-up.      MD Kendell Mo Calyn Michele, MD  02/18/25 [id number]

## 2025-02-18 NOTE — Clinical Note
[facility] EMERGENCY DEPARTMENT  [address]  [place] [address]  Phone: [phone]    Andres Reyes accompanied Marilee Kraus to the emergency department on 2/18/2025. They may return to work on 02/19/2025.        Thank you for choosing [facility].    Adama Rdz MD

## 2025-02-18 NOTE — DISCHARGE INSTRUCTIONS
Please call to schedule follow-up appointment with Dr. Beckwith   Return to the ER for your pain returns, you have fevers, chills, nausea or vomiting

## 2025-02-18 NOTE — Clinical Note
[facility] EMERGENCY DEPARTMENT  [address] [address]  Phone: [phone]    Marilee Kraus was seen and treated in our emergency department on 2/18/2025.  She may return to work on 02/20/2025.         Thank you for choosing [facility].    Adama Rdz MD

## 2025-03-14 ENCOUNTER — ANESTHESIA EVENT (OUTPATIENT)
Dept: PERIOP | Facility: HOSPITAL | Age: 45
End: 2025-03-14
Payer: COMMERCIAL

## 2025-03-17 ENCOUNTER — ANESTHESIA (OUTPATIENT)
Dept: PERIOP | Facility: HOSPITAL | Age: 45
End: 2025-03-17
Payer: COMMERCIAL

## 2025-03-17 ENCOUNTER — APPOINTMENT (OUTPATIENT)
Dept: GENERAL RADIOLOGY | Facility: HOSPITAL | Age: 45
End: 2025-03-17
Payer: COMMERCIAL

## 2025-03-17 ENCOUNTER — HOSPITAL ENCOUNTER (OUTPATIENT)
Facility: HOSPITAL | Age: 45
Setting detail: HOSPITAL OUTPATIENT SURGERY
Discharge: HOME | End: 2025-03-17
Attending: SURGERY | Admitting: SURGERY
Payer: COMMERCIAL

## 2025-03-17 VITALS
SYSTOLIC BLOOD PRESSURE: 94 MMHG | WEIGHT: 163 LBS | DIASTOLIC BLOOD PRESSURE: 65 MMHG | RESPIRATION RATE: 16 BRPM | OXYGEN SATURATION: 94 % | HEART RATE: 94 BPM | TEMPERATURE: 98 F | BODY MASS INDEX: 26.31 KG/M2

## 2025-03-17 DIAGNOSIS — K80.20 CALCULUS OF GALLBLADDER WITHOUT CHOLECYSTITIS WITHOUT OBSTRUCTION: ICD-10-CM

## 2025-03-17 PROCEDURE — 74300 X-RAY BILE DUCTS/PANCREAS: CPT

## 2025-03-17 PROCEDURE — 25010000002 MIDAZOLAM PER 1MG: Performed by: ANESTHESIOLOGY

## 2025-03-17 PROCEDURE — 25010000002 CEFAZOLIN PER 500 MG: Performed by: SURGERY

## 2025-03-17 PROCEDURE — 47563 LAPARO CHOLECYSTECTOMY/GRAPH: CPT | Performed by: SPECIALIST/TECHNOLOGIST, OTHER

## 2025-03-17 PROCEDURE — S0260 H&P FOR SURGERY: HCPCS | Performed by: SURGERY

## 2025-03-17 PROCEDURE — 25010000002 LIDOCAINE 2% SOLUTION: Performed by: NURSE ANESTHETIST, CERTIFIED REGISTERED

## 2025-03-17 PROCEDURE — 25810000003 LACTATED RINGERS PER 1000 ML: Performed by: ANESTHESIOLOGY

## 2025-03-17 PROCEDURE — 25010000002 FENTANYL CITRATE (PF) 50 MCG/ML SOLUTION: Performed by: NURSE ANESTHETIST, CERTIFIED REGISTERED

## 2025-03-17 PROCEDURE — 25010000002 ONDANSETRON PER 1 MG: Performed by: ANESTHESIOLOGY

## 2025-03-17 PROCEDURE — 47563 LAPARO CHOLECYSTECTOMY/GRAPH: CPT | Performed by: SURGERY

## 2025-03-17 PROCEDURE — 25010000002 HYDROMORPHONE 1 MG/ML SOLUTION: Performed by: NURSE ANESTHETIST, CERTIFIED REGISTERED

## 2025-03-17 PROCEDURE — 88304 TISSUE EXAM BY PATHOLOGIST: CPT | Performed by: SURGERY

## 2025-03-17 PROCEDURE — 25010000002 KETOROLAC TROMETHAMINE PER 15 MG: Performed by: NURSE ANESTHETIST, CERTIFIED REGISTERED

## 2025-03-17 PROCEDURE — 25010000002 PROPOFOL 200 MG/20ML EMULSION: Performed by: NURSE ANESTHETIST, CERTIFIED REGISTERED

## 2025-03-17 PROCEDURE — 25010000002 SUCCINYLCHOLINE PER 20 MG: Performed by: NURSE ANESTHETIST, CERTIFIED REGISTERED

## 2025-03-17 PROCEDURE — 25010000002 SUGAMMADEX 200 MG/2ML SOLUTION: Performed by: NURSE ANESTHETIST, CERTIFIED REGISTERED

## 2025-03-17 PROCEDURE — 25010000002 DEXAMETHASONE PER 1 MG: Performed by: ANESTHESIOLOGY

## 2025-03-17 PROCEDURE — C1894 INTRO/SHEATH, NON-LASER: HCPCS | Performed by: SURGERY

## 2025-03-17 DEVICE — HORIZON TI ML 6 CLIPS/CART
Type: IMPLANTABLE DEVICE | Site: ABDOMEN | Status: FUNCTIONAL
Brand: WECK

## 2025-03-17 RX ORDER — SODIUM CHLORIDE 0.9 % (FLUSH) 0.9 %
10 SYRINGE (ML) INJECTION EVERY 12 HOURS SCHEDULED
Status: DISCONTINUED | OUTPATIENT
Start: 2025-03-17 | End: 2025-03-17 | Stop reason: HOSPADM

## 2025-03-17 RX ORDER — SODIUM CHLORIDE 9 MG/ML
40 INJECTION, SOLUTION INTRAVENOUS AS NEEDED
Status: DISCONTINUED | OUTPATIENT
Start: 2025-03-17 | End: 2025-03-17 | Stop reason: HOSPADM

## 2025-03-17 RX ORDER — HYDROCODONE BITARTRATE AND ACETAMINOPHEN 5; 325 MG/1; MG/1
1 TABLET ORAL EVERY 4 HOURS PRN
Qty: 10 TABLET | Refills: 0 | Status: SHIPPED | OUTPATIENT
Start: 2025-03-17

## 2025-03-17 RX ORDER — ROCURONIUM BROMIDE 10 MG/ML
INJECTION, SOLUTION INTRAVENOUS AS NEEDED
Status: DISCONTINUED | OUTPATIENT
Start: 2025-03-17 | End: 2025-03-17 | Stop reason: SURG

## 2025-03-17 RX ORDER — FAMOTIDINE 10 MG/ML
20 INJECTION, SOLUTION INTRAVENOUS
Status: COMPLETED | OUTPATIENT
Start: 2025-03-17 | End: 2025-03-17

## 2025-03-17 RX ORDER — SODIUM CHLORIDE, SODIUM LACTATE, POTASSIUM CHLORIDE, CALCIUM CHLORIDE 600; 310; 30; 20 MG/100ML; MG/100ML; MG/100ML; MG/100ML
9 INJECTION, SOLUTION INTRAVENOUS CONTINUOUS
Status: DISCONTINUED | OUTPATIENT
Start: 2025-03-17 | End: 2025-03-17 | Stop reason: HOSPADM

## 2025-03-17 RX ORDER — HYDROCODONE BITARTRATE AND ACETAMINOPHEN 7.5; 325 MG/1; MG/1
1 TABLET ORAL ONCE AS NEEDED
Status: COMPLETED | OUTPATIENT
Start: 2025-03-17 | End: 2025-03-17

## 2025-03-17 RX ORDER — SODIUM CHLORIDE, SODIUM LACTATE, POTASSIUM CHLORIDE, CALCIUM CHLORIDE 600; 310; 30; 20 MG/100ML; MG/100ML; MG/100ML; MG/100ML
100 INJECTION, SOLUTION INTRAVENOUS ONCE
Status: DISCONTINUED | OUTPATIENT
Start: 2025-03-17 | End: 2025-03-17 | Stop reason: HOSPADM

## 2025-03-17 RX ORDER — KETOROLAC TROMETHAMINE 30 MG/ML
INJECTION, SOLUTION INTRAMUSCULAR; INTRAVENOUS AS NEEDED
Status: DISCONTINUED | OUTPATIENT
Start: 2025-03-17 | End: 2025-03-17 | Stop reason: SURG

## 2025-03-17 RX ORDER — LIDOCAINE HYDROCHLORIDE 20 MG/ML
INJECTION, SOLUTION INFILTRATION; PERINEURAL AS NEEDED
Status: DISCONTINUED | OUTPATIENT
Start: 2025-03-17 | End: 2025-03-17 | Stop reason: SURG

## 2025-03-17 RX ORDER — ONDANSETRON 2 MG/ML
4 INJECTION INTRAMUSCULAR; INTRAVENOUS ONCE AS NEEDED
Status: DISCONTINUED | OUTPATIENT
Start: 2025-03-17 | End: 2025-03-17 | Stop reason: HOSPADM

## 2025-03-17 RX ORDER — ONDANSETRON 2 MG/ML
4 INJECTION INTRAMUSCULAR; INTRAVENOUS ONCE AS NEEDED
Status: COMPLETED | OUTPATIENT
Start: 2025-03-17 | End: 2025-03-17

## 2025-03-17 RX ORDER — FENTANYL CITRATE 50 UG/ML
INJECTION, SOLUTION INTRAMUSCULAR; INTRAVENOUS AS NEEDED
Status: DISCONTINUED | OUTPATIENT
Start: 2025-03-17 | End: 2025-03-17 | Stop reason: SURG

## 2025-03-17 RX ORDER — SUCCINYLCHOLINE CHLORIDE 20 MG/ML
INJECTION INTRAMUSCULAR; INTRAVENOUS AS NEEDED
Status: DISCONTINUED | OUTPATIENT
Start: 2025-03-17 | End: 2025-03-17 | Stop reason: SURG

## 2025-03-17 RX ORDER — SODIUM CHLORIDE 0.9 % (FLUSH) 0.9 %
10 SYRINGE (ML) INJECTION AS NEEDED
Status: DISCONTINUED | OUTPATIENT
Start: 2025-03-17 | End: 2025-03-17 | Stop reason: HOSPADM

## 2025-03-17 RX ORDER — LIDOCAINE HYDROCHLORIDE 10 MG/ML
0.5 INJECTION, SOLUTION EPIDURAL; INFILTRATION; INTRACAUDAL; PERINEURAL ONCE AS NEEDED
Status: DISCONTINUED | OUTPATIENT
Start: 2025-03-17 | End: 2025-03-17 | Stop reason: HOSPADM

## 2025-03-17 RX ORDER — ONDANSETRON 4 MG/1
4 TABLET, FILM COATED ORAL EVERY 6 HOURS PRN
Qty: 10 TABLET | Refills: 0 | Status: SHIPPED | OUTPATIENT
Start: 2025-03-17

## 2025-03-17 RX ORDER — PROPOFOL 10 MG/ML
INJECTION, EMULSION INTRAVENOUS AS NEEDED
Status: DISCONTINUED | OUTPATIENT
Start: 2025-03-17 | End: 2025-03-17 | Stop reason: SURG

## 2025-03-17 RX ORDER — DEXAMETHASONE SODIUM PHOSPHATE 4 MG/ML
8 INJECTION, SOLUTION INTRA-ARTICULAR; INTRALESIONAL; INTRAMUSCULAR; INTRAVENOUS; SOFT TISSUE ONCE AS NEEDED
Status: COMPLETED | OUTPATIENT
Start: 2025-03-17 | End: 2025-03-17

## 2025-03-17 RX ORDER — MIDAZOLAM HYDROCHLORIDE 2 MG/2ML
1 INJECTION, SOLUTION INTRAMUSCULAR; INTRAVENOUS
Status: DISCONTINUED | OUTPATIENT
Start: 2025-03-17 | End: 2025-03-17 | Stop reason: HOSPADM

## 2025-03-17 RX ADMIN — SODIUM CHLORIDE, SODIUM LACTATE, POTASSIUM CHLORIDE, AND CALCIUM CHLORIDE 9 ML/HR: 600; 310; 30; 20 INJECTION, SOLUTION INTRAVENOUS at 09:17

## 2025-03-17 RX ADMIN — HYDROMORPHONE HYDROCHLORIDE 0.5 MG: 1 INJECTION, SOLUTION INTRAMUSCULAR; INTRAVENOUS; SUBCUTANEOUS at 12:55

## 2025-03-17 RX ADMIN — MIDAZOLAM HYDROCHLORIDE 1 MG: 1 INJECTION, SOLUTION INTRAMUSCULAR; INTRAVENOUS at 10:09

## 2025-03-17 RX ADMIN — HYDROMORPHONE HYDROCHLORIDE 0.5 MG: 1 INJECTION, SOLUTION INTRAMUSCULAR; INTRAVENOUS; SUBCUTANEOUS at 13:06

## 2025-03-17 RX ADMIN — LIDOCAINE HYDROCHLORIDE 50 MG: 20 INJECTION, SOLUTION INFILTRATION; PERINEURAL at 10:54

## 2025-03-17 RX ADMIN — CEFAZOLIN 2000 MG: 2 INJECTION, POWDER, FOR SOLUTION INTRAVENOUS at 10:56

## 2025-03-17 RX ADMIN — ROCURONIUM 10 MG: 50 INJECTION, SOLUTION INTRAVENOUS at 11:46

## 2025-03-17 RX ADMIN — DEXMEDETOMIDINE 5 MCG: 100 INJECTION, SOLUTION, CONCENTRATE INTRAVENOUS at 10:55

## 2025-03-17 RX ADMIN — FENTANYL CITRATE 50 MCG: 50 INJECTION, SOLUTION INTRAMUSCULAR; INTRAVENOUS at 10:55

## 2025-03-17 RX ADMIN — HYDROCODONE BITARTRATE AND ACETAMINOPHEN 1 TABLET: 7.5; 325 TABLET ORAL at 12:56

## 2025-03-17 RX ADMIN — SUCCINYLCHOLINE CHLORIDE 100 MG: 20 INJECTION, SOLUTION INTRAMUSCULAR; INTRAVENOUS at 10:56

## 2025-03-17 RX ADMIN — PROPOFOL 200 MG: 10 INJECTION, EMULSION INTRAVENOUS at 10:55

## 2025-03-17 RX ADMIN — FAMOTIDINE 20 MG: 10 INJECTION INTRAVENOUS at 09:17

## 2025-03-17 RX ADMIN — FENTANYL CITRATE 50 MCG: 50 INJECTION, SOLUTION INTRAMUSCULAR; INTRAVENOUS at 11:07

## 2025-03-17 RX ADMIN — SUGAMMADEX 200 MG: 100 INJECTION, SOLUTION INTRAVENOUS at 12:08

## 2025-03-17 RX ADMIN — ROCURONIUM 30 MG: 50 INJECTION, SOLUTION INTRAVENOUS at 10:59

## 2025-03-17 RX ADMIN — ONDANSETRON 4 MG: 2 INJECTION INTRAMUSCULAR; INTRAVENOUS at 09:17

## 2025-03-17 RX ADMIN — DEXAMETHASONE SODIUM PHOSPHATE 8 MG: 4 INJECTION INTRA-ARTICULAR; INTRALESIONAL; INTRAMUSCULAR; INTRAVENOUS; SOFT TISSUE at 09:16

## 2025-03-17 RX ADMIN — KETOROLAC TROMETHAMINE 30 MG: 30 INJECTION, SOLUTION INTRAMUSCULAR; INTRAVENOUS at 11:10

## 2025-03-17 NOTE — ANESTHESIA POSTPROCEDURE EVALUATION
Patient: Marilee Kraus    Procedure Summary       Date: 03/17/25 Room / Location: [facility] 1 / [facility] OR; [facility] X[facility]    Anesthesia Start: 1046 Anesthesia Stop: 1224    Procedures:       Laparoscopic cholecystectomy with cholangiogram (Abdomen)      FL CHOLANGIOGRAM OPERATIVE Diagnosis:       Calculus of gallbladder without cholecystitis without obstruction      (Calculus of gallbladder without cholecystitis without obstruction [K80.20])      (cholangiogram in OR)    Scheduled Providers: Javi Beckwith MD Provider: Perla Lopez CRNA    Anesthesia Type: general ASA Status: 2            Anesthesia Type: general    Vitals  Vitals Value Taken Time   /65 03/17/25 12:40   Temp     Pulse 96 03/17/25 12:40   Resp 19 03/17/25 12:40   SpO2 98 % 03/17/25 12:40   Vitals shown include unfiled device data.        Post Anesthesia Care and Evaluation    Patient location during evaluation: PHASE II  Patient participation: complete - patient participated  Level of consciousness: awake  Pain management: adequate    Airway patency: patent  Anesthetic complications: No anesthetic complications  PONV Status: none  Cardiovascular status: acceptable  Respiratory status: acceptable  Hydration status: acceptable

## 2025-03-17 NOTE — H&P
The following note was reviewed and there have been no substantive changes:    ASSESSMENT/PLAN:    45-year-old lady with symptomatic cholelithiasis, wishes to proceed with laparoscopic cholecystectomy.  She understands the nature of the procedure and the risks including but not limited to bleeding, infection, conversion open procedure, and the bowel function changes which can accompany cholecystectomy.     CC:        Abdominal pain     HPI:    45-year-old lady with several year history of intermittent upper abdominal pain associated with nausea and vomiting.  Relevant review of systems negative other than presenting complaints.     RADIOLOGY:   CT abdomen pelvis 2025 at [facility] demonstrated gallstones.  On my review of images, there was a 2.5 cm gallstone in the fundus of the gallbladder as well as multiple calcified stones in the neck and possibly cystic duct.     LABS:    CMP 10/23/2024: ALT 35, AST 36, otherwise normal  CBC 10/23/2024: Normal     SOCIAL HISTORY:   Denies tobacco use  Denies alcohol use     FAMILY HISTORY:    Colorectal cancer: Negative     PREVIOUS ABDOMINAL SURGERY    Total abdominal hysterectomy 8/15/2023   sections     PAST MEDICAL HISTORY:    Gastroesophageal reflux disease     MEDICATIONS:   Acyclovir  Albuterol  Protonix     ALLERGIES:   Amoxicillin-itching     PHYSICAL EXAM:   Constitutional: No acute distress  Vital signs:   Weight: 155 pounds  Height: 63 inches  BMI: 27.5  BP 98/68  Heart rate 83  Respiratory 13  Temperature 98  Respiratory: Normal nonlabored inspiratory effort  Cardiovascular: Regular rate, no jugular venous distention  Gastrointestinal: Soft and nontender     HENRY OCAMPO M.D.

## 2025-03-17 NOTE — OP NOTE
PREOPERATIVE DIAGNOSIS:  Symptomatic cholelithiasis    POSTOPERATIVE DIAGNOSIS (FINDINGS):  Symptomatic cholelithiasis with multiple stones within the cystic duct extending to the common bile duct    PROCEDURE:  Laparoscopic cholecystectomy with cholangiogram    SURGEON:  Javi Beckwith MD    ASSISTANT:  Basilia Grace was responsible for performing the following activities: suction, irrigation, suturing, closing, retraction, camera holding, and placing dressing, and their skilled assistance was necessary for the success of this case.    ANESTHESIA:  General    EBL:  Minimal    SPECIMEN(S):  Gallbladder    DESCRIPTION:  In supine position under general anesthetic prepped and draped usual sterile manner.  Half percent Marcaine with epinephrine infiltrated locally at all incision sites.  Small incision made above the umbilicus and Veress needle was inserted with upward traction on the abdominal wall.  Abdomen was insufflated 15 mmHg and a 5 mm Optiview trocar was inserted followed by subxiphoid 11 and 2 right subcostal 5 mm trocar's.  Gallbladder was grasped and elevated and cystic duct was dissected.  Clip was placed proximally and small opening was made in the cystic duct and cholangiogram catheter was inserted.  Flushing of the cholangiogram catheter resulted in leakage around the insertion site and after replacing the cholangiogram catheter and reclipping it the saline still leaked indicative of obstruction of the cystic duct.  Dissection of the cystic duct was carried carefully right to the common bile duct and multiple stones were impacting the cystic duct all the way to the common bile duct.  I opened the cystic duct more proximally to allow complete extraction of all stones.  At that point a cholangiogram catheter was placed and cholangiogram showed prompt filling of the duodenum and no filling defects and normal proximal biliary tree.  The cystic duct was clipped immediately past the common duct and the common  duct was well-visualized with no narrowing of the common duct from the clips.  Cystic artery was clipped twice proximally and once distally and divided.  Gallbladder was removed from the liver bed with electrocautery and placed in an Endo Catch bag and removed through the subxiphoid trocar site.  Right upper quadrant was copiously irrigated and aspirated and good hemostasis was noted.  CO2 was released.  Fascia of the subxiphoid trocar site was closed with 0 Vicryl.  Skin edges were closed with 5-0 Vicryl subcuticular followed by Exofin.  Tolerated well.    Javi Beckwith M.D.

## 2025-03-17 NOTE — ANESTHESIA PREPROCEDURE EVALUATION
Anesthesia Evaluation     Patient summary reviewed and Nursing notes reviewed   no history of anesthetic complications:   NPO Solid Status: > 8 hours  NPO Liquid Status: > 8 hours           Airway   Mallampati: II  TM distance: >3 FB  Neck ROM: full  No difficulty expected  Dental          Pulmonary    (+) asthma (rescue inhaler use with cold weather),  Cardiovascular   Exercise tolerance: good (4-7 METS)        Neuro/Psych- negative ROS  GI/Hepatic/Renal/Endo    (+) GERD well controlled    Musculoskeletal (-) negative ROS    Abdominal    Substance History - negative use     OB/GYN negative ob/gyn ROS         Other - negative ROS                     Anesthesia Plan    ASA 2     general     (970900  used)  intravenous induction     Anesthetic plan, risks, benefits, and alternatives have been provided, discussed and informed consent has been obtained with: patient.    Use of blood products discussed with patient  Consented to blood products.    Plan discussed with CRNA.      CODE STATUS:

## 2025-03-17 NOTE — LETTER
March 17, 2025     Patient: Marilee Kraus   YOB: 1980   Date of Visit: 3/17/2025       To Whom It May Concern:    It is my medical opinion that Marilee Kraus may return to work on March 31st 2025.           Sincerely,      Chidi MCKEON

## 2025-03-17 NOTE — DISCHARGE INSTRUCTIONS
Dr. Javi Beckwith  [address]  [place] [address]  [phone]    Instrucciones de eber para cirugía de vesícula biliar    1. Regrese a casa, descanse y tómelo con calma hoy; sin embargo, debe levantarse y moverse varias veces hoy para reducir el riesgo de desarrollar un coágulo en las piernas.    2. Es posible que experimente mareos o pérdida de memoria debido a la anestesia. [place] puede durar las próximas 24 horas. Alguien debe estar con usted maykel las primeras 24 horas para villa seguridad.    3. No tome decisiones legales importantes ni firme documentos legales maykel las próximas 24 horas.    4. Coma y ap poco hoy. Comience con líquidos, gelatina, sopa, galletas u otros alimentos blandos al principio. Puede aumentar villa dieta mañana según lo tolere, siempre y cuando no experimente náuseas ni vómitos. Undefined  5. Si se usó adhesivo para la piel (Dermabond), yen incisiones estarán protegidas y cubiertas. El adhesivo invisible se disolverá por sí solo a medida que cicatrice la incisión. Si tiene vendajes, puede retirar los vendajes externos en 3 días. Las cintas king llamadas Steri-Strips deben permanecer en villa lugar. Se caerán solas en 1 o 2 semanas. No se preocupe si se caen antes.    6. Si tiene vendajes, es posible que note algo de sangrado o supuración en los vendajes externos. Es normal que haya un poco de philippe supurando. Si el sangrado o supuración es asia que el vendaje no puede absorberlo, retire el vendaje, aplique leopoldo gasa limpia y presione firmemente maykel 15 minutos. Si el sangrado continúa, por favor, llámeme.    7. Puede ducharse mañana dejando correr el agua sobre las incisiones; sin embargo, no las frote. No se bañe en la obey hasta que las incisiones estén completamente curadas. Undefined  8. Le roper recetado un analgésico narcótico, ya que sentirá algo de dolor después de la cirugía. No estará completamente martin de dolor, radha el analgésico debería hacerlo  tolerable. [place] yen analgésicos según lo recetado y siempre con alimentos para evitar las náuseas. Si tiene un dolor intenso que no se controla con el analgésico, por favor, contácteme.    9. También le roper recetado un medicamento contra las náuseas. Tómelo según lo recetado para cualquier náusea o vómito. Las náuseas pueden ser resultado de la anestesia o del analgésico narcótico. Si experimenta náuseas y vómitos intensos que no se controlan con el analgésico, por favor, llámeme.    10. No es raro sentir dolor o molestias en los hombros o debajo de las costillas después de la cirugía. [place] se debe al gas utilizado maykel el procedimiento laparoscópico y suele durar de 1 a 3 días. El analgésico recetado se utiliza para tratar el dolor quirúrgico y no suele aliviar lakia dolor gaseoso. 11. No puede conducir maykel 24 horas ni mientras esté tomando yen analgésicos recetados.    12. Deberá llamar a la oficina al [phone] para programar leopoldo antoine de seguimiento en 6 a 10 días.    13. Recuerde contactarme si presenta alguno de los siguientes síntomas:     Fiebre > 38 °C   Dolor intenso que no se controla con analgésicos   Náuseas o vómitos intensos que no se controlan con analgésicos   Sangrado significativo en las incisiones   Supuración con mal olor o de color amarillo o jennifer   Cualquier otra pregunta o inquietud    Dr. Javi Beckwith  [address] [place] [address] [address]  [place] [id number] [phone]    Discharge Instructions for Gall Bladder Surgery    Go home, rest and take it easy today; however, you should get up and move about several times today to reduce the risk of developing a clot in your legs.      You may experience some dizziness or memory loss from the anesthesia.  This may last for the next 24 hours.  Someone should plan on staying with you for the first 24 hours for your safety.    Do not make any important legal decisions or sign any legal papers for the next 24 hours.      Eat and drink lightly today.   Start off with liquids, jello, soup, crackers or other bland foods at first. You may advance your diet tomorrow as tolerated as long as you do not experience any nausea or vomiting.     If skin glue (Dermabond) was used, your incisions are protected and covered.  The invisible glue will dissolve on its own as your incision heals. If you have dressings, you may remove your outer dressings in 3 days.  The white tapes called steri-strips should stay in place.  They will fall off on their own in 1-2 weeks.  Do not worry if they come off sooner.      If you have dressings, you may notice some bleeding/drainage on your outer dressings. A little bloody drainage is normal. If the bleeding/drainage is such that the bandage cannot absorb it, remove the dressing, apply clean gauze and apply firm pressure for a full 15 minutes.  If the bleeding continues, please call me.    You may shower tomorrow allowing water to run over the incisions; however, do not scrub the incisions.   No tub baths until your incisions are completely healed.         You have received a prescription for a narcotic pain medicine, as you will have some pain following surgery.   You will not be totally pain free, but your pain medicine should make the pain tolerable.  Please take your pain medicine as prescribed and always take your pills with food to prevent nausea. If you are having severe pain that cannot be controlled by the pain medicine, please contact me.      You have also received a prescription for an anti-nausea medicine.  Please take this as prescribed for any nausea or vomiting.  Nausea could be a result of the anesthesia or a result of the narcotic pain medicine.  If you experience severe nausea and vomiting that cannot be controlled by the nausea medicine, please call me.      It is not unusual to experience pain/discomfort in your shoulders or under your ribs after surgery.  It is from the gas used during the laparoscopic procedure and  usually lasts 1-3 days.  The prescription pain medicine is used to treat the surgical pain and does not typically alleviate this “gassy” pain.     No driving for 24 hours and for as long as you are taking your prescription pain medicine.      You will need to call the office at [phone] to schedule a follow-up appointment in 6-10 days.     Remember to contact me for any of the following:    Fever > 101 degrees  Severe pain that cannot be controlled by taking your pain pills  Severe nausea or vomiting that cannot be controlled by taking your nausea pills  Significant bleeding of your incisions  Drainage that has a bad smell or is yellow or green in appearance  Any other questions or concerns

## 2025-03-24 ENCOUNTER — OFFICE VISIT (OUTPATIENT)
Dept: OBSTETRICS AND GYNECOLOGY | Facility: CLINIC | Age: 45
End: 2025-03-24
Payer: COMMERCIAL

## 2025-03-24 VITALS
DIASTOLIC BLOOD PRESSURE: 72 MMHG | BODY MASS INDEX: 26.2 KG/M2 | SYSTOLIC BLOOD PRESSURE: 100 MMHG | WEIGHT: 163 LBS | HEIGHT: 66 IN

## 2025-03-24 DIAGNOSIS — R87.622 LGSIL PAP SMEAR OF VAGINA: Primary | ICD-10-CM

## 2025-03-24 NOTE — PROGRESS NOTES
Colposcopy Procedure Note    Procedures          Indications: Most recent Pap smear showed:  LGSIL of cuff, neg HPV    Prior cervical/vaginal disease: RUMA 3  Prior cervical treatment: LLETZ, cone, and hysterectomy.  Contraception: status post hysterectomy    Procedure Details   The risks and benefits of the procedure and Verbal informed consent obtained.  Pregnancy test: not done    Speculum placed in vagina and excellent visualization of cervix achieved, cervix swabbed x 3 with acetic acid solution and Lugol's solution     Findings:  Cuff : no visible lesions, no mosaicism, no punctation, and no abnormal vasculature; .  Vaginal inspection: normal without visible lesions.  Vulvar colposcopy: vulvar colposcopy not performed.  Colpo adequacy: was not adquate    Specimens: none    Colpo impression: normal     Complications: none.   Patient tolerated procedure well.     Smoking Status: No      Plan:  Repeat pap/HPV in 12/2025 at annual     Sarah Berger MD   3/24/2025  15:58 EDT

## 2025-03-26 ENCOUNTER — OFFICE VISIT (OUTPATIENT)
Dept: SURGERY | Facility: CLINIC | Age: 45
End: 2025-03-26
Payer: COMMERCIAL

## 2025-03-26 VITALS
WEIGHT: 163 LBS | DIASTOLIC BLOOD PRESSURE: 70 MMHG | BODY MASS INDEX: 26.2 KG/M2 | HEIGHT: 66 IN | SYSTOLIC BLOOD PRESSURE: 108 MMHG

## 2025-03-26 DIAGNOSIS — K80.20 CALCULUS OF GALLBLADDER WITHOUT CHOLECYSTITIS WITHOUT OBSTRUCTION: Primary | ICD-10-CM

## 2025-03-26 PROCEDURE — 99024 POSTOP FOLLOW-UP VISIT: CPT | Performed by: SURGERY

## 2025-03-26 NOTE — PROGRESS NOTES
Colorectal & General Surgery  Post - Op    Patient: Marilee Kraus  YOB: 1980  MRN: [id number]      Assessment  Marilee Kraus is a 45 y.o. female  status post recent laparoscopic cholecystectomy with intraoperative cholangiogram for symptomatic cholelithiasis.  She is recovering very well from surgery.  No postoperative issues identified.  I reiterated the importance of maintaining lifting restrictions given the physicality of her job.  I provided her a letter keeping her off work until April 14, 2025.    She is welcome to follow-up with us on an as-needed basis.    History of Present Illness   Marilee Kraus is a 45 y.o. female who presents for postoperative follow-up with no complaints today.    Vital Signs  Vitals:    03/26/25 0807   BP: 108/70        Physical Exam  Constitutional: Resting comfortably, no acute distress  Neck: Supple, trachea midline  Respiratory: No increased work of breathing, Symmetric excursion  Cardiovascular: Well pefursed, no jugular venous distention evident   Abdominal: Incisions are in good order.  Soft, non-tender, non-distended  Lymphatics: No cervical or suprascapular adenopathy  Skin: Warm, dry, no rash on visualized skin surfaces  Musculoskeletal: Symmetric strength, no obvious gross abnormalities  Psychiatric: Alert and oriented ×3, normal affect       Pathology  I have personally reviewed pathology results with the patient demonstrating cholelithiasis with cholecystitis.    Donato Dorantes MD  Colorectal & General Surgery  [facility]    [address], [address]  [place], [place], [address]  P: [phone]  F: [phone]

## 2025-04-10 ENCOUNTER — TELEPHONE (OUTPATIENT)
Dept: SURGERY | Facility: CLINIC | Age: 45
End: 2025-04-10
Payer: COMMERCIAL

## 2025-04-10 NOTE — TELEPHONE ENCOUNTER
Caller: Marilee Waller     Relationship: SELF     Best call back number: [phone]     What form or medical record are you requesting:  BACK TO WORK RELEASE     Who is requesting this form or medical record from you:  [employer]      PATIENT DID NOT PROVIDE FAX NUMBER  AND OPTED TO  WORK RELEASE FORM     Timeframe paperwork needed:     Additional notes:  PATIENT WOULD LIKE TO EXTEND BACK TO WORK RELEASE  FOR 05.05.25

## 2025-04-11 NOTE — TELEPHONE ENCOUNTER
Called patient using  [phone].  Let her know return to work note with return date of 05/05/25 has been created as she requested, and can pick-up form [place] today after 1pm.  She voiced understanding.

## 2025-04-25 ENCOUNTER — TELEPHONE (OUTPATIENT)
Dept: SURGERY | Facility: CLINIC | Age: 45
End: 2025-04-25
Payer: COMMERCIAL

## 2025-04-25 ENCOUNTER — OFFICE VISIT (OUTPATIENT)
Dept: SURGERY | Facility: CLINIC | Age: 45
End: 2025-04-25
Payer: COMMERCIAL

## 2025-04-25 VITALS
WEIGHT: 165.6 LBS | SYSTOLIC BLOOD PRESSURE: 110 MMHG | BODY MASS INDEX: 27.59 KG/M2 | HEIGHT: 65 IN | OXYGEN SATURATION: 98 % | HEART RATE: 102 BPM | DIASTOLIC BLOOD PRESSURE: 75 MMHG

## 2025-04-25 DIAGNOSIS — Z48.89 POSTOPERATIVE VISIT: Primary | ICD-10-CM

## 2025-04-25 PROCEDURE — 99024 POSTOP FOLLOW-UP VISIT: CPT | Performed by: SURGERY

## 2025-04-25 RX ORDER — MELOXICAM 7.5 MG/1
7.5 TABLET ORAL DAILY
Qty: 14 TABLET | Refills: 0 | Status: SHIPPED | OUTPATIENT
Start: 2025-04-25 | End: 2025-05-09

## 2025-04-25 NOTE — PROGRESS NOTES
She returns today with some ongoing pain in the epigastric region.  All of her incisions of healed well and her abdomen is soft and appropriately tender.  I think this is just some musculoskeletal pain and I gave her a prescription for 2 weeks of meloxicam with instructions to take no other pain medication while taking the meloxicam.  Return to work date May 5

## 2025-04-25 NOTE — TELEPHONE ENCOUNTER
The [facility] received a fax that requires your attention. The document has been indexed to the patient’s chart for your review.      Reason for sending: PLEASE REVIEW URGENT REQUEST FOR RECORDS FOR DISABILITY CLAIM    Documents Description: DISABILITY PAPERWORK 4.24.25    Name of Sender: [employer]    Date Indexed: 4.25.25    Notes (if needed):

## 2025-05-01 ENCOUNTER — TELEPHONE (OUTPATIENT)
Dept: SURGERY | Facility: CLINIC | Age: 45
End: 2025-05-01
Payer: COMMERCIAL

## 2025-05-01 NOTE — TELEPHONE ENCOUNTER
Called patient via [employer]  and informed her that her short-term disability form is faxed over and uploaded to her [employer]. Pt verbalized understanding.

## 2025-05-21 ENCOUNTER — CLINICAL SUPPORT (OUTPATIENT)
Dept: OBSTETRICS AND GYNECOLOGY | Facility: CLINIC | Age: 45
End: 2025-05-21
Payer: COMMERCIAL

## 2025-05-21 DIAGNOSIS — Z23 NEED FOR HPV VACCINATION: Primary | ICD-10-CM

## 2025-08-15 ENCOUNTER — HOSPITAL ENCOUNTER (OUTPATIENT)
Dept: MAMMOGRAPHY | Facility: HOSPITAL | Age: 45
Discharge: HOME | End: 2025-08-15
Admitting: OBSTETRICS & GYNECOLOGY
Payer: COMMERCIAL

## 2025-08-15 DIAGNOSIS — Z12.31 ENCOUNTER FOR SCREENING MAMMOGRAM FOR MALIGNANT NEOPLASM OF BREAST: ICD-10-CM

## 2025-08-15 PROCEDURE — 77067 SCR MAMMO BI INCL CAD: CPT | Performed by: RADIOLOGY

## 2025-08-15 PROCEDURE — 77063 BREAST TOMOSYNTHESIS BI: CPT | Performed by: RADIOLOGY

## 2025-08-15 PROCEDURE — 77063 BREAST TOMOSYNTHESIS BI: CPT

## 2025-08-15 PROCEDURE — 77067 SCR MAMMO BI INCL CAD: CPT

## (undated) DEVICE — SUCTION CANISTER, 1000CC,SAFELINER: Brand: DEROYAL

## (undated) DEVICE — SYR LUERLOK 30CC

## (undated) DEVICE — FRCP BX RADJAW4 NDL 2.8 240CM LG OG BX40

## (undated) DEVICE — SUT VIC 5/0 PS2 18IN J495H

## (undated) DEVICE — LAG PERI GYN: Brand: MEDLINE INDUSTRIES, INC.

## (undated) DEVICE — SYR LL TP 10ML STRL

## (undated) DEVICE — KT ORCA ORCAPOD DISP STRL

## (undated) DEVICE — CATH IV INSYTE AUTOGARD 14G 1 1/2IN ORNG

## (undated) DEVICE — PAD GRND REM POLYHESIVE A/ DISP

## (undated) DEVICE — MEDI-VAC YANK SUCT HNDL W/TPRD BULBOUS TIP: Brand: CARDINAL HEALTH

## (undated) DEVICE — BW-412T DISP COMBO CLEANING BRUSH: Brand: SINGLE USE COMBINATION CLEANING BRUSH

## (undated) DEVICE — JACKT LAB F/R KNIT CUFF/COLR XLG BLU

## (undated) DEVICE — SUT MNCRYL 4/0 PS2 18 IN

## (undated) DEVICE — SUT VIC 0 TN 27IN DYED JTN0G

## (undated) DEVICE — ENDOCUT SCISSOR TIP, DISPOSABLE: Brand: RENEW

## (undated) DEVICE — CONTAINER,SPECIMEN,OR STERILE,4OZ: Brand: MEDLINE

## (undated) DEVICE — LINER SURG CANSTR SXN S/RIGD 1500CC

## (undated) DEVICE — TOWEL,OR,DSP,ST,BLUE,STD,4/PK,20PK/CS: Brand: MEDLINE

## (undated) DEVICE — MANIP UTER RUMI TP 6.7MMX8CM BLU

## (undated) DEVICE — ENDOPATH PNEUMONEEDLE INSUFFLATION NEEDLES WITH LUER LOCK CONNECTORS 120MM: Brand: ENDOPATH

## (undated) DEVICE — DECANTER: Brand: UNBRANDED

## (undated) DEVICE — PK LAP GEN 90

## (undated) DEVICE — ENDOPATH 5MM ENDOSCOPIC BLUNT TIP DISSECTORS (12 POUCHES CONTAINING 3 DISSECTORS EACH): Brand: ENDOPATH

## (undated) DEVICE — CATH CHOLANG 4.5F18IN BRGNDY

## (undated) DEVICE — ADAPT CLN BIOGUARD AIR/H2O DISP

## (undated) DEVICE — SUT VIC 0 CT1 CR8 18IN J840D

## (undated) DEVICE — SOL IRR H2O BTL 1000ML STRL

## (undated) DEVICE — Device

## (undated) DEVICE — SAFELINER SUCTION CANISTER 1000CC: Brand: DEROYAL

## (undated) DEVICE — DRSNG SURESITE WNDW 4X4.5

## (undated) DEVICE — DECANT BG O JET

## (undated) DEVICE — VIAL FORMALIN CAP 10P 40ML

## (undated) DEVICE — ST TBG DSE 6FT

## (undated) DEVICE — ENDOPATH XCEL BLADELESS TROCARS WITH STABILITY SLEEVES: Brand: ENDOPATH XCEL

## (undated) DEVICE — SYR CONTRL LUERLOK 10CC

## (undated) DEVICE — THE BITE BLOCK MAXI, LATEX FREE STRAP IS USED TO PROTECT THE ENDOSCOPE INSERTION TUBE FROM BEING BITTEN BY THE PATIENT.

## (undated) DEVICE — RESERVOIR,SUCTION,100CC,SILICONE: Brand: MEDLINE

## (undated) DEVICE — TRAP FLD MINIVAC MEGADYNE 100ML

## (undated) DEVICE — NDL SPINE 22G 31/2IN BLK

## (undated) DEVICE — SYR LUERLOK 20CC BX/50

## (undated) DEVICE — GLV SURG PREMIERPRO ORTHO LTX PF SZ7.5 BRN

## (undated) DEVICE — SWAB PROCTO 16 1/2IN STRL

## (undated) DEVICE — GLV SURG SENSICARE PI MIC PF SZ7.5 LF STRL

## (undated) DEVICE — OCCL COLPO PNEUMO  STRL

## (undated) DEVICE — GOWN,PREVENTION PLUS,XLNG/XXLARGE,STRL: Brand: MEDLINE

## (undated) DEVICE — CONMED ELECTROSURGICAL ACCESSORY, BALL ELECTRODE, 5 MM, EXTENDED SHAFT: Brand: CONMED

## (undated) DEVICE — HYBRID TUBING/CAP SET FOR OLYMPUS® SCOPES: Brand: ERBE

## (undated) DEVICE — STPCK 3WY D201 DISCOFIX

## (undated) DEVICE — SAFESECURE,SECUREMENT,FOLEY CATH,STERILE: Brand: MEDLINE

## (undated) DEVICE — PENCL E/S ULTRAVAC TELESCP NOSE HOLSTR 10FT

## (undated) DEVICE — TUBING, SUCTION, 1/4" X 12', STRAIGHT: Brand: MEDLINE

## (undated) DEVICE — INTENDED FOR TISSUE SEPARATION, AND OTHER PROCEDURES THAT REQUIRE A SHARP SURGICAL BLADE TO PUNCTURE OR CUT.: Brand: BARD-PARKER ® STAINLESS STEEL BLADES

## (undated) DEVICE — PATIENT RETURN ELECTRODE, SINGLE-USE, CONTACT QUALITY MONITORING, ADULT, WITH 9FT CORD, FOR PATIENTS WEIGING OVER 33LBS. (15KG): Brand: MEGADYNE

## (undated) DEVICE — HARMONIC ACE +7 LAPAROSCOPIC SHEARS ADVANCED HEMOSTASIS 5MM DIAMETER 36CM SHAFT LENGTH  FOR USE WITH GRAY HAND PIECE ONLY: Brand: HARMONIC ACE

## (undated) DEVICE — EXOFIN PRECISION PEN HIGH VISCOSITY TOPICAL SKIN ADHESIVE: Brand: EXOFIN PRECISION PEN, 1G

## (undated) DEVICE — COUNT NDL MAG FM/BLCK 40COUNT

## (undated) DEVICE — TROCAR: Brand: KII FIOS FIRST ENTRY

## (undated) DEVICE — SOL IRR H2O BO 1000ML STRL

## (undated) DEVICE — LAPAROSCOPIC SMOKE FILTRATION SYSTEM: Brand: PALL LAPAROSHIELD® PLUS LAPAROSCOPIC SMOKE FILTRATION SYSTEM

## (undated) DEVICE — SYR LL 3CC

## (undated) DEVICE — LAPAROSCOPIC SCOPE WARMER: Brand: DEROYAL

## (undated) DEVICE — NDL HYPO PRECISIONGLIDE REG 25G 1 1/2

## (undated) DEVICE — DRP Z/FRICTION 10X16IN

## (undated) DEVICE — BLADE SHIELD SCALPEL HOLDER: Brand: DEVON

## (undated) DEVICE — TBG PENCL TELESCP MEGADYNE SMOKE EVAC 10FT

## (undated) DEVICE — GLV SURG NEOLON 2G PF LF 6.5 STRL

## (undated) DEVICE — SKIN PREP TRAY W/CHG: Brand: MEDLINE INDUSTRIES, INC.

## (undated) DEVICE — ENDOPOUCH RETRIEVER SPECIMEN RETRIEVAL BAGS: Brand: ENDOPOUCH RETRIEVER

## (undated) DEVICE — GLV SURG SENSICARE PI MIC PF SZ8 LF STRL

## (undated) DEVICE — 2, DISPOSABLE SUCTION/IRRIGATOR WITH DISPOSABLE TIP: Brand: STRYKEFLOW

## (undated) DEVICE — GLV SURG NEOPRN SENSICARE PF SZ/6.5 LF EA/1PR

## (undated) DEVICE — APPL CHLORAPREP HI/LITE 26ML ORNG

## (undated) DEVICE — EXTENSION SET, MALE LUER LOCK ADAPTER WITH RETRACTABLE COLLAR

## (undated) DEVICE — ENDOPATH XCEL UNIVERSAL TROCAR STABLILITY SLEEVES: Brand: ENDOPATH XCEL

## (undated) DEVICE — PK TLH 90

## (undated) DEVICE — SUT VIC 0 CT1 36IN J946H

## (undated) DEVICE — GLV SURG SENSICARE ORTHO PF LF 7 STRL

## (undated) DEVICE — SOL NACL 0.9PCT 1000ML

## (undated) DEVICE — DISPOSABLE MONOPOLAR ENDOSCOPIC CORD 10 FT. (3M): Brand: KIRWAN